# Patient Record
Sex: FEMALE | Race: WHITE | NOT HISPANIC OR LATINO | Employment: FULL TIME | URBAN - METROPOLITAN AREA
[De-identification: names, ages, dates, MRNs, and addresses within clinical notes are randomized per-mention and may not be internally consistent; named-entity substitution may affect disease eponyms.]

---

## 2023-08-03 ENCOUNTER — OFFICE VISIT (OUTPATIENT)
Dept: PRIMARY CARE | Facility: CLINIC | Age: 38
End: 2023-08-03
Payer: COMMERCIAL

## 2023-08-03 VITALS
BODY MASS INDEX: 23.68 KG/M2 | OXYGEN SATURATION: 97 % | HEART RATE: 117 BPM | DIASTOLIC BLOOD PRESSURE: 100 MMHG | TEMPERATURE: 97.6 F | SYSTOLIC BLOOD PRESSURE: 140 MMHG | WEIGHT: 132.6 LBS

## 2023-08-03 DIAGNOSIS — I10 PRIMARY HYPERTENSION: Primary | ICD-10-CM

## 2023-08-03 DIAGNOSIS — J45.40 MODERATE PERSISTENT ASTHMA WITHOUT COMPLICATION (HHS-HCC): ICD-10-CM

## 2023-08-03 PROCEDURE — 3080F DIAST BP >= 90 MM HG: CPT | Performed by: INTERNAL MEDICINE

## 2023-08-03 PROCEDURE — 1036F TOBACCO NON-USER: CPT | Performed by: INTERNAL MEDICINE

## 2023-08-03 PROCEDURE — 99213 OFFICE O/P EST LOW 20 MIN: CPT | Performed by: INTERNAL MEDICINE

## 2023-08-03 PROCEDURE — 3077F SYST BP >= 140 MM HG: CPT | Performed by: INTERNAL MEDICINE

## 2023-08-03 RX ORDER — AMLODIPINE BESYLATE 5 MG/1
5 TABLET ORAL DAILY
Qty: 30 TABLET | Refills: 5 | Status: SHIPPED | OUTPATIENT
Start: 2023-08-03 | End: 2024-06-07 | Stop reason: SDUPTHER

## 2023-08-03 RX ORDER — IBUPROFEN 200 MG
200 TABLET ORAL EVERY 6 HOURS
COMMUNITY
End: 2024-03-29 | Stop reason: ENTERED-IN-ERROR

## 2023-08-03 RX ORDER — GABAPENTIN 300 MG/1
300 CAPSULE ORAL 2 TIMES DAILY
COMMUNITY
End: 2023-10-19

## 2023-08-03 RX ORDER — UPADACITINIB 15 MG/1
TABLET, EXTENDED RELEASE ORAL
COMMUNITY
Start: 2022-08-10 | End: 2024-01-28

## 2023-08-03 RX ORDER — TACROLIMUS 1 MG/G
OINTMENT TOPICAL
COMMUNITY
Start: 2023-07-27 | End: 2024-03-29 | Stop reason: ENTERED-IN-ERROR

## 2023-08-03 RX ORDER — FLUTICASONE PROPIONATE AND SALMETEROL 100; 50 UG/1; UG/1
1 POWDER RESPIRATORY (INHALATION)
Qty: 60 EACH | Refills: 1 | Status: SHIPPED | OUTPATIENT
Start: 2023-08-03 | End: 2024-01-29 | Stop reason: SDUPTHER

## 2023-08-03 NOTE — PATIENT INSTRUCTIONS
Take BP medication regularly  Low salt diet, low fat diet  Check BP regularly and keep record of it.

## 2023-08-03 NOTE — PROGRESS NOTES
Subjective   Patient ID: [id number]   [facility]    Carie Mckinnon is a 37 y.o. female who presents for ER Follow-up.She went for Asthma and her BP was elevated. She use albuterol as needed for shortness of breath, she does not use advair for a long time.         Objective   Visit Vitals  BP (!) 140/100 (BP Location: Left arm, Patient Position: Sitting, BP Cuff Size: Adult)   Pulse (!) 117   Temp 36.4 °C (97.6 °F) (Skin)      Review of Systems  All 12 systems reviewed, no other abnormality except that mentioned in HPI.    Physical Exam  Constitutional- no abnormality  ENT- no abnormality  Neck- no swelling  CVS- normal S1 and S2, no murmur.  Pulmonary- clear to auscultation,no rhonchi, no wheezes.  Abdomen- normal- liver and spleen, soft, no distension, bowel sound present.  Neurological- all cranial nerves intact, speech and gait normal, no sensory or motor deficiency.  Musculoskeletal- all pulses are normal, normal movement, no joint swelling.  Skin- no rash, dry.  psychiatry- no suicidal ideation.  Lymph node- no lyphadenopathy      Assessment/Plan   Problem List Items Addressed This Visit       Primary hypertension - Primary    Relevant Medications    amLODIPine (Norvasc) 5 mg tablet    Moderate persistent asthma without complication    Relevant Medications    fluticasone propion-salmeteroL (Advair Diskus) 100-50 mcg/dose diskus inhaler   HTN- amlodipine 5 mg ordered, monitor BP and low salt diet.  Asthma- advair ordered as patient requested.    Adan aGrcia MD

## 2023-09-29 LAB
CHLAMYDIA TRACH., AMPLIFIED: NEGATIVE
N. GONORRHEA, AMPLIFIED: NEGATIVE
TRICHOMONAS VAGINALIS: NEGATIVE

## 2023-10-10 ENCOUNTER — TELEPHONE (OUTPATIENT)
Dept: OBSTETRICS AND GYNECOLOGY | Facility: CLINIC | Age: 38
End: 2023-10-10
Payer: COMMERCIAL

## 2023-10-10 NOTE — TELEPHONE ENCOUNTER
Patient was calling you back for her test results. States that she works 7-5 and is not able to answer the phone. She would like a call back tomorrow when you are back in the office and asks that you leave a detailed message on her voicemail with her consent as she will be at work.

## 2023-10-11 PROBLEM — L23.2 ALLERGIC CONTACT DERMATITIS DUE TO COSMETICS: Status: ACTIVE | Noted: 2023-02-01

## 2023-10-11 PROBLEM — N91.2 AMENORRHEA: Status: ACTIVE | Noted: 2019-11-26

## 2023-10-11 PROBLEM — R68.89 GENERAL SYMPTOM: Status: ACTIVE | Noted: 2023-10-11

## 2023-10-11 PROBLEM — L30.9 DERMATITIS, UNSPECIFIED: Status: ACTIVE | Noted: 2023-02-01

## 2023-10-11 PROBLEM — J30.9 ALLERGIC RHINITIS: Status: ACTIVE | Noted: 2023-10-11

## 2023-10-11 PROBLEM — H04.129 DRY EYE SYNDROME: Status: ACTIVE | Noted: 2023-10-11

## 2023-10-11 PROBLEM — H02.20C: Status: RESOLVED | Noted: 2023-10-11 | Resolved: 2023-10-11

## 2023-10-11 PROBLEM — R93.1 ABNORMAL ECHOCARDIOGRAM: Status: ACTIVE | Noted: 2023-10-11

## 2023-10-11 PROBLEM — F43.23 ADJUSTMENT DISORDER WITH MIXED ANXIETY AND DEPRESSED MOOD: Status: ACTIVE | Noted: 2023-10-11

## 2023-10-11 PROBLEM — H69.90 EUSTACHIAN TUBE DYSFUNCTION: Status: ACTIVE | Noted: 2023-10-11

## 2023-10-11 PROBLEM — E86.0 DEHYDRATION: Status: ACTIVE | Noted: 2018-06-21

## 2023-10-11 PROBLEM — T78.2XXA ANAPHYLAXIS: Status: ACTIVE | Noted: 2023-10-11

## 2023-10-11 PROBLEM — L20.84 INTRINSIC ALLERGIC ECZEMA: Status: ACTIVE | Noted: 2023-02-01

## 2023-10-11 PROBLEM — H02.115 CICATRICIAL ECTROPION OF LEFT LOWER EYELID: Status: ACTIVE | Noted: 2023-10-11

## 2023-10-11 PROBLEM — E87.20 LACTIC ACIDOSIS: Status: ACTIVE | Noted: 2018-06-21

## 2023-10-11 PROBLEM — F41.9 ANXIETY: Status: ACTIVE | Noted: 2023-10-11

## 2023-10-11 PROBLEM — H02.112 CICATRICIAL ECTROPION OF RIGHT LOWER EYELID: Status: ACTIVE | Noted: 2023-10-11

## 2023-10-11 PROBLEM — F10.920 ALCOHOLIC INTOXICATION WITHOUT COMPLICATION (CMS-HCC): Status: ACTIVE | Noted: 2023-01-27

## 2023-10-11 PROBLEM — G40.909 SEIZURE DISORDER (MULTI): Status: ACTIVE | Noted: 2023-10-11

## 2023-10-11 PROBLEM — H60.90 OTITIS EXTERNA: Status: ACTIVE | Noted: 2023-10-11

## 2023-10-11 PROBLEM — L23.9 ALLERGIC CONTACT DERMATITIS, UNSPECIFIED CAUSE: Status: ACTIVE | Noted: 2023-02-01

## 2023-10-11 PROBLEM — G47.00 INSOMNIA: Status: ACTIVE | Noted: 2018-02-21

## 2023-10-11 PROBLEM — R60.9 EDEMA: Status: ACTIVE | Noted: 2023-10-11

## 2023-10-11 PROBLEM — L20.9 ATOPIC DERMATITIS, UNSPECIFIED: Status: ACTIVE | Noted: 2023-02-01

## 2023-10-11 PROBLEM — R56.9 SEIZURE-LIKE ACTIVITY (MULTI): Status: ACTIVE | Noted: 2020-08-17

## 2023-10-11 PROBLEM — D75.89 MACROCYTOSIS: Status: ACTIVE | Noted: 2023-10-11

## 2023-10-11 PROBLEM — J45.909 ASTHMA (HHS-HCC): Status: ACTIVE | Noted: 2023-10-11

## 2023-10-11 RX ORDER — MELOXICAM 15 MG/1
15 TABLET ORAL DAILY
COMMUNITY
Start: 2023-03-06 | End: 2024-03-29 | Stop reason: ENTERED-IN-ERROR

## 2023-10-11 RX ORDER — ERGOCALCIFEROL 1.25 MG/1
50000 CAPSULE ORAL 3 TIMES WEEKLY
COMMUNITY
Start: 2019-12-01 | End: 2024-03-29 | Stop reason: ENTERED-IN-ERROR

## 2023-10-11 RX ORDER — CHOLECALCIFEROL (VITAMIN D3) 25 MCG
1000 TABLET ORAL
COMMUNITY
End: 2024-03-29 | Stop reason: ENTERED-IN-ERROR

## 2023-10-11 RX ORDER — ALPRAZOLAM 1 MG/1
1 TABLET ORAL NIGHTLY PRN
COMMUNITY
Start: 2020-04-15 | End: 2024-03-29 | Stop reason: ENTERED-IN-ERROR

## 2023-10-11 RX ORDER — LACOSAMIDE 100 MG/1
100 TABLET ORAL 2 TIMES DAILY
COMMUNITY
Start: 2018-01-23 | End: 2024-03-29 | Stop reason: ENTERED-IN-ERROR

## 2023-10-11 RX ORDER — ALPRAZOLAM 0.5 MG/1
1-2 TABLET ORAL AS NEEDED
COMMUNITY
Start: 2017-12-08 | End: 2024-03-29 | Stop reason: ENTERED-IN-ERROR

## 2023-10-11 RX ORDER — AZELASTINE 1 MG/ML
2 SPRAY, METERED NASAL 2 TIMES DAILY PRN
COMMUNITY
Start: 2022-05-18 | End: 2024-03-29 | Stop reason: ENTERED-IN-ERROR

## 2023-10-11 RX ORDER — MOLNUPIRAVIR 200 MG/1
CAPSULE ORAL
COMMUNITY
Start: 2022-11-29 | End: 2024-03-29 | Stop reason: ENTERED-IN-ERROR

## 2023-10-11 RX ORDER — ONDANSETRON 4 MG/1
4 TABLET, ORALLY DISINTEGRATING ORAL EVERY 4 HOURS PRN
COMMUNITY
Start: 2023-08-01 | End: 2024-03-29 | Stop reason: ENTERED-IN-ERROR

## 2023-10-11 RX ORDER — ACETAMINOPHEN AND CODEINE PHOSPHATE 300; 30 MG/1; MG/1
1 TABLET ORAL EVERY 6 HOURS PRN
COMMUNITY
Start: 2023-09-18 | End: 2024-03-29 | Stop reason: ENTERED-IN-ERROR

## 2023-10-11 RX ORDER — FLUTICASONE PROPIONATE 50 MCG
1 SPRAY, SUSPENSION (ML) NASAL
COMMUNITY
Start: 2023-02-18 | End: 2024-02-12 | Stop reason: SDUPTHER

## 2023-10-11 RX ORDER — FLUCONAZOLE 100 MG/1
100 TABLET ORAL
COMMUNITY
Start: 2022-02-25 | End: 2024-03-29 | Stop reason: ENTERED-IN-ERROR

## 2023-10-11 RX ORDER — ACETAMINOPHEN 500 MG
1000 TABLET ORAL EVERY 6 HOURS PRN
COMMUNITY
End: 2024-03-29 | Stop reason: ENTERED-IN-ERROR

## 2023-10-11 RX ORDER — HYDROXYZINE HYDROCHLORIDE 25 MG/1
25 TABLET, FILM COATED ORAL 3 TIMES DAILY PRN
COMMUNITY
Start: 2015-07-27 | End: 2024-03-29 | Stop reason: ENTERED-IN-ERROR

## 2023-10-11 RX ORDER — EPINEPHRINE 0.22MG
100 AEROSOL WITH ADAPTER (ML) INHALATION
COMMUNITY
End: 2024-03-29 | Stop reason: ENTERED-IN-ERROR

## 2023-10-11 RX ORDER — DIPHENHYDRAMINE HCL 25 MG
25 CAPSULE ORAL EVERY 6 HOURS PRN
COMMUNITY
End: 2024-03-29 | Stop reason: ENTERED-IN-ERROR

## 2023-10-11 RX ORDER — MONTELUKAST SODIUM 10 MG/1
10 TABLET ORAL DAILY
COMMUNITY
End: 2024-03-29 | Stop reason: ENTERED-IN-ERROR

## 2023-10-11 RX ORDER — ALBUTEROL SULFATE 90 UG/1
1 AEROSOL, METERED RESPIRATORY (INHALATION) EVERY 6 HOURS PRN
COMMUNITY
Start: 2020-03-27

## 2023-10-11 RX ORDER — OLOPATADINE HYDROCHLORIDE 2 MG/ML
1 SOLUTION/ DROPS OPHTHALMIC DAILY
COMMUNITY
Start: 2022-05-18 | End: 2024-03-29 | Stop reason: ENTERED-IN-ERROR

## 2023-10-11 RX ORDER — LEVONORGESTREL 52 MG/1
INTRAUTERINE DEVICE INTRAUTERINE
COMMUNITY
Start: 2023-09-28 | End: 2024-03-29 | Stop reason: ENTERED-IN-ERROR

## 2023-10-11 RX ORDER — ALBUTEROL SULFATE 0.83 MG/ML
SOLUTION RESPIRATORY (INHALATION)
COMMUNITY
Start: 2022-05-05 | End: 2024-03-29 | Stop reason: ENTERED-IN-ERROR

## 2023-10-11 RX ORDER — IRON POLYSACCHARIDE COMPLEX 150 MG
150 CAPSULE ORAL
COMMUNITY
Start: 2019-12-01 | End: 2024-03-29 | Stop reason: ENTERED-IN-ERROR

## 2023-10-13 ENCOUNTER — APPOINTMENT (OUTPATIENT)
Dept: DERMATOLOGY | Facility: CLINIC | Age: 38
End: 2023-10-13
Payer: COMMERCIAL

## 2023-10-16 DIAGNOSIS — M21.172 VARUS DEFORMITY, NOT ELSEWHERE CLASSIFIED, LEFT ANKLE: ICD-10-CM

## 2023-10-19 RX ORDER — GABAPENTIN 300 MG/1
300 CAPSULE ORAL 2 TIMES DAILY
Qty: 60 CAPSULE | Refills: 6 | Status: SHIPPED | OUTPATIENT
Start: 2023-10-19 | End: 2024-06-07 | Stop reason: SDUPTHER

## 2023-11-03 PROBLEM — Z30.431 CONTRACEPTIVE, SURVEILLANCE, INTRAUTERINE DEVICE: Status: ACTIVE | Noted: 2023-11-03

## 2023-11-03 NOTE — PROGRESS NOTES
SUBJECTIVE  Carie Mckinnon is a 37 y.o. female here for 4 week IUD string check    No obstetric history on file.    Gyn:   Menstrual Pattern:   STIs: denies  Sexual Activity: men, monogamous, no complaints  Contraception: IUD    [unfilled]  Past Medical History:   Diagnosis Date    Personal history of other diseases of the circulatory system     History of SBE (subacute bacterial endocarditis)    Unspecified cataract     Cataracts, both eyes      Past Surgical History:   Procedure Laterality Date    OTHER SURGICAL HISTORY  07/21/2022    No history of surgery        OBJECTIVE  There were no vitals taken for this visit.     General:   Alert and oriented, in no acute distress   Neck: Supple. No visible thyromegaly.    Abdomen: Soft, non-tender, without masses or organomegaly   Vulva: Normal architecture without erythema, masses, or lesions.    Vagina: Normal mucosa without lesions, masses, or atrophy. No abnormal vaginal discharge.    Cervix: Normal without masses, lesions, or signs of cervicitis. IUD string visible   Pelvic Floor No POP noted. No high tone pelvic floor    Psych Normal affect. Normal mood.      Problem List Items Addressed This Visit       Contraceptive, surveillance, intrauterine device - Primary      IMPRESSIONS:  IUD in place , string visible        Erinn Vargas MD.TBGYN

## 2023-11-06 ENCOUNTER — OFFICE VISIT (OUTPATIENT)
Dept: OBSTETRICS AND GYNECOLOGY | Facility: CLINIC | Age: 38
End: 2023-11-06
Payer: COMMERCIAL

## 2023-11-06 VITALS — DIASTOLIC BLOOD PRESSURE: 112 MMHG | BODY MASS INDEX: 25.53 KG/M2 | SYSTOLIC BLOOD PRESSURE: 160 MMHG | WEIGHT: 143 LBS

## 2023-11-06 DIAGNOSIS — Z30.431 CONTRACEPTIVE, SURVEILLANCE, INTRAUTERINE DEVICE: Primary | ICD-10-CM

## 2023-11-06 PROCEDURE — 3077F SYST BP >= 140 MM HG: CPT | Performed by: OBSTETRICS & GYNECOLOGY

## 2023-11-06 PROCEDURE — 99213 OFFICE O/P EST LOW 20 MIN: CPT | Performed by: OBSTETRICS & GYNECOLOGY

## 2023-11-06 PROCEDURE — 3080F DIAST BP >= 90 MM HG: CPT | Performed by: OBSTETRICS & GYNECOLOGY

## 2023-11-06 PROCEDURE — 1036F TOBACCO NON-USER: CPT | Performed by: OBSTETRICS & GYNECOLOGY

## 2024-01-25 DIAGNOSIS — L20.89 OTHER ATOPIC DERMATITIS: Primary | ICD-10-CM

## 2024-01-28 RX ORDER — UPADACITINIB 15 MG/1
15 TABLET, EXTENDED RELEASE ORAL DAILY
Qty: 30 TABLET | Refills: 11 | Status: SHIPPED | OUTPATIENT
Start: 2024-01-28

## 2024-01-29 ENCOUNTER — TELEPHONE (OUTPATIENT)
Dept: PRIMARY CARE | Facility: CLINIC | Age: 39
End: 2024-01-29
Payer: COMMERCIAL

## 2024-01-29 DIAGNOSIS — J45.40 MODERATE PERSISTENT ASTHMA WITHOUT COMPLICATION (HHS-HCC): ICD-10-CM

## 2024-01-29 DIAGNOSIS — J30.89 ALLERGIC RHINITIS DUE TO OTHER ALLERGIC TRIGGER, UNSPECIFIED SEASONALITY: ICD-10-CM

## 2024-01-29 NOTE — TELEPHONE ENCOUNTER
Patient called, LVM, requesting refill of Advair 100/40 be sent to [facility] in [place].  Said that this was originally filled by Dr. Garcia, who she saw in Aug due to no availability to see you.      She recently had foot surgery, is hard to get in and out of the house and would be difficult to come in for appt, but if that is what's needed, she will make it happen.     LOV 8/3/23

## 2024-02-12 ENCOUNTER — TELEPHONE (OUTPATIENT)
Dept: PRIMARY CARE | Facility: CLINIC | Age: 39
End: 2024-02-12
Payer: COMMERCIAL

## 2024-02-13 NOTE — TELEPHONE ENCOUNTER
Patient called, left msg, regarding request for refill of Advair that she called about on 1/29/24.  She said she recently had foot surgery and would be difficult to come in for appt right now.      Please approve refill.

## 2024-02-14 RX ORDER — FLUTICASONE PROPIONATE AND SALMETEROL 100; 50 UG/1; UG/1
1 POWDER RESPIRATORY (INHALATION)
Qty: 60 EACH | Refills: 1 | Status: SHIPPED | OUTPATIENT
Start: 2024-02-14 | End: 2025-02-13

## 2024-02-14 RX ORDER — FLUTICASONE PROPIONATE 50 MCG
1 SPRAY, SUSPENSION (ML) NASAL
Qty: 16 G | Refills: 0 | Status: SHIPPED | OUTPATIENT
Start: 2024-02-14

## 2024-03-26 ENCOUNTER — TELEMEDICINE (OUTPATIENT)
Dept: DERMATOLOGY | Facility: CLINIC | Age: 39
End: 2024-03-26
Payer: COMMERCIAL

## 2024-03-26 DIAGNOSIS — L20.9 ATOPIC DERMATITIS, UNSPECIFIED TYPE: Primary | ICD-10-CM

## 2024-03-26 PROCEDURE — 99214 OFFICE O/P EST MOD 30 MIN: CPT | Performed by: DERMATOLOGY

## 2024-03-26 PROCEDURE — 1036F TOBACCO NON-USER: CPT | Performed by: DERMATOLOGY

## 2024-03-26 ASSESSMENT — DERMATOLOGY QUALITY OF LIFE (QOL) ASSESSMENT
RATE HOW EMOTIONALLY BOTHERED YOU ARE BY YOUR SKIN PROBLEM (FOR EXAMPLE, WORRY, EMBARRASSMENT, FRUSTRATION): 4
RATE HOW BOTHERED YOU ARE BY EFFECTS OF YOUR SKIN PROBLEMS ON YOUR ACTIVITIES (EG, GOING OUT, ACCOMPLISHING WHAT YOU WANT, WORK ACTIVITIES OR YOUR RELATIONSHIPS WITH OTHERS): 0 - NEVER BOTHERED
RATE HOW EMOTIONALLY BOTHERED YOU ARE BY YOUR SKIN PROBLEM (FOR EXAMPLE, WORRY, EMBARRASSMENT, FRUSTRATION): 4
RATE HOW BOTHERED YOU ARE BY SYMPTOMS OF YOUR SKIN PROBLEM (EG, ITCHING, STINGING BURNING, HURTING OR SKIN IRRITATION): 2
WHAT SINGLE SKIN CONDITION LISTED BELOW IS THE PATIENT ANSWERING THE QUALITY-OF-LIFE ASSESSMENT QUESTIONS ABOUT: DERMATITIS
RATE HOW BOTHERED YOU ARE BY EFFECTS OF YOUR SKIN PROBLEMS ON YOUR ACTIVITIES (EG, GOING OUT, ACCOMPLISHING WHAT YOU WANT, WORK ACTIVITIES OR YOUR RELATIONSHIPS WITH OTHERS): 0 - NEVER BOTHERED
WHAT SINGLE SKIN CONDITION LISTED BELOW IS THE PATIENT ANSWERING THE QUALITY-OF-LIFE ASSESSMENT QUESTIONS ABOUT: DERMATITIS
RATE HOW BOTHERED YOU ARE BY SYMPTOMS OF YOUR SKIN PROBLEM (EG, ITCHING, STINGING BURNING, HURTING OR SKIN IRRITATION): 2

## 2024-03-26 ASSESSMENT — PATIENT GLOBAL ASSESSMENT (PGA): WHAT IS THE PGA: PATIENT GLOBAL ASSESSMENT:  2 - MILD

## 2024-03-26 NOTE — PROGRESS NOTES
Subjective     Sherin Mckinnon is a 38 y.o. female who presents for the follow-up for atopic dermatitis.     Patient is being seen for atopic dermatitis and previously was seen by Dr. Kat. Patient had trialed Dupixent but did not have adequate control and was transitioned to Rinvoq in [id number]. She had a great response and continues to use tacrolimus 0.1% ointment as needed. Due to changes in insurance, she was not able to continue treatment with Rinvoq due to cost as of January 2024.    Patient notes her eczematous plaques on primarily her forehead. Bilateral antecubital fossa and neck but it is mild. She notes she continues to use triamcinolone ointment as needed but has had very good success on Rinvoq when she was taking it.     Denies any other issues.     Review of Systems:  No other skin or systemic complaints other than what is documented elsewhere in the note.    The following portions of the chart were reviewed this encounter and updated as appropriate:         Skin Cancer History  No skin cancer on file.      Specialty Problems          Dermatology Problems    Erythroderma     Last Assessment & Plan: Formatting of this note might be different from the original. atarax         Eczema     Formatting of this note might be different from the original. was diagnosed with eczema at the age of 4 and was treated with steroids since then. Recent hx goes back to January 2015 when pt got admitted to [facility] for worsening generalized eczema along with skin sloughing and concern for SJS. Diagnosis was worsening eczema and pt was treated with pulse steroids and dsicharged on topical steroids. She then saw Rheumatology at an OSH who started her back on po steroids. Discussion was held on to whether pt needs to be started on immunosuppressants but was never done give increased diastolic pressure?? Of note, pt also tried phototherapy without improvement. She has been off steroids since August  Plan: - Consult Dermatology - Pt refusing topical steroids - Resume home hydroxizine and gabapentin for pain/itching         Allergic contact dermatitis, unspecified cause    Atopic dermatitis, unspecified    Dermatitis, unspecified    Intrinsic allergic eczema        Objective   Well appearing patient in no apparent distress; mood and affect are within normal limits.    A full examination was performed including scalp, head, eyes, ears, nose, lips, neck, chest, axillae, abdomen, back, buttocks, bilateral upper extremities, bilateral lower extremities, hands, feet, fingers, toes, fingernails, and toenails. All findings within normal limits unless otherwise noted below.    Assessment/Plan   1. Atopic dermatitis, unspecified type  Face, antecubital fossa, neck  Very mild erythematous scaly papules and plaques with overyling excoriation located symmetrically     Atopic dermatitis  - Discussed disease etiology with regards to skin barrier breakdown  - Previously on topical steroids and Dupixent with sub optimal control  - Had excellent response to Rinvoq when started in [id number]. Due to cost/insurance, has not been able to obtain Rinvoq since January 2024  - Continues to use tacrolimus 0.1% ointment as needed. Adamant she does not wish to use any topical steroids.   - CONTINUE with Rinvoq 15 mg ER tablet once daily. Will work to get approval through insurance. Discussed alternatives to Rinvoq such as topical DAO inhibitors (abrocitinib, ruxolitinib)  - CONTINUE with tacrolimus 0.1% ointment as needed        Buddy Egan MD  PGY3 Dermatology

## 2024-03-29 ENCOUNTER — APPOINTMENT (OUTPATIENT)
Dept: RADIOLOGY | Facility: HOSPITAL | Age: 39
End: 2024-03-29
Payer: COMMERCIAL

## 2024-03-29 ENCOUNTER — HOSPITAL ENCOUNTER (OUTPATIENT)
Facility: HOSPITAL | Age: 39
Setting detail: OBSERVATION
Discharge: ANOTHER HOSPITAL | End: 2024-03-30
Attending: STUDENT IN AN ORGANIZED HEALTH CARE EDUCATION/TRAINING PROGRAM | Admitting: INTERNAL MEDICINE
Payer: COMMERCIAL

## 2024-03-29 ENCOUNTER — APPOINTMENT (OUTPATIENT)
Dept: CARDIOLOGY | Facility: HOSPITAL | Age: 39
End: 2024-03-29
Payer: COMMERCIAL

## 2024-03-29 ENCOUNTER — APPOINTMENT (OUTPATIENT)
Dept: DERMATOLOGY | Facility: CLINIC | Age: 39
End: 2024-03-29
Payer: COMMERCIAL

## 2024-03-29 DIAGNOSIS — R41.82 ALTERED MENTAL STATUS, UNSPECIFIED ALTERED MENTAL STATUS TYPE: Primary | ICD-10-CM

## 2024-03-29 DIAGNOSIS — G40.909 SEIZURE DISORDER (MULTI): ICD-10-CM

## 2024-03-29 PROBLEM — G45.9 TIA (TRANSIENT ISCHEMIC ATTACK): Status: ACTIVE | Noted: 2024-03-29

## 2024-03-29 LAB
ALBUMIN SERPL BCP-MCNC: 4.7 G/DL (ref 3.4–5)
ALP SERPL-CCNC: 70 U/L (ref 33–110)
ALT SERPL W P-5'-P-CCNC: 17 U/L (ref 7–45)
AMPHETAMINES UR QL SCN: ABNORMAL
ANION GAP BLDV CALCULATED.4IONS-SCNC: 16 MMOL/L (ref 10–25)
ANION GAP SERPL CALC-SCNC: 19 MMOL/L (ref 10–20)
AST SERPL W P-5'-P-CCNC: 76 U/L (ref 9–39)
B-HCG SERPL-ACNC: <2 MIU/ML
BARBITURATES UR QL SCN: ABNORMAL
BASE EXCESS BLDV CALC-SCNC: 1.4 MMOL/L (ref -2–3)
BASOPHILS # BLD AUTO: 0.01 X10*3/UL (ref 0–0.1)
BASOPHILS NFR BLD AUTO: 0.1 %
BENZODIAZ UR QL SCN: ABNORMAL
BILIRUB DIRECT SERPL-MCNC: 0 MG/DL (ref 0–0.3)
BILIRUB SERPL-MCNC: 0.3 MG/DL (ref 0–1.2)
BNP SERPL-MCNC: 168 PG/ML (ref 0–99)
BODY TEMPERATURE: 37 DEGREES CELSIUS
BUN SERPL-MCNC: 27 MG/DL (ref 6–23)
BZE UR QL SCN: ABNORMAL
CA-I BLDV-SCNC: 1.12 MMOL/L (ref 1.1–1.33)
CALCIUM SERPL-MCNC: 10 MG/DL (ref 8.6–10.3)
CANNABINOIDS UR QL SCN: ABNORMAL
CARDIAC TROPONIN I PNL SERPL HS: 9 NG/L (ref 0–13)
CHLORIDE BLDV-SCNC: 98 MMOL/L (ref 98–107)
CHLORIDE SERPL-SCNC: 95 MMOL/L (ref 98–107)
CO2 SERPL-SCNC: 24 MMOL/L (ref 21–32)
CREAT SERPL-MCNC: 0.62 MG/DL (ref 0.5–1.05)
EGFRCR SERPLBLD CKD-EPI 2021: >90 ML/MIN/1.73M*2
EOSINOPHIL # BLD AUTO: 0 X10*3/UL (ref 0–0.7)
EOSINOPHIL NFR BLD AUTO: 0 %
ERYTHROCYTE [DISTWIDTH] IN BLOOD BY AUTOMATED COUNT: 13.1 % (ref 11.5–14.5)
FENTANYL+NORFENTANYL UR QL SCN: ABNORMAL
FLUAV RNA RESP QL NAA+PROBE: NOT DETECTED
FLUBV RNA RESP QL NAA+PROBE: NOT DETECTED
GLUCOSE BLDV-MCNC: 108 MG/DL (ref 74–99)
GLUCOSE SERPL-MCNC: 111 MG/DL (ref 74–99)
HCO3 BLDV-SCNC: 24.7 MMOL/L (ref 22–26)
HCT VFR BLD AUTO: 39.4 % (ref 36–46)
HCT VFR BLD EST: 39 % (ref 36–46)
HGB BLD-MCNC: 13.1 G/DL (ref 12–16)
HGB BLDV-MCNC: 13 G/DL (ref 12–16)
IMM GRANULOCYTES # BLD AUTO: 0.05 X10*3/UL (ref 0–0.7)
IMM GRANULOCYTES NFR BLD AUTO: 0.4 % (ref 0–0.9)
INHALED O2 CONCENTRATION: 21 %
LACTATE BLDV-SCNC: 1.3 MMOL/L (ref 0.4–2)
LACTATE SERPL-SCNC: 0.7 MMOL/L (ref 0.4–2)
LIPASE SERPL-CCNC: 11 U/L (ref 9–82)
LYMPHOCYTES # BLD AUTO: 0.72 X10*3/UL (ref 1.2–4.8)
LYMPHOCYTES NFR BLD AUTO: 6.5 %
MAGNESIUM SERPL-MCNC: 2.1 MG/DL (ref 1.6–2.4)
MCH RBC QN AUTO: 32.3 PG (ref 26–34)
MCHC RBC AUTO-ENTMCNC: 33.2 G/DL (ref 32–36)
MCV RBC AUTO: 97 FL (ref 80–100)
METHADONE UR QL SCN: ABNORMAL
MONOCYTES # BLD AUTO: 0.98 X10*3/UL (ref 0.1–1)
MONOCYTES NFR BLD AUTO: 8.8 %
NEUTROPHILS # BLD AUTO: 9.37 X10*3/UL (ref 1.2–7.7)
NEUTROPHILS NFR BLD AUTO: 84.2 %
NRBC BLD-RTO: 0 /100 WBCS (ref 0–0)
OPIATES UR QL SCN: ABNORMAL
OXYCODONE+OXYMORPHONE UR QL SCN: ABNORMAL
OXYHGB MFR BLDV: 90.9 % (ref 45–75)
PCO2 BLDV: 34 MM HG (ref 41–51)
PCP UR QL SCN: ABNORMAL
PH BLDV: 7.47 PH (ref 7.33–7.43)
PHOSPHATE SERPL-MCNC: 2.2 MG/DL (ref 2.5–4.9)
PLATELET # BLD AUTO: 513 X10*3/UL (ref 150–450)
PO2 BLDV: 65 MM HG (ref 35–45)
POTASSIUM BLDV-SCNC: 3.2 MMOL/L (ref 3.5–5.3)
POTASSIUM SERPL-SCNC: 3.1 MMOL/L (ref 3.5–5.3)
PROT SERPL-MCNC: 8.4 G/DL (ref 6.4–8.2)
RBC # BLD AUTO: 4.05 X10*6/UL (ref 4–5.2)
RSV RNA RESP QL NAA+PROBE: NOT DETECTED
SAO2 % BLDV: 93 % (ref 45–75)
SARS-COV-2 RNA RESP QL NAA+PROBE: NOT DETECTED
SODIUM BLDV-SCNC: 135 MMOL/L (ref 136–145)
SODIUM SERPL-SCNC: 135 MMOL/L (ref 136–145)
WBC # BLD AUTO: 11.1 X10*3/UL (ref 4.4–11.3)

## 2024-03-29 PROCEDURE — 74177 CT ABD & PELVIS W/CONTRAST: CPT

## 2024-03-29 PROCEDURE — 71045 X-RAY EXAM CHEST 1 VIEW: CPT | Performed by: RADIOLOGY

## 2024-03-29 PROCEDURE — 83605 ASSAY OF LACTIC ACID: CPT | Performed by: STUDENT IN AN ORGANIZED HEALTH CARE EDUCATION/TRAINING PROGRAM

## 2024-03-29 PROCEDURE — 84132 ASSAY OF SERUM POTASSIUM: CPT | Performed by: STUDENT IN AN ORGANIZED HEALTH CARE EDUCATION/TRAINING PROGRAM

## 2024-03-29 PROCEDURE — 74177 CT ABD & PELVIS W/CONTRAST: CPT | Performed by: RADIOLOGY

## 2024-03-29 PROCEDURE — 2550000001 HC RX 255 CONTRASTS: Performed by: STUDENT IN AN ORGANIZED HEALTH CARE EDUCATION/TRAINING PROGRAM

## 2024-03-29 PROCEDURE — 84484 ASSAY OF TROPONIN QUANT: CPT | Performed by: STUDENT IN AN ORGANIZED HEALTH CARE EDUCATION/TRAINING PROGRAM

## 2024-03-29 PROCEDURE — 84075 ASSAY ALKALINE PHOSPHATASE: CPT | Performed by: STUDENT IN AN ORGANIZED HEALTH CARE EDUCATION/TRAINING PROGRAM

## 2024-03-29 PROCEDURE — 70450 CT HEAD/BRAIN W/O DYE: CPT

## 2024-03-29 PROCEDURE — 93005 ELECTROCARDIOGRAM TRACING: CPT

## 2024-03-29 PROCEDURE — G0378 HOSPITAL OBSERVATION PER HR: HCPCS

## 2024-03-29 PROCEDURE — 71045 X-RAY EXAM CHEST 1 VIEW: CPT

## 2024-03-29 PROCEDURE — 84702 CHORIONIC GONADOTROPIN TEST: CPT | Performed by: STUDENT IN AN ORGANIZED HEALTH CARE EDUCATION/TRAINING PROGRAM

## 2024-03-29 PROCEDURE — 99285 EMERGENCY DEPT VISIT HI MDM: CPT | Mod: 25

## 2024-03-29 PROCEDURE — 85025 COMPLETE CBC W/AUTO DIFF WBC: CPT | Performed by: STUDENT IN AN ORGANIZED HEALTH CARE EDUCATION/TRAINING PROGRAM

## 2024-03-29 PROCEDURE — 96361 HYDRATE IV INFUSION ADD-ON: CPT

## 2024-03-29 PROCEDURE — 80307 DRUG TEST PRSMV CHEM ANLYZR: CPT | Performed by: STUDENT IN AN ORGANIZED HEALTH CARE EDUCATION/TRAINING PROGRAM

## 2024-03-29 PROCEDURE — 87637 SARSCOV2&INF A&B&RSV AMP PRB: CPT | Performed by: STUDENT IN AN ORGANIZED HEALTH CARE EDUCATION/TRAINING PROGRAM

## 2024-03-29 PROCEDURE — 83735 ASSAY OF MAGNESIUM: CPT | Performed by: STUDENT IN AN ORGANIZED HEALTH CARE EDUCATION/TRAINING PROGRAM

## 2024-03-29 PROCEDURE — 2500000001 HC RX 250 WO HCPCS SELF ADMINISTERED DRUGS (ALT 637 FOR MEDICARE OP): Performed by: PHYSICIAN ASSISTANT

## 2024-03-29 PROCEDURE — 81001 URINALYSIS AUTO W/SCOPE: CPT | Performed by: STUDENT IN AN ORGANIZED HEALTH CARE EDUCATION/TRAINING PROGRAM

## 2024-03-29 PROCEDURE — 83880 ASSAY OF NATRIURETIC PEPTIDE: CPT | Performed by: STUDENT IN AN ORGANIZED HEALTH CARE EDUCATION/TRAINING PROGRAM

## 2024-03-29 PROCEDURE — 2500000004 HC RX 250 GENERAL PHARMACY W/ HCPCS (ALT 636 FOR OP/ED): Performed by: STUDENT IN AN ORGANIZED HEALTH CARE EDUCATION/TRAINING PROGRAM

## 2024-03-29 PROCEDURE — 84100 ASSAY OF PHOSPHORUS: CPT | Performed by: STUDENT IN AN ORGANIZED HEALTH CARE EDUCATION/TRAINING PROGRAM

## 2024-03-29 PROCEDURE — 36415 COLL VENOUS BLD VENIPUNCTURE: CPT | Performed by: STUDENT IN AN ORGANIZED HEALTH CARE EDUCATION/TRAINING PROGRAM

## 2024-03-29 PROCEDURE — 99222 1ST HOSP IP/OBS MODERATE 55: CPT | Performed by: PHYSICIAN ASSISTANT

## 2024-03-29 PROCEDURE — 70450 CT HEAD/BRAIN W/O DYE: CPT | Performed by: RADIOLOGY

## 2024-03-29 PROCEDURE — 83690 ASSAY OF LIPASE: CPT | Performed by: STUDENT IN AN ORGANIZED HEALTH CARE EDUCATION/TRAINING PROGRAM

## 2024-03-29 RX ORDER — INSULIN LISPRO 100 [IU]/ML
0-5 INJECTION, SOLUTION INTRAVENOUS; SUBCUTANEOUS
Status: DISCONTINUED | OUTPATIENT
Start: 2024-03-30 | End: 2024-03-30 | Stop reason: HOSPADM

## 2024-03-29 RX ORDER — DEXTROSE 50 % IN WATER (D50W) INTRAVENOUS SYRINGE
12.5
Status: DISCONTINUED | OUTPATIENT
Start: 2024-03-29 | End: 2024-03-30 | Stop reason: HOSPADM

## 2024-03-29 RX ORDER — ALBUTEROL SULFATE 0.83 MG/ML
2.5 SOLUTION RESPIRATORY (INHALATION) EVERY 6 HOURS PRN
Status: DISCONTINUED | OUTPATIENT
Start: 2024-03-29 | End: 2024-03-29

## 2024-03-29 RX ORDER — FLUTICASONE PROPIONATE 50 MCG
1 SPRAY, SUSPENSION (ML) NASAL DAILY PRN
Status: DISCONTINUED | OUTPATIENT
Start: 2024-03-29 | End: 2024-03-30 | Stop reason: HOSPADM

## 2024-03-29 RX ORDER — BUDESONIDE 0.5 MG/2ML
0.5 INHALANT ORAL
Status: DISCONTINUED | OUTPATIENT
Start: 2024-03-29 | End: 2024-03-30 | Stop reason: HOSPADM

## 2024-03-29 RX ORDER — GABAPENTIN 300 MG/1
300 CAPSULE ORAL 2 TIMES DAILY
Status: DISCONTINUED | OUTPATIENT
Start: 2024-03-29 | End: 2024-03-30 | Stop reason: HOSPADM

## 2024-03-29 RX ORDER — FORMOTEROL FUMARATE DIHYDRATE 20 UG/2ML
20 SOLUTION RESPIRATORY (INHALATION)
Status: DISCONTINUED | OUTPATIENT
Start: 2024-03-29 | End: 2024-03-30 | Stop reason: HOSPADM

## 2024-03-29 RX ORDER — FLUTICASONE FUROATE AND VILANTEROL 100; 25 UG/1; UG/1
1 POWDER RESPIRATORY (INHALATION)
Status: DISCONTINUED | OUTPATIENT
Start: 2024-03-30 | End: 2024-03-29

## 2024-03-29 RX ORDER — ALBUTEROL SULFATE 0.83 MG/ML
2.5 SOLUTION RESPIRATORY (INHALATION) EVERY 2 HOUR PRN
Status: DISCONTINUED | OUTPATIENT
Start: 2024-03-29 | End: 2024-03-30 | Stop reason: HOSPADM

## 2024-03-29 RX ORDER — ASPIRIN 81 MG/1
81 TABLET ORAL DAILY
Status: DISCONTINUED | OUTPATIENT
Start: 2024-03-29 | End: 2024-03-30 | Stop reason: HOSPADM

## 2024-03-29 RX ORDER — PROCHLORPERAZINE EDISYLATE 5 MG/ML
5 INJECTION INTRAMUSCULAR; INTRAVENOUS EVERY 6 HOURS PRN
Status: DISCONTINUED | OUTPATIENT
Start: 2024-03-29 | End: 2024-03-30 | Stop reason: HOSPADM

## 2024-03-29 RX ORDER — ATORVASTATIN CALCIUM 40 MG/1
40 TABLET, FILM COATED ORAL NIGHTLY
Status: DISCONTINUED | OUTPATIENT
Start: 2024-03-29 | End: 2024-03-30 | Stop reason: HOSPADM

## 2024-03-29 RX ORDER — DEXTROSE 50 % IN WATER (D50W) INTRAVENOUS SYRINGE
25
Status: DISCONTINUED | OUTPATIENT
Start: 2024-03-29 | End: 2024-03-30 | Stop reason: HOSPADM

## 2024-03-29 RX ADMIN — SODIUM CHLORIDE, SODIUM LACTATE, POTASSIUM CHLORIDE, AND CALCIUM CHLORIDE 1000 ML: 600; 310; 30; 20 INJECTION, SOLUTION INTRAVENOUS at 15:15

## 2024-03-29 RX ADMIN — IOHEXOL 75 ML: 350 INJECTION, SOLUTION INTRAVENOUS at 18:47

## 2024-03-29 RX ADMIN — GABAPENTIN 300 MG: 300 CAPSULE ORAL at 21:54

## 2024-03-29 SDOH — SOCIAL STABILITY: SOCIAL INSECURITY: ARE YOU OR HAVE YOU BEEN THREATENED OR ABUSED PHYSICALLY, EMOTIONALLY, OR SEXUALLY BY ANYONE?: NO

## 2024-03-29 SDOH — SOCIAL STABILITY: SOCIAL INSECURITY: HAS ANYONE EVER THREATENED TO HURT YOUR FAMILY OR YOUR PETS?: NO

## 2024-03-29 SDOH — SOCIAL STABILITY: SOCIAL INSECURITY: HAVE YOU HAD THOUGHTS OF HARMING ANYONE ELSE?: NO

## 2024-03-29 SDOH — SOCIAL STABILITY: SOCIAL INSECURITY: ABUSE: ADULT

## 2024-03-29 SDOH — SOCIAL STABILITY: SOCIAL INSECURITY: DOES ANYONE TRY TO KEEP YOU FROM HAVING/CONTACTING OTHER FRIENDS OR DOING THINGS OUTSIDE YOUR HOME?: NO

## 2024-03-29 SDOH — SOCIAL STABILITY: SOCIAL INSECURITY: ARE THERE ANY APPARENT SIGNS OF INJURIES/BEHAVIORS THAT COULD BE RELATED TO ABUSE/NEGLECT?: NO

## 2024-03-29 SDOH — SOCIAL STABILITY: SOCIAL INSECURITY: DO YOU FEEL ANYONE HAS EXPLOITED OR TAKEN ADVANTAGE OF YOU FINANCIALLY OR OF YOUR PERSONAL PROPERTY?: NO

## 2024-03-29 SDOH — SOCIAL STABILITY: SOCIAL INSECURITY: DO YOU FEEL UNSAFE GOING BACK TO THE PLACE WHERE YOU ARE LIVING?: NO

## 2024-03-29 ASSESSMENT — COGNITIVE AND FUNCTIONAL STATUS - GENERAL
DAILY ACTIVITIY SCORE: 24
PATIENT BASELINE BEDBOUND: NO
MOBILITY SCORE: 24

## 2024-03-29 ASSESSMENT — COLUMBIA-SUICIDE SEVERITY RATING SCALE - C-SSRS
1. IN THE PAST MONTH, HAVE YOU WISHED YOU WERE DEAD OR WISHED YOU COULD GO TO SLEEP AND NOT WAKE UP?: NO
6. HAVE YOU EVER DONE ANYTHING, STARTED TO DO ANYTHING, OR PREPARED TO DO ANYTHING TO END YOUR LIFE?: NO
2. HAVE YOU ACTUALLY HAD ANY THOUGHTS OF KILLING YOURSELF?: NO

## 2024-03-29 ASSESSMENT — PATIENT HEALTH QUESTIONNAIRE - PHQ9
SUM OF ALL RESPONSES TO PHQ9 QUESTIONS 1 & 2: 0
2. FEELING DOWN, DEPRESSED OR HOPELESS: NOT AT ALL
1. LITTLE INTEREST OR PLEASURE IN DOING THINGS: NOT AT ALL

## 2024-03-29 ASSESSMENT — LIFESTYLE VARIABLES
SKIP TO QUESTIONS 9-10: 1
AUDIT-C TOTAL SCORE: 1
HOW OFTEN DO YOU HAVE A DRINK CONTAINING ALCOHOL: MONTHLY OR LESS
AUDIT-C TOTAL SCORE: 1
HOW MANY STANDARD DRINKS CONTAINING ALCOHOL DO YOU HAVE ON A TYPICAL DAY: PATIENT DOES NOT DRINK
HOW OFTEN DO YOU HAVE 6 OR MORE DRINKS ON ONE OCCASION: NEVER

## 2024-03-29 ASSESSMENT — ACTIVITIES OF DAILY LIVING (ADL)
GROOMING: INDEPENDENT
PATIENT'S MEMORY ADEQUATE TO SAFELY COMPLETE DAILY ACTIVITIES?: YES
JUDGMENT_ADEQUATE_SAFELY_COMPLETE_DAILY_ACTIVITIES: YES
HEARING - RIGHT EAR: FUNCTIONAL
DRESSING YOURSELF: INDEPENDENT
FEEDING YOURSELF: INDEPENDENT
TOILETING: INDEPENDENT
WALKS IN HOME: INDEPENDENT
BATHING: INDEPENDENT
ADEQUATE_TO_COMPLETE_ADL: YES
HEARING - LEFT EAR: FUNCTIONAL

## 2024-03-29 NOTE — ED PROVIDER NOTES
HPI:    History provided by: Patient and family     38-year-old female past medical history of celiac disease, seizure-like activity, PTSD, anxiety, asthma, history of bacterial endocarditis presented to the emergency department for altered mental status.  Patient states that she has been feeling unwell the last few days.  She did not go to work yesterday because she did not feel well.  Today, she had 5 episodes of nonbloody, nonbilious emesis along with discomfort in her abdomen.  She had a questionable seizure-like activity at home today noticed by family and was confused afterwards, so was brought into the emergency department by EMS.  Patient denies any pain anywhere at this time, just taking that she feels dizzy.  No headache, no weakness or numbness in her arms or legs.  No coughing, fevers, chills.  Denies any diarrhea, burning with urination, or blood in her urine.Family states that something like this has happened in the past where she had difficulty saying words or remembering things after a seizure in [place], but this resolved before she went to the hospital.      ROS: All pertinent positives and negatives reviewed in HPI    PMHx: Reviewed  PSHx: Reviewed  Social: no Etoh, no tobacco, no social drugs  Social Determinants of Health impacting care: NA  Allergies: Reviewed in EMR  FMHx: Noncontributory to patient's chief complaint  Medications: Reviewed        Vital signs and triage note reviewed per nursing documentation    Physical Exam:     GEN: Sitting in no acute distress. Well-appearing, appears stated age  HEAD: Atraumatic, normocephalic  EYES: EOMI. Pupils equal and reactive.   HEENT: MMM. No oropharyngeal erythema, exudates, or uvular deviation.   Neck: Supple, full ROM  CVS: Regular rate and rhythm, no murmurs, gallops, or rubs  PULM: Clear to auscultation bilaterally. No wheezes, rales, rhonchi.   ABD: Soft, nontender to palpation. No rigidity, guarding, or tympany  EXT: +2 radial and DP  pulses bilaterally. No pitting edema noted. No varicose veins  NEURO: Awake with eyes open, following providers and interactive and having conversations.  No drift in upper or lower extremities.  Does take a second to answer questions.  Answers questions appropriately for me.  Knows the year, her name, the month.    Emergency Department Course    Medications Ordered: IV LR    EKG: Regular ventricular rate, regular rhythm, normal axis. MA, QRS, and QTC intervals within normal limits. No acute ST segment changes or T wave inversions.     Impression: Normal sinus rhythm with no acute ischemia or arrhythmia, no previous EKG seen for comparison.       MDM    38-year-old female history of celiac disease, seizure disorder, PTSD, anxiety, eczema who presents to the emergency department for altered mental status from home.  My assessment reveals a hemodynamically stable, fairly well-appearing female in no acute distress.  She is able to have a linear conversation with me, and has no focal neurologic deficits at this time.  As such, not concerned for cerebrovascular accident in this patient with stable neurologic examination and only mild confusion.  In discussion with patient's family, she has had similar episodes occur in the past few years ago with no clear etiology behind this.  Given patient's constellation of symptomatology with recent illness, we will obtain laboratory testing to evaluate for electrolyte derangement, infection, CT head, CT abdomen pelvis.  Patient is mildly hypokalemic with no other severe electrolyte derangements.  BNP mildly elevated, but troponin level negative.  White blood cell count normal, viral swabs negative.  Lactate unremarkable so I am not concerned for true seizure at this time.  Talk screen is negative CT head and CT abdomen diabetes well.  Patient is not quite at her baseline with unclear etiology behind her symptomatology currently.  Therefore, she will be admitted for further  neurologic possible TIA workup.  She had an NIH of 0 on arrival with no focal deficits so therefore I did not activate a stroke alert and she is not a TNK candidate.      Consultations:    NA    Medications Prescribed:    NA    Disposition:    Admitted         Jorge Doty MD  03/29/24 1955

## 2024-03-30 ENCOUNTER — APPOINTMENT (OUTPATIENT)
Dept: NEUROLOGY | Facility: HOSPITAL | Age: 39
End: 2024-03-30
Payer: COMMERCIAL

## 2024-03-30 ENCOUNTER — HOSPITAL ENCOUNTER (OUTPATIENT)
Facility: HOSPITAL | Age: 39
Setting detail: OBSERVATION
LOS: 3 days | Discharge: HOME | End: 2024-04-02
Attending: PSYCHIATRY & NEUROLOGY | Admitting: PSYCHIATRY & NEUROLOGY
Payer: COMMERCIAL

## 2024-03-30 ENCOUNTER — APPOINTMENT (OUTPATIENT)
Dept: RADIOLOGY | Facility: HOSPITAL | Age: 39
End: 2024-03-30
Payer: COMMERCIAL

## 2024-03-30 ENCOUNTER — APPOINTMENT (OUTPATIENT)
Dept: CARDIOLOGY | Facility: HOSPITAL | Age: 39
End: 2024-03-30
Payer: COMMERCIAL

## 2024-03-30 VITALS
HEART RATE: 80 BPM | DIASTOLIC BLOOD PRESSURE: 100 MMHG | RESPIRATION RATE: 18 BRPM | SYSTOLIC BLOOD PRESSURE: 145 MMHG | BODY MASS INDEX: 23.05 KG/M2 | OXYGEN SATURATION: 98 % | WEIGHT: 135 LBS | HEIGHT: 64 IN | TEMPERATURE: 97.7 F

## 2024-03-30 DIAGNOSIS — L30.9 DERMATITIS, UNSPECIFIED: ICD-10-CM

## 2024-03-30 DIAGNOSIS — R41.0 CONFUSION: Primary | ICD-10-CM

## 2024-03-30 DIAGNOSIS — E88.09 HYPOALBUMINEMIA DUE TO PROTEIN-CALORIE MALNUTRITION (MULTI): ICD-10-CM

## 2024-03-30 DIAGNOSIS — E46 HYPOALBUMINEMIA DUE TO PROTEIN-CALORIE MALNUTRITION (MULTI): ICD-10-CM

## 2024-03-30 DIAGNOSIS — R56.9 SEIZURE-LIKE ACTIVITY (MULTI): ICD-10-CM

## 2024-03-30 DIAGNOSIS — R41.82 ALTERED MENTAL STATUS, UNSPECIFIED ALTERED MENTAL STATUS TYPE: ICD-10-CM

## 2024-03-30 DIAGNOSIS — R41.3 MEMORY LOSS: ICD-10-CM

## 2024-03-30 DIAGNOSIS — G40.909 SEIZURE DISORDER (MULTI): ICD-10-CM

## 2024-03-30 LAB
ALBUMIN SERPL BCP-MCNC: 4.3 G/DL (ref 3.4–5)
ALP SERPL-CCNC: 64 U/L (ref 33–110)
ALT SERPL W P-5'-P-CCNC: 19 U/L (ref 7–45)
AMMONIA PLAS-SCNC: 25 UMOL/L (ref 16–53)
ANION GAP SERPL CALC-SCNC: 17 MMOL/L (ref 10–20)
APPEARANCE UR: CLEAR
AST SERPL W P-5'-P-CCNC: 89 U/L (ref 9–39)
BACTERIA #/AREA URNS AUTO: ABNORMAL /HPF
BASOPHILS # BLD AUTO: 0.02 X10*3/UL (ref 0–0.1)
BASOPHILS NFR BLD AUTO: 0.2 %
BILIRUB SERPL-MCNC: 0.4 MG/DL (ref 0–1.2)
BILIRUB UR STRIP.AUTO-MCNC: NEGATIVE MG/DL
BUN SERPL-MCNC: 20 MG/DL (ref 6–23)
CALCIUM SERPL-MCNC: 9.6 MG/DL (ref 8.6–10.3)
CHLORIDE SERPL-SCNC: 96 MMOL/L (ref 98–107)
CHOLEST SERPL-MCNC: 198 MG/DL (ref 0–199)
CHOLESTEROL/HDL RATIO: 3
CO2 SERPL-SCNC: 25 MMOL/L (ref 21–32)
COLOR UR: ABNORMAL
CREAT SERPL-MCNC: 0.54 MG/DL (ref 0.5–1.05)
EGFRCR SERPLBLD CKD-EPI 2021: >90 ML/MIN/1.73M*2
EOSINOPHIL # BLD AUTO: 0 X10*3/UL (ref 0–0.7)
EOSINOPHIL NFR BLD AUTO: 0 %
ERYTHROCYTE [DISTWIDTH] IN BLOOD BY AUTOMATED COUNT: 13.2 % (ref 11.5–14.5)
EST. AVERAGE GLUCOSE BLD GHB EST-MCNC: 100 MG/DL
GLUCOSE SERPL-MCNC: 98 MG/DL (ref 74–99)
GLUCOSE UR STRIP.AUTO-MCNC: NORMAL MG/DL
HBA1C MFR BLD: 5.1 %
HCT VFR BLD AUTO: 38.2 % (ref 36–46)
HDLC SERPL-MCNC: 66.4 MG/DL
HGB BLD-MCNC: 12.6 G/DL (ref 12–16)
HOLD SPECIMEN: NORMAL
HOLD SPECIMEN: NORMAL
IMM GRANULOCYTES # BLD AUTO: 0.03 X10*3/UL (ref 0–0.7)
IMM GRANULOCYTES NFR BLD AUTO: 0.4 % (ref 0–0.9)
KETONES UR STRIP.AUTO-MCNC: ABNORMAL MG/DL
LDLC SERPL CALC-MCNC: 116 MG/DL
LEUKOCYTE ESTERASE UR QL STRIP.AUTO: NEGATIVE
LYMPHOCYTES # BLD AUTO: 1.06 X10*3/UL (ref 1.2–4.8)
LYMPHOCYTES NFR BLD AUTO: 12.6 %
MAGNESIUM SERPL-MCNC: 2 MG/DL (ref 1.6–2.4)
MCH RBC QN AUTO: 32.6 PG (ref 26–34)
MCHC RBC AUTO-ENTMCNC: 33 G/DL (ref 32–36)
MCV RBC AUTO: 99 FL (ref 80–100)
MONOCYTES # BLD AUTO: 0.94 X10*3/UL (ref 0.1–1)
MONOCYTES NFR BLD AUTO: 11.1 %
MUCOUS THREADS #/AREA URNS AUTO: ABNORMAL /LPF
NEUTROPHILS # BLD AUTO: 6.39 X10*3/UL (ref 1.2–7.7)
NEUTROPHILS NFR BLD AUTO: 75.7 %
NITRITE UR QL STRIP.AUTO: NEGATIVE
NON HDL CHOLESTEROL: 132 MG/DL (ref 0–149)
NRBC BLD-RTO: 0 /100 WBCS (ref 0–0)
PH UR STRIP.AUTO: 7 [PH]
PLATELET # BLD AUTO: 488 X10*3/UL (ref 150–450)
POTASSIUM SERPL-SCNC: 2.8 MMOL/L (ref 3.5–5.3)
POTASSIUM SERPL-SCNC: 3.3 MMOL/L (ref 3.5–5.3)
PROT SERPL-MCNC: 7.5 G/DL (ref 6.4–8.2)
PROT UR STRIP.AUTO-MCNC: ABNORMAL MG/DL
RBC # BLD AUTO: 3.86 X10*6/UL (ref 4–5.2)
RBC # UR STRIP.AUTO: ABNORMAL /UL
RBC #/AREA URNS AUTO: ABNORMAL /HPF
SODIUM SERPL-SCNC: 135 MMOL/L (ref 136–145)
SP GR UR STRIP.AUTO: >1.05
SQUAMOUS #/AREA URNS AUTO: ABNORMAL /HPF
TRIGL SERPL-MCNC: 78 MG/DL (ref 0–149)
UROBILINOGEN UR STRIP.AUTO-MCNC: NORMAL MG/DL
VLDL: 16 MG/DL (ref 0–40)
WBC # BLD AUTO: 8.4 X10*3/UL (ref 4.4–11.3)
WBC #/AREA URNS AUTO: ABNORMAL /HPF

## 2024-03-30 PROCEDURE — 94640 AIRWAY INHALATION TREATMENT: CPT

## 2024-03-30 PROCEDURE — 80053 COMPREHEN METABOLIC PANEL: CPT | Performed by: PHYSICIAN ASSISTANT

## 2024-03-30 PROCEDURE — 96365 THER/PROPH/DIAG IV INF INIT: CPT

## 2024-03-30 PROCEDURE — 85025 COMPLETE CBC W/AUTO DIFF WBC: CPT

## 2024-03-30 PROCEDURE — 2500000002 HC RX 250 W HCPCS SELF ADMINISTERED DRUGS (ALT 637 FOR MEDICARE OP, ALT 636 FOR OP/ED): Performed by: NURSE PRACTITIONER

## 2024-03-30 PROCEDURE — 2500000002 HC RX 250 W HCPCS SELF ADMINISTERED DRUGS (ALT 637 FOR MEDICARE OP, ALT 636 FOR OP/ED)

## 2024-03-30 PROCEDURE — 36415 COLL VENOUS BLD VENIPUNCTURE: CPT | Performed by: NURSE PRACTITIONER

## 2024-03-30 PROCEDURE — 2500000004 HC RX 250 GENERAL PHARMACY W/ HCPCS (ALT 636 FOR OP/ED): Performed by: NURSE PRACTITIONER

## 2024-03-30 PROCEDURE — 2500000001 HC RX 250 WO HCPCS SELF ADMINISTERED DRUGS (ALT 637 FOR MEDICARE OP): Performed by: NURSE PRACTITIONER

## 2024-03-30 PROCEDURE — 70544 MR ANGIOGRAPHY HEAD W/O DYE: CPT | Performed by: RADIOLOGY

## 2024-03-30 PROCEDURE — 80061 LIPID PANEL: CPT | Performed by: PHYSICIAN ASSISTANT

## 2024-03-30 PROCEDURE — 99232 SBSQ HOSP IP/OBS MODERATE 35: CPT | Performed by: NURSE PRACTITIONER

## 2024-03-30 PROCEDURE — 36415 COLL VENOUS BLD VENIPUNCTURE: CPT | Performed by: PHYSICIAN ASSISTANT

## 2024-03-30 PROCEDURE — 82140 ASSAY OF AMMONIA: CPT | Performed by: NURSE PRACTITIONER

## 2024-03-30 PROCEDURE — 99222 1ST HOSP IP/OBS MODERATE 55: CPT | Performed by: STUDENT IN AN ORGANIZED HEALTH CARE EDUCATION/TRAINING PROGRAM

## 2024-03-30 PROCEDURE — 93005 ELECTROCARDIOGRAM TRACING: CPT

## 2024-03-30 PROCEDURE — 93010 ELECTROCARDIOGRAM REPORT: CPT | Performed by: INTERNAL MEDICINE

## 2024-03-30 PROCEDURE — 2500000004 HC RX 250 GENERAL PHARMACY W/ HCPCS (ALT 636 FOR OP/ED)

## 2024-03-30 PROCEDURE — 83735 ASSAY OF MAGNESIUM: CPT | Performed by: PHYSICIAN ASSISTANT

## 2024-03-30 PROCEDURE — 70551 MRI BRAIN STEM W/O DYE: CPT | Performed by: RADIOLOGY

## 2024-03-30 PROCEDURE — 84132 ASSAY OF SERUM POTASSIUM: CPT

## 2024-03-30 PROCEDURE — 83735 ASSAY OF MAGNESIUM: CPT

## 2024-03-30 PROCEDURE — 70551 MRI BRAIN STEM W/O DYE: CPT

## 2024-03-30 PROCEDURE — 2500000001 HC RX 250 WO HCPCS SELF ADMINISTERED DRUGS (ALT 637 FOR MEDICARE OP)

## 2024-03-30 PROCEDURE — 84132 ASSAY OF SERUM POTASSIUM: CPT | Performed by: NURSE PRACTITIONER

## 2024-03-30 PROCEDURE — 1200000002 HC GENERAL ROOM WITH TELEMETRY DAILY

## 2024-03-30 PROCEDURE — G0378 HOSPITAL OBSERVATION PER HR: HCPCS

## 2024-03-30 PROCEDURE — 70547 MR ANGIOGRAPHY NECK W/O DYE: CPT | Performed by: RADIOLOGY

## 2024-03-30 PROCEDURE — 70547 MR ANGIOGRAPHY NECK W/O DYE: CPT

## 2024-03-30 PROCEDURE — 96366 THER/PROPH/DIAG IV INF ADDON: CPT

## 2024-03-30 PROCEDURE — 83036 HEMOGLOBIN GLYCOSYLATED A1C: CPT | Mod: AHULAB | Performed by: PHYSICIAN ASSISTANT

## 2024-03-30 PROCEDURE — 70544 MR ANGIOGRAPHY HEAD W/O DYE: CPT

## 2024-03-30 PROCEDURE — 85025 COMPLETE CBC W/AUTO DIFF WBC: CPT | Performed by: PHYSICIAN ASSISTANT

## 2024-03-30 RX ORDER — FLUTICASONE PROPIONATE 50 MCG
1 SPRAY, SUSPENSION (ML) NASAL
Status: DISCONTINUED | OUTPATIENT
Start: 2024-03-31 | End: 2024-04-02 | Stop reason: HOSPADM

## 2024-03-30 RX ORDER — ACETAMINOPHEN 500 MG
5 TABLET ORAL NIGHTLY
Status: DISCONTINUED | OUTPATIENT
Start: 2024-03-30 | End: 2024-04-02 | Stop reason: HOSPADM

## 2024-03-30 RX ORDER — FORMOTEROL FUMARATE DIHYDRATE 20 UG/2ML
20 SOLUTION RESPIRATORY (INHALATION)
Status: CANCELLED | OUTPATIENT
Start: 2024-03-30

## 2024-03-30 RX ORDER — POLYETHYLENE GLYCOL 3350 17 G/17G
17 POWDER, FOR SOLUTION ORAL DAILY
Status: DISCONTINUED | OUTPATIENT
Start: 2024-03-31 | End: 2024-04-02 | Stop reason: HOSPADM

## 2024-03-30 RX ORDER — INSULIN LISPRO 100 [IU]/ML
0-5 INJECTION, SOLUTION INTRAVENOUS; SUBCUTANEOUS
Status: DISCONTINUED | OUTPATIENT
Start: 2024-03-31 | End: 2024-03-30

## 2024-03-30 RX ORDER — FLUTICASONE PROPIONATE 50 MCG
1 SPRAY, SUSPENSION (ML) NASAL DAILY PRN
Status: DISCONTINUED | OUTPATIENT
Start: 2024-03-30 | End: 2024-03-30

## 2024-03-30 RX ORDER — ASPIRIN 81 MG/1
81 TABLET ORAL DAILY
Status: DISCONTINUED | OUTPATIENT
Start: 2024-03-31 | End: 2024-04-02 | Stop reason: HOSPADM

## 2024-03-30 RX ORDER — ALBUTEROL SULFATE 0.83 MG/ML
2.5 SOLUTION RESPIRATORY (INHALATION) EVERY 2 HOUR PRN
Status: DISCONTINUED | OUTPATIENT
Start: 2024-03-30 | End: 2024-04-02 | Stop reason: HOSPADM

## 2024-03-30 RX ORDER — ALBUTEROL SULFATE 0.83 MG/ML
2.5 SOLUTION RESPIRATORY (INHALATION) EVERY 2 HOUR PRN
Status: CANCELLED | OUTPATIENT
Start: 2024-03-30

## 2024-03-30 RX ORDER — PROCHLORPERAZINE EDISYLATE 5 MG/ML
5 INJECTION INTRAMUSCULAR; INTRAVENOUS EVERY 6 HOURS PRN
Status: CANCELLED | OUTPATIENT
Start: 2024-03-30

## 2024-03-30 RX ORDER — INSULIN LISPRO 100 [IU]/ML
0-5 INJECTION, SOLUTION INTRAVENOUS; SUBCUTANEOUS
Status: CANCELLED | OUTPATIENT
Start: 2024-03-31

## 2024-03-30 RX ORDER — DEXTROSE 50 % IN WATER (D50W) INTRAVENOUS SYRINGE
12.5
Status: CANCELLED | OUTPATIENT
Start: 2024-03-30

## 2024-03-30 RX ORDER — GABAPENTIN 300 MG/1
300 CAPSULE ORAL 2 TIMES DAILY
Status: DISCONTINUED | OUTPATIENT
Start: 2024-03-31 | End: 2024-03-30

## 2024-03-30 RX ORDER — ACETAMINOPHEN 650 MG/1
650 SUPPOSITORY RECTAL EVERY 4 HOURS PRN
Status: DISCONTINUED | OUTPATIENT
Start: 2024-03-30 | End: 2024-04-02 | Stop reason: HOSPADM

## 2024-03-30 RX ORDER — ATORVASTATIN CALCIUM 40 MG/1
40 TABLET, FILM COATED ORAL NIGHTLY
Status: CANCELLED | OUTPATIENT
Start: 2024-03-30

## 2024-03-30 RX ORDER — ACETAMINOPHEN 650 MG/1
650 SUPPOSITORY RECTAL EVERY 4 HOURS PRN
Status: CANCELLED | OUTPATIENT
Start: 2024-03-30

## 2024-03-30 RX ORDER — BUDESONIDE 0.5 MG/2ML
0.5 INHALANT ORAL
Status: CANCELLED | OUTPATIENT
Start: 2024-03-30

## 2024-03-30 RX ORDER — POTASSIUM CHLORIDE 14.9 MG/ML
20 INJECTION INTRAVENOUS
Status: COMPLETED | OUTPATIENT
Start: 2024-03-30 | End: 2024-03-30

## 2024-03-30 RX ORDER — ACETAMINOPHEN 160 MG/5ML
650 SOLUTION ORAL EVERY 4 HOURS PRN
Status: DISCONTINUED | OUTPATIENT
Start: 2024-03-30 | End: 2024-04-02 | Stop reason: HOSPADM

## 2024-03-30 RX ORDER — PROCHLORPERAZINE EDISYLATE 5 MG/ML
5 INJECTION INTRAMUSCULAR; INTRAVENOUS EVERY 6 HOURS PRN
Status: DISCONTINUED | OUTPATIENT
Start: 2024-03-30 | End: 2024-04-02 | Stop reason: HOSPADM

## 2024-03-30 RX ORDER — ACETAMINOPHEN 650 MG/1
650 SUPPOSITORY RECTAL EVERY 4 HOURS PRN
Status: DISCONTINUED | OUTPATIENT
Start: 2024-03-30 | End: 2024-03-30 | Stop reason: HOSPADM

## 2024-03-30 RX ORDER — ACETAMINOPHEN 160 MG/5ML
650 SOLUTION ORAL EVERY 4 HOURS PRN
Status: CANCELLED | OUTPATIENT
Start: 2024-03-30

## 2024-03-30 RX ORDER — ACETAMINOPHEN 160 MG/5ML
650 SOLUTION ORAL EVERY 4 HOURS PRN
Status: DISCONTINUED | OUTPATIENT
Start: 2024-03-30 | End: 2024-03-30 | Stop reason: HOSPADM

## 2024-03-30 RX ORDER — DEXTROSE 50 % IN WATER (D50W) INTRAVENOUS SYRINGE
12.5
Status: DISCONTINUED | OUTPATIENT
Start: 2024-03-30 | End: 2024-04-02 | Stop reason: HOSPADM

## 2024-03-30 RX ORDER — POTASSIUM CHLORIDE 20 MEQ/1
40 TABLET, EXTENDED RELEASE ORAL ONCE
Status: COMPLETED | OUTPATIENT
Start: 2024-03-30 | End: 2024-03-30

## 2024-03-30 RX ORDER — ASPIRIN 81 MG/1
81 TABLET ORAL DAILY
Status: CANCELLED | OUTPATIENT
Start: 2024-03-31

## 2024-03-30 RX ORDER — ATORVASTATIN CALCIUM 40 MG/1
40 TABLET, FILM COATED ORAL NIGHTLY
Status: DISCONTINUED | OUTPATIENT
Start: 2024-03-31 | End: 2024-04-02 | Stop reason: HOSPADM

## 2024-03-30 RX ORDER — GABAPENTIN 300 MG/1
300 CAPSULE ORAL 2 TIMES DAILY
Status: CANCELLED | OUTPATIENT
Start: 2024-03-30

## 2024-03-30 RX ORDER — ACETAMINOPHEN 325 MG/1
650 TABLET ORAL EVERY 4 HOURS PRN
Status: DISCONTINUED | OUTPATIENT
Start: 2024-03-30 | End: 2024-03-30 | Stop reason: HOSPADM

## 2024-03-30 RX ORDER — ENOXAPARIN SODIUM 100 MG/ML
40 INJECTION SUBCUTANEOUS EVERY 24 HOURS
Status: DISCONTINUED | OUTPATIENT
Start: 2024-03-30 | End: 2024-04-02 | Stop reason: HOSPADM

## 2024-03-30 RX ORDER — DEXTROSE 50 % IN WATER (D50W) INTRAVENOUS SYRINGE
25
Status: CANCELLED | OUTPATIENT
Start: 2024-03-30

## 2024-03-30 RX ORDER — ACETAMINOPHEN 160 MG/5ML
650 SOLUTION ORAL EVERY 4 HOURS PRN
Status: DISCONTINUED | OUTPATIENT
Start: 2024-03-30 | End: 2024-03-30

## 2024-03-30 RX ORDER — ACETAMINOPHEN 325 MG/1
650 TABLET ORAL EVERY 4 HOURS PRN
Status: CANCELLED | OUTPATIENT
Start: 2024-03-30

## 2024-03-30 RX ORDER — ALBUTEROL SULFATE 90 UG/1
1 AEROSOL, METERED RESPIRATORY (INHALATION) EVERY 6 HOURS PRN
Status: DISCONTINUED | OUTPATIENT
Start: 2024-03-30 | End: 2024-04-02 | Stop reason: HOSPADM

## 2024-03-30 RX ORDER — FORMOTEROL FUMARATE DIHYDRATE 20 UG/2ML
20 SOLUTION RESPIRATORY (INHALATION)
Status: DISCONTINUED | OUTPATIENT
Start: 2024-03-31 | End: 2024-04-02 | Stop reason: HOSPADM

## 2024-03-30 RX ORDER — FLUTICASONE PROPIONATE 50 MCG
1 SPRAY, SUSPENSION (ML) NASAL DAILY PRN
Status: CANCELLED | OUTPATIENT
Start: 2024-03-30

## 2024-03-30 RX ORDER — GABAPENTIN 300 MG/1
300 CAPSULE ORAL 2 TIMES DAILY
Status: DISCONTINUED | OUTPATIENT
Start: 2024-03-30 | End: 2024-04-02 | Stop reason: HOSPADM

## 2024-03-30 RX ORDER — ACETAMINOPHEN 325 MG/1
650 TABLET ORAL EVERY 4 HOURS PRN
Status: DISCONTINUED | OUTPATIENT
Start: 2024-03-30 | End: 2024-04-02 | Stop reason: HOSPADM

## 2024-03-30 RX ORDER — INSULIN LISPRO 100 [IU]/ML
0-5 INJECTION, SOLUTION INTRAVENOUS; SUBCUTANEOUS
Status: DISCONTINUED | OUTPATIENT
Start: 2024-03-31 | End: 2024-04-02 | Stop reason: HOSPADM

## 2024-03-30 RX ORDER — BUDESONIDE 0.5 MG/2ML
0.5 INHALANT ORAL
Status: DISCONTINUED | OUTPATIENT
Start: 2024-03-31 | End: 2024-04-02 | Stop reason: HOSPADM

## 2024-03-30 RX ORDER — ACETAMINOPHEN 325 MG/1
650 TABLET ORAL EVERY 4 HOURS PRN
Status: DISCONTINUED | OUTPATIENT
Start: 2024-03-30 | End: 2024-03-30

## 2024-03-30 RX ORDER — FLUTICASONE FUROATE AND VILANTEROL 100; 25 UG/1; UG/1
1 POWDER RESPIRATORY (INHALATION)
Status: DISCONTINUED | OUTPATIENT
Start: 2024-03-31 | End: 2024-03-30

## 2024-03-30 RX ORDER — DEXTROSE 50 % IN WATER (D50W) INTRAVENOUS SYRINGE
25
Status: DISCONTINUED | OUTPATIENT
Start: 2024-03-30 | End: 2024-04-02 | Stop reason: HOSPADM

## 2024-03-30 RX ADMIN — ASPIRIN 81 MG: 81 TABLET, COATED ORAL at 08:38

## 2024-03-30 RX ADMIN — FORMOTEROL FUMARATE DIHYDRATE 20 MCG: 20 SOLUTION RESPIRATORY (INHALATION) at 07:19

## 2024-03-30 RX ADMIN — ENOXAPARIN SODIUM 40 MG: 100 INJECTION SUBCUTANEOUS at 23:55

## 2024-03-30 RX ADMIN — GABAPENTIN 300 MG: 300 CAPSULE ORAL at 20:10

## 2024-03-30 RX ADMIN — BUDESONIDE INHALATION 0.5 MG: 0.5 SUSPENSION RESPIRATORY (INHALATION) at 20:22

## 2024-03-30 RX ADMIN — FORMOTEROL FUMARATE DIHYDRATE 20 MCG: 20 SOLUTION RESPIRATORY (INHALATION) at 20:22

## 2024-03-30 RX ADMIN — POTASSIUM CHLORIDE 20 MEQ: 14.9 INJECTION, SOLUTION INTRAVENOUS at 13:24

## 2024-03-30 RX ADMIN — ATORVASTATIN CALCIUM 40 MG: 40 TABLET, FILM COATED ORAL at 20:11

## 2024-03-30 RX ADMIN — Medication 5 MG: at 23:56

## 2024-03-30 RX ADMIN — POTASSIUM CHLORIDE 40 MEQ: 1500 TABLET, EXTENDED RELEASE ORAL at 09:04

## 2024-03-30 RX ADMIN — POTASSIUM CHLORIDE 20 MEQ: 14.9 INJECTION, SOLUTION INTRAVENOUS at 11:13

## 2024-03-30 RX ADMIN — GABAPENTIN 300 MG: 300 CAPSULE ORAL at 23:55

## 2024-03-30 RX ADMIN — GABAPENTIN 300 MG: 300 CAPSULE ORAL at 08:38

## 2024-03-30 RX ADMIN — BUDESONIDE INHALATION 0.5 MG: 0.5 SUSPENSION RESPIRATORY (INHALATION) at 07:19

## 2024-03-30 RX ADMIN — ACETAMINOPHEN 650 MG: 325 TABLET ORAL at 08:38

## 2024-03-30 ASSESSMENT — COGNITIVE AND FUNCTIONAL STATUS - GENERAL
EATING MEALS: A LITTLE
MOBILITY SCORE: 24
DAILY ACTIVITIY SCORE: 23
EATING MEALS: A LITTLE
MOBILITY SCORE: 24
DAILY ACTIVITIY SCORE: 23

## 2024-03-30 ASSESSMENT — ACTIVITIES OF DAILY LIVING (ADL): LACK_OF_TRANSPORTATION: NO

## 2024-03-30 ASSESSMENT — PAIN SCALES - GENERAL
PAINLEVEL_OUTOF10: 4
PAINLEVEL_OUTOF10: 0 - NO PAIN
PAINLEVEL_OUTOF10: 0 - NO PAIN

## 2024-03-30 ASSESSMENT — PAIN - FUNCTIONAL ASSESSMENT: PAIN_FUNCTIONAL_ASSESSMENT: 0-10

## 2024-03-30 ASSESSMENT — PAIN DESCRIPTION - DESCRIPTORS: DESCRIPTORS: ACHING

## 2024-03-30 NOTE — DISCHARGE SUMMARY
TRANSFER TO [facility] FOR CONTINUOUS EEG     Discharge Diagnosis  Altered mental status    Issues Requiring Follow-Up  TRANSFER TO [facility] FOR CONTINUOUS EEG     Discharge Meds     Your medication list        ASK your doctor about these medications        Instructions Last Dose Given Next Dose Due   albuterol 90 mcg/actuation inhaler  Ask about: Which instructions should I use?           amLODIPine 5 mg tablet  Commonly known as: Norvasc      Take 1 tablet (5 mg) by mouth once daily.       fluticasone 50 mcg/actuation nasal spray  Commonly known as: Flonase      Administer 1 spray into each nostril once daily.       fluticasone propion-salmeteroL 100-50 mcg/dose diskus inhaler  Commonly known as: Advair Diskus      Inhale 1 puff 2 times a day. Rinse mouth with water after use to reduce aftertaste and incidence of candidiasis. Do not swallow.       gabapentin 300 mg capsule  Commonly known as: Neurontin      take 1 capsule by mouth twice a day       Rinvoq 15 mg tablet extended release 24 hr  Generic drug: upadacitinib ER      TAKE 1 TABLET BY MOUTH 1 TIME A DAY.                Test Results Pending At Discharge  Pending Labs       No current pending labs.            Hospital Course  TRANSFER TO [facility] FOR CONTINUOUS EEG   Sherin Mckinnon is a 38 y.o. female presenting with past medical history of celiac disease, seizure-like activity, PTSD, anxiety, asthma, history of bacterial endocarditis presented to the emergency department for altered mental status.      The patient's parents are at bedside.  They did help facilitate a lot of the information since the patient's short-term memory is not the best.     The patient states that she has been dealing with confusion for the last 2-3 weeks.  It started when she called her boyfriend and thought that they both worked at the same place but over the last couple days it started to get worse.  She currently is unaware of where she works she is unaware of how long she has worked there and  she is unaware of medications that she is taking.  Earlier this morning she started to have episodes of emesis, thankfully nonbloody nonbilious with associated abdominal pain.  There was some questionable seizure-like activity at home and the family was concerned so they brought her to the emergency room.     Currently she states that she feels dizzy.  She has not had much of an appetite today and has not ate or drink anything.  No associated chest pain, shortness of breath, vision changes or headache.  No edema.  She has full range of motion of all her extremities.  No diarrhea no hematuria or burning with urination.     As stated previously, she has had episodes like this back in 2020 and it was all worked up virtually through the [facility].  She has never had a full workup for her seizure-like activities in the last 4 years.  She does not follow on a regular basis with a neurologist.       Pertinent Physical Exam At Time of Discharge  Physical Exam  Constitutional:       General: She is not in acute distress.     Appearance: Normal appearance.   HENT:      Head: Normocephalic and atraumatic.      Mouth/Throat:      Mouth: Mucous membranes are moist.      Pharynx: Oropharynx is clear. No oropharyngeal exudate or posterior oropharyngeal erythema.      Comments: Bit lip. Scratches to nose  Eyes:      Extraocular Movements: Extraocular movements intact.      Conjunctiva/sclera: Conjunctivae normal.      Pupils: Pupils are equal, round, and reactive to light.   Cardiovascular:      Rate and Rhythm: Normal rate and regular rhythm.      Pulses: Normal pulses.      Heart sounds: Normal heart sounds. No murmur heard.  Pulmonary:      Effort: Pulmonary effort is normal. No respiratory distress.      Breath sounds: Normal breath sounds. No wheezing or rhonchi.   Chest:      Chest wall: No tenderness.   Abdominal:      General: Abdomen is flat. Bowel sounds are normal. There is no distension.      Palpations: Abdomen  is soft. There is no mass.      Tenderness: There is no abdominal tenderness.   Musculoskeletal:         General: Normal range of motion.      Cervical back: Normal range of motion and neck supple.      Right lower leg: No edema.      Left lower leg: No edema.   Skin:     General: Skin is warm and dry.      Capillary Refill: Capillary refill takes less than 2 seconds.   Neurological:      General: No focal deficit present.      Mental Status: She is alert and oriented to person, place, and time.      Cranial Nerves: No cranial nerve deficit.      Motor: No weakness.   Psychiatric:         Mood and Affect: Mood normal.      Outpatient Follow-Up  Future Appointments   Date Time Provider Department Center   10/14/2024  2:00 PM Erinn Vargas MD [id number]         EVA Grey-CNP

## 2024-03-30 NOTE — H&P
History Of Present Illness  Sherin Mckinnon is a 38 y.o. female presenting with past medical history of celiac disease, seizure-like activity, PTSD, anxiety, asthma, history of bacterial endocarditis presented to the emergency department for altered mental status.     The patient's parents are at bedside.  They did help facilitate a lot of the information since the patient's short-term memory is not the best.    The patient states that she has been dealing with confusion for the last 2-3 weeks.  It started when she called her boyfriend and thought that they both worked at the same place but over the last couple days it started to get worse.  She currently is unaware of where she works she is unaware of how long she has worked there and she is unaware of medications that she is taking.  Earlier this morning she started to have episodes of emesis, thankfully nonbloody nonbilious with associated abdominal pain.  There was some questionable seizure-like activity at home and the family was concerned so they brought her to the emergency room.    Currently she states that she feels dizzy.  She has not had much of an appetite today and has not ate or drink anything.  No associated chest pain, shortness of breath, vision changes or headache.  No edema.  She has full range of motion of all her extremities.  No diarrhea no hematuria or burning with urination.    As stated previously, she has had episodes like this back in 2020 and it was all worked up virtually through the [facility].  She has never had a full workup for her seizure-like activities in the last 4 years.  She does not follow on a regular basis with a neurologist.       Past Medical History  She has a past medical history of Personal history of other diseases of the circulatory system and Unspecified cataract.    Surgical History  She has a past surgical history that includes Other surgical history (07/21/2022).     Social History  She reports that she has  never smoked. She has never been exposed to tobacco smoke. She has never used smokeless tobacco. She reports current alcohol use of about 4.0 standard drinks of alcohol per week. She reports that she does not use drugs.    Family History  Family History   Problem Relation Name Age of Onset    Breast cancer Mother      Allergies Other          Allergies  Other, Peanut, Fish containing products, Gluten protein, Penicillins, Soy, Tree nut, Corticosteroids (glucocorticoids), and Prednisone    Review of Systems  I reviewed the complete 30-point review of systems that was documented on the scanned patient intake form.  All other systems are non-contributory except as defined in history of present illness.       Physical Exam   GEN: Sitting in no acute distress. Well-appearing, appears stated age  HEAD: Atraumatic, normocephalic  EYES: EOMI. Pupils equal and reactive.   HEENT: MMM. No oropharyngeal erythema, exudates, or uvular deviation.   Neck: Supple, full ROM  CVS: Regular rate and rhythm, no murmurs, gallops, or rubs  PULM: Clear to auscultation bilaterally. No wheezes, rales, rhonchi.   ABD: Soft, nontender to palpation. No rigidity, guarding, or tympany  EXT: +2 radial and DP pulses bilaterally. No pitting edema noted. No varicose veins  NEURO: Awake with eyes open, following providers and interactive and having conversations.  No drift in upper or lower extremities.  Does take a second to answer questions.  When asked about her name it took her about a minute to answer.  She was unaware of where she was.  She did not know where she worked or what she did for work.  She was able to discuss activities that happened in the past over 6+ months ago but anything recent she is unaware of.      Last Recorded Vitals  /90   Pulse 81   Temp 36.3 °C (97.4 °F) (Temporal)   Resp 15   Wt 61.2 kg (135 lb)   SpO2 98%     Relevant Results  Scheduled medications  aspirin, 81 mg, oral, Daily  atorvastatin, 40 mg, oral,  Nightly  [START ON 3/30/2024] insulin lispro, 0-5 Units, subcutaneous, TID with meals      Continuous medications     PRN medications  PRN medications: dextrose, dextrose, glucagon, glucagon    Results for orders placed or performed during the hospital encounter of 03/29/24 (from the past 24 hour(s))   CBC and Auto Differential   Result Value Ref Range    WBC 11.1 4.4 - 11.3 x10*3/uL    nRBC 0.0 0.0 - 0.0 /100 WBCs    RBC 4.05 4.00 - 5.20 x10*6/uL    Hemoglobin 13.1 12.0 - 16.0 g/dL    Hematocrit 39.4 36.0 - 46.0 %    MCV 97 80 - 100 fL    MCH 32.3 26.0 - 34.0 pg    MCHC 33.2 32.0 - 36.0 g/dL    RDW 13.1 11.5 - 14.5 %    Platelets 513 (H) 150 - 450 x10*3/uL    Neutrophils % 84.2 40.0 - 80.0 %    Immature Granulocytes %, Automated 0.4 0.0 - 0.9 %    Lymphocytes % 6.5 13.0 - 44.0 %    Monocytes % 8.8 2.0 - 10.0 %    Eosinophils % 0.0 0.0 - 6.0 %    Basophils % 0.1 0.0 - 2.0 %    Neutrophils Absolute 9.37 (H) 1.20 - 7.70 x10*3/uL    Immature Granulocytes Absolute, Automated 0.05 0.00 - 0.70 x10*3/uL    Lymphocytes Absolute 0.72 (L) 1.20 - 4.80 x10*3/uL    Monocytes Absolute 0.98 0.10 - 1.00 x10*3/uL    Eosinophils Absolute 0.00 0.00 - 0.70 x10*3/uL    Basophils Absolute 0.01 0.00 - 0.10 x10*3/uL   Basic metabolic panel   Result Value Ref Range    Glucose 111 (H) 74 - 99 mg/dL    Sodium 135 (L) 136 - 145 mmol/L    Potassium 3.1 (L) 3.5 - 5.3 mmol/L    Chloride 95 (L) 98 - 107 mmol/L    Bicarbonate 24 21 - 32 mmol/L    Anion Gap 19 10 - 20 mmol/L    Urea Nitrogen 27 (H) 6 - 23 mg/dL    Creatinine 0.62 0.50 - 1.05 mg/dL    eGFR >90 >60 mL/min/1.73m*2    Calcium 10.0 8.6 - 10.3 mg/dL   Phosphorus   Result Value Ref Range    Phosphorus 2.2 (L) 2.5 - 4.9 mg/dL   Magnesium   Result Value Ref Range    Magnesium 2.10 1.60 - 2.40 mg/dL   Hepatic function panel   Result Value Ref Range    Albumin 4.7 3.4 - 5.0 g/dL    Bilirubin, Total 0.3 0.0 - 1.2 mg/dL    Bilirubin, Direct 0.0 0.0 - 0.3 mg/dL    Alkaline Phosphatase 70 33 - 110  U/L    ALT 17 7 - 45 U/L    AST 76 (H) 9 - 39 U/L    Total Protein 8.4 (H) 6.4 - 8.2 g/dL   Lipase   Result Value Ref Range    Lipase 11 9 - 82 U/L   Lactate   Result Value Ref Range    Lactate 0.7 0.4 - 2.0 mmol/L   Troponin I, High Sensitivity   Result Value Ref Range    Troponin I, High Sensitivity 9 0 - 13 ng/L   B-Type Natriuretic Peptide   Result Value Ref Range     (H) 0 - 99 pg/mL   Blood Gas Venous Full Panel   Result Value Ref Range    POCT pH, Venous 7.47 (H) 7.33 - 7.43 pH    POCT pCO2, Venous 34 (L) 41 - 51 mm Hg    POCT pO2, Venous 65 (H) 35 - 45 mm Hg    POCT SO2, Venous 93 (H) 45 - 75 %    POCT Oxy Hemoglobin, Venous 90.9 (H) 45.0 - 75.0 %    POCT Hematocrit Calculated, Venous 39.0 36.0 - 46.0 %    POCT Sodium, Venous 135 (L) 136 - 145 mmol/L    POCT Potassium, Venous 3.2 (L) 3.5 - 5.3 mmol/L    POCT Chloride, Venous 98 98 - 107 mmol/L    POCT Ionized Calicum, Venous 1.12 1.10 - 1.33 mmol/L    POCT Glucose, Venous 108 (H) 74 - 99 mg/dL    POCT Lactate, Venous 1.3 0.4 - 2.0 mmol/L    POCT Base Excess, Venous 1.4 -2.0 - 3.0 mmol/L    POCT HCO3 Calculated, Venous 24.7 22.0 - 26.0 mmol/L    POCT Hemoglobin, Venous 13.0 12.0 - 16.0 g/dL    POCT Anion Gap, Venous 16.0 10.0 - 25.0 mmol/L    Patient Temperature 37.0 degrees Celsius    FiO2 21 %   Human Chorionic Gonadotropin, Serum Quantitative   Result Value Ref Range    HCG, Beta-Quantitative <2 <5 mIU/mL   Sars-CoV-2 and Influenza A/B PCR   Result Value Ref Range    Flu A Result Not Detected Not Detected    Flu B Result Not Detected Not Detected    Coronavirus 2019, PCR Not Detected Not Detected   RSV PCR   Result Value Ref Range    RSV PCR Not Detected Not Detected       CT head wo IV contrast    Result Date: 3/29/2024  Interpreted By:  Zain Stahl, STUDY: CT HEAD WO IV CONTRAST;  3/29/2024 6:44 pm   INDICATION: Signs/Symptoms:ams.   COMPARISON: 11/30/2022   ACCESSION NUMBER(S): [id number]   ORDERING CLINICIAN: KORI DOMÍNGUEZ    TECHNIQUE: Axial noncontrast CT images of the head. Sagittal and coronal reformats were provided. The scan was repeated due to motion artifact.   FINDINGS: BRAIN: No acute intracranial hemorrhage. No mass effect or midline shift. Gray-white matter interfaces are preserved. VENTRICLES and EXTRA-AXIAL SPACES: Normal. EXTRACRANIAL SOFT TISSUES:  Within normal limits. PARANASAL SINUSES/MASTOIDS: The visualized paranasal sinuses and mastoid air cells are aerated. BONES AND ORBITS: No displaced skull fracture. No suspicious osseous lesion.  Orbits are within normal limits. OTHER FINDINGS: None.       1. No acute intracranial hemorrhage or mass effect.   Signed by: Zain Stahl 3/29/2024 7:18 PM Dictation workstation:   [id number]    CT abdomen pelvis w IV contrast    Result Date: 3/29/2024  Interpreted By:  Zain Stahl, STUDY: CT ABDOMEN PELVIS W IV CONTRAST;  3/29/2024 6:44 pm   INDICATION: Signs/Symptoms:abd pain n/v.   COMPARISON: None   ACCESSION NUMBER(S): [id number]   ORDERING CLINICIAN: KORI DOMÍNGUEZ   TECHNIQUE: Axial CT images of the abdomen and pelvis with coronal and sagittal reconstructed images obtained after the administration of intravenous contrast, 75 mL Omnipaque 350.   FINDINGS: LOWER CHEST: Minimal subsegmental atelectasis/scarring. LIVER: Within normal limits. BILE DUCTS: Normal caliber. GALLBLADDER: No calcified stones. No wall thickening. PANCREAS: Within normal limits. SPLEEN: Within normal limits. ADRENALS: Within normal limits. KIDNEYS, URETERS, and BLADDER: No hydronephrosis or renal calculi. Ureters are non-dilated.  Urinary bladder within normal limits. REPRODUCTIVE: Intrauterine device in place. VESSELS: The aorta and IVC appear normal. RETROPERITONEUM and LYMPH NODES: No lymphadenopathy. BOWEL: The stomach is unremarkable. Small bowel is non-dilated. Normal appendix. Mild colonic diverticulosis without evidence of diverticulitis. PERITONEUM: No ascites or free air, no  fluid collection. BODY WALL: Within normal limits. MUSCULOSKELETAL: No acute osseous abnormality or suspicious osseous lesions. Mild chronic appearing height loss/Schmorl's nodes of lower thoracic superior endplates from T7-T11.       1. No acute abdominal or pelvic process. 2. Colonic diverticulosis without evidence of diverticulitis. 3. Mild chronic appearing height loss/Schmorl's nodes of lower thoracic superior endplates from T7-T11.   Signed by: Zain Stahl 3/29/2024 7:14 PM Dictation workstation:   [id number]    XR chest 1 view    Result Date: 3/29/2024  Interpreted By:  Lesia Stanton, STUDY: XR CHEST 1 VIEW;  3/29/2024 3:27 pm   INDICATION: Signs/Symptoms:sob.   COMPARISON: None.   ACCESSION NUMBER(S): [id number]   ORDERING CLINICIAN: KORI DOMÍNGUEZ   FINDINGS: The cardiac silhouette appears prominent.   There is no consolidation or pleural fluid.   There is slight elevation of the right hemidiaphragm.   The mediastinum and bones are unremarkable.   COMPARISON OF FINDING:       No acute cardiopulmonary disease.   MACRO: none   Signed by: Lesia Stanton 3/29/2024 3:34 PM Dictation workstation:   [id number]     Assessment/Plan   Principal Problem:    Altered mental status  Active Problems:    TIA (transient ischemic attack)      We are going to admit the patient for observation.    We are going to continue her workup for potential TIA and altered mental status.  A neuro consult was placed.  We are going to continue her workup with the appropriate MRI testing of her head/brain.       Jen Holland PA-C

## 2024-03-30 NOTE — CONSULTS
"Consults    History Of Present Illness  Carie Mckinnon \"Susan" is a 38 y.o. female presenting with significant history of celiac disease, previous seizure-like activity, PTSD, anxiety, asthma she has a history of bacterial endocarditis presenting to the emergency department for altered mental status.  Patient presents that she did have diagnosis of seizure for which I was consulted at this time.  She says that in 2020 she had episodes when she would feel confused and after that she would have jerking movements of her arms and legs.  Sometimes she would have tongue biting.  This is followed by altered awareness and then she will get back to her baseline.  She sought care for that at that time was started her on levetiracetam.  Despite treatment with Keppra she did not get any better hence to start taking Keppra themselves and did not see any physician.  Now comes back for altered mental status again which happened yesterday.  In addition to that this time she did not have any limb movements but she had lip biting.  She is still altered.  There were no other seizure semiology described.  Patient's family wants to address everything possible to get to the bottom of it hence they brought her at this time.  Neurology was consulted for further care.  In the emergency room she said that she was dizzy.  She had not much of an appetite there was no associated constitutional symptoms.  Acute imaging was unremarkable in form of PET/CT..  Past Medical History  Past Medical History:   Diagnosis Date    Personal history of other diseases of the circulatory system     History of SBE (subacute bacterial endocarditis)    Unspecified cataract     Cataracts, both eyes     Surgical History  Past Surgical History:   Procedure Laterality Date    OTHER SURGICAL HISTORY  07/21/2022    No history of surgery     Social History  Social History     Tobacco Use    Smoking status: Never     Passive exposure: Never    Smokeless tobacco: Never " "  Vaping Use    Vaping Use: Never used   Substance Use Topics    Alcohol use: Yes     Alcohol/week: 4.0 standard drinks of alcohol     Types: 4 Standard drinks or equivalent per week    Drug use: Never     Allergies  Other, Peanut, Fish containing products, Gluten protein, Penicillins, Soy, Tree nut, Corticosteroids (glucocorticoids), and Prednisone  Medications Prior to Admission   Medication Sig Dispense Refill Last Dose    albuterol 90 mcg/actuation inhaler Inhale 1 puff every 6 hours if needed.   Unknown    amLODIPine (Norvasc) 5 mg tablet Take 1 tablet (5 mg) by mouth once daily. (Patient not taking: Reported on 3/29/2024) 30 tablet 5 Not Taking    fluticasone (Flonase) 50 mcg/actuation nasal spray Administer 1 spray into each nostril once daily. 16 g 0 Unknown    fluticasone propion-salmeteroL (Advair Diskus) 100-50 mcg/dose diskus inhaler Inhale 1 puff 2 times a day. Rinse mouth with water after use to reduce aftertaste and incidence of candidiasis. Do not swallow. 60 each 1 Unknown    gabapentin (Neurontin) 300 mg capsule take 1 capsule by mouth twice a day 60 capsule 6 3/28/2024    Rinvoq 15 mg tablet extended release 24 hr TAKE 1 TABLET BY MOUTH 1 TIME A DAY. 30 tablet 11 Past Week       Review of Systems as noted in HPI and above.  Altered mental status.  Neurological Exam  Somewhat encephalopathic but responds to commands.  Facial symmetry is present.  Extraocular movements full, no nystagmus noted.  No abnormal facial twitches or arm twitches.  Pupils reactive VOR intact.  Moves all 4 extremities to noxious and with some tactile stimulation no focality in motor system could be appreciated.  Sensations difficult to assess precisely.  Reflexes appear symmetric.  Gait deferred.  No abnormal involuntary limb movements noted.  Physical Exam  Last Recorded Vitals  Blood pressure (!) 137/101, pulse 74, temperature 36.7 °C (98.1 °F), temperature source Temporal, resp. rate 18, height 1.626 m (5' 4\"), weight " 61.2 kg (135 lb), SpO2 98 %, not currently breastfeeding.    Relevant Results  Scheduled medications  aspirin, 81 mg, oral, Daily  atorvastatin, 40 mg, oral, Nightly  budesonide, 0.5 mg, nebulization, BID   And  formoterol, 20 mcg, nebulization, BID  gabapentin, 300 mg, oral, BID  insulin lispro, 0-5 Units, subcutaneous, TID with meals  upadacitinib ER, 15 mg, oral, Daily      Continuous medications     PRN medications  PRN medications: acetaminophen **OR** acetaminophen **OR** acetaminophen, albuterol, dextrose, dextrose, fluticasone, glucagon, glucagon, prochlorperazine  Results for orders placed or performed during the hospital encounter of 03/29/24 (from the past 24 hour(s))   CBC and Auto Differential   Result Value Ref Range    WBC 11.1 4.4 - 11.3 x10*3/uL    nRBC 0.0 0.0 - 0.0 /100 WBCs    RBC 4.05 4.00 - 5.20 x10*6/uL    Hemoglobin 13.1 12.0 - 16.0 g/dL    Hematocrit 39.4 36.0 - 46.0 %    MCV 97 80 - 100 fL    MCH 32.3 26.0 - 34.0 pg    MCHC 33.2 32.0 - 36.0 g/dL    RDW 13.1 11.5 - 14.5 %    Platelets 513 (H) 150 - 450 x10*3/uL    Neutrophils % 84.2 40.0 - 80.0 %    Immature Granulocytes %, Automated 0.4 0.0 - 0.9 %    Lymphocytes % 6.5 13.0 - 44.0 %    Monocytes % 8.8 2.0 - 10.0 %    Eosinophils % 0.0 0.0 - 6.0 %    Basophils % 0.1 0.0 - 2.0 %    Neutrophils Absolute 9.37 (H) 1.20 - 7.70 x10*3/uL    Immature Granulocytes Absolute, Automated 0.05 0.00 - 0.70 x10*3/uL    Lymphocytes Absolute 0.72 (L) 1.20 - 4.80 x10*3/uL    Monocytes Absolute 0.98 0.10 - 1.00 x10*3/uL    Eosinophils Absolute 0.00 0.00 - 0.70 x10*3/uL    Basophils Absolute 0.01 0.00 - 0.10 x10*3/uL   Basic metabolic panel   Result Value Ref Range    Glucose 111 (H) 74 - 99 mg/dL    Sodium 135 (L) 136 - 145 mmol/L    Potassium 3.1 (L) 3.5 - 5.3 mmol/L    Chloride 95 (L) 98 - 107 mmol/L    Bicarbonate 24 21 - 32 mmol/L    Anion Gap 19 10 - 20 mmol/L    Urea Nitrogen 27 (H) 6 - 23 mg/dL    Creatinine 0.62 0.50 - 1.05 mg/dL    eGFR >90 >60  mL/min/1.73m*2    Calcium 10.0 8.6 - 10.3 mg/dL   Phosphorus   Result Value Ref Range    Phosphorus 2.2 (L) 2.5 - 4.9 mg/dL   Magnesium   Result Value Ref Range    Magnesium 2.10 1.60 - 2.40 mg/dL   Hepatic function panel   Result Value Ref Range    Albumin 4.7 3.4 - 5.0 g/dL    Bilirubin, Total 0.3 0.0 - 1.2 mg/dL    Bilirubin, Direct 0.0 0.0 - 0.3 mg/dL    Alkaline Phosphatase 70 33 - 110 U/L    ALT 17 7 - 45 U/L    AST 76 (H) 9 - 39 U/L    Total Protein 8.4 (H) 6.4 - 8.2 g/dL   Lipase   Result Value Ref Range    Lipase 11 9 - 82 U/L   Lactate   Result Value Ref Range    Lactate 0.7 0.4 - 2.0 mmol/L   Troponin I, High Sensitivity   Result Value Ref Range    Troponin I, High Sensitivity 9 0 - 13 ng/L   B-Type Natriuretic Peptide   Result Value Ref Range     (H) 0 - 99 pg/mL   Blood Gas Venous Full Panel   Result Value Ref Range    POCT pH, Venous 7.47 (H) 7.33 - 7.43 pH    POCT pCO2, Venous 34 (L) 41 - 51 mm Hg    POCT pO2, Venous 65 (H) 35 - 45 mm Hg    POCT SO2, Venous 93 (H) 45 - 75 %    POCT Oxy Hemoglobin, Venous 90.9 (H) 45.0 - 75.0 %    POCT Hematocrit Calculated, Venous 39.0 36.0 - 46.0 %    POCT Sodium, Venous 135 (L) 136 - 145 mmol/L    POCT Potassium, Venous 3.2 (L) 3.5 - 5.3 mmol/L    POCT Chloride, Venous 98 98 - 107 mmol/L    POCT Ionized Calicum, Venous 1.12 1.10 - 1.33 mmol/L    POCT Glucose, Venous 108 (H) 74 - 99 mg/dL    POCT Lactate, Venous 1.3 0.4 - 2.0 mmol/L    POCT Base Excess, Venous 1.4 -2.0 - 3.0 mmol/L    POCT HCO3 Calculated, Venous 24.7 22.0 - 26.0 mmol/L    POCT Hemoglobin, Venous 13.0 12.0 - 16.0 g/dL    POCT Anion Gap, Venous 16.0 10.0 - 25.0 mmol/L    Patient Temperature 37.0 degrees Celsius    FiO2 21 %   Human Chorionic Gonadotropin, Serum Quantitative   Result Value Ref Range    HCG, Beta-Quantitative <2 <5 mIU/mL   Sars-CoV-2 and Influenza A/B PCR   Result Value Ref Range    Flu A Result Not Detected Not Detected    Flu B Result Not Detected Not Detected    Coronavirus  2019, PCR Not Detected Not Detected   RSV PCR   Result Value Ref Range    RSV PCR Not Detected Not Detected   Drug Screen, Urine   Result Value Ref Range    Amphetamine Screen, Urine Presumptive Negative Presumptive Negative    Barbiturate Screen, Urine Presumptive Negative Presumptive Negative    Benzodiazepines Screen, Urine Presumptive Negative Presumptive Negative    Cannabinoid Screen, Urine Presumptive Positive (A) Presumptive Negative    Cocaine Metabolite Screen, Urine Presumptive Negative Presumptive Negative    Fentanyl Screen, Urine Presumptive Negative Presumptive Negative    Opiate Screen, Urine Presumptive Negative Presumptive Negative    Oxycodone Screen, Urine Presumptive Negative Presumptive Negative    PCP Screen, Urine Presumptive Negative Presumptive Negative    Methadone Screen, Urine Presumptive Negative Presumptive Negative   Urinalysis with Reflex Culture and Microscopic   Result Value Ref Range    Color, Urine Light-Yellow Light-Yellow, Yellow, Dark-Yellow    Appearance, Urine Clear Clear    Specific Gravity, Urine >1.050 (N) 1.005 - 1.035    pH, Urine 7.0 5.0, 5.5, 6.0, 6.5, 7.0, 7.5, 8.0    Protein, Urine 300 (3+) (A) NEGATIVE, 10 (TRACE), 20 (TRACE) mg/dL    Glucose, Urine Normal Normal mg/dL    Blood, Urine 0.1 (1+) (A) NEGATIVE    Ketones, Urine 100 (3+) (A) NEGATIVE mg/dL    Bilirubin, Urine NEGATIVE NEGATIVE    Urobilinogen, Urine Normal Normal mg/dL    Nitrite, Urine NEGATIVE NEGATIVE    Leukocyte Esterase, Urine NEGATIVE NEGATIVE   Extra Urine Gray Tube   Result Value Ref Range    Extra Tube Hold for add-ons.    Urinalysis Microscopic   Result Value Ref Range    WBC, Urine 1-5 1-5, NONE /HPF    RBC, Urine 3-5 NONE, 1-2, 3-5 /HPF    Squamous Epithelial Cells, Urine 1-9 (SPARSE) Reference range not established. /HPF    Bacteria, Urine 1+ (A) NONE SEEN /HPF    Mucus, Urine FEW Reference range not established. /LPF   Hemoglobin A1C   Result Value Ref Range    Hemoglobin A1C 5.1 see  below %    Estimated Average Glucose 100 Not Established mg/dL   Comprehensive metabolic panel   Result Value Ref Range    Glucose 98 74 - 99 mg/dL    Sodium 135 (L) 136 - 145 mmol/L    Potassium 2.8 (LL) 3.5 - 5.3 mmol/L    Chloride 96 (L) 98 - 107 mmol/L    Bicarbonate 25 21 - 32 mmol/L    Anion Gap 17 10 - 20 mmol/L    Urea Nitrogen 20 6 - 23 mg/dL    Creatinine 0.54 0.50 - 1.05 mg/dL    eGFR >90 >60 mL/min/1.73m*2    Calcium 9.6 8.6 - 10.3 mg/dL    Albumin 4.3 3.4 - 5.0 g/dL    Alkaline Phosphatase 64 33 - 110 U/L    Total Protein 7.5 6.4 - 8.2 g/dL    AST 89 (H) 9 - 39 U/L    Bilirubin, Total 0.4 0.0 - 1.2 mg/dL    ALT 19 7 - 45 U/L   Magnesium   Result Value Ref Range    Magnesium 2.00 1.60 - 2.40 mg/dL   Lipid Panel   Result Value Ref Range    Cholesterol 198 0 - 199 mg/dL    HDL-Cholesterol 66.4 mg/dL    Cholesterol/HDL Ratio 3.0     LDL Calculated 116 (H) <=99 mg/dL    VLDL 16 0 - 40 mg/dL    Triglycerides 78 0 - 149 mg/dL    Non HDL Cholesterol 132 0 - 149 mg/dL   CBC and Auto Differential   Result Value Ref Range    WBC 8.4 4.4 - 11.3 x10*3/uL    nRBC 0.0 0.0 - 0.0 /100 WBCs    RBC 3.86 (L) 4.00 - 5.20 x10*6/uL    Hemoglobin 12.6 12.0 - 16.0 g/dL    Hematocrit 38.2 36.0 - 46.0 %    MCV 99 80 - 100 fL    MCH 32.6 26.0 - 34.0 pg    MCHC 33.0 32.0 - 36.0 g/dL    RDW 13.2 11.5 - 14.5 %    Platelets 488 (H) 150 - 450 x10*3/uL    Neutrophils % 75.7 40.0 - 80.0 %    Immature Granulocytes %, Automated 0.4 0.0 - 0.9 %    Lymphocytes % 12.6 13.0 - 44.0 %    Monocytes % 11.1 2.0 - 10.0 %    Eosinophils % 0.0 0.0 - 6.0 %    Basophils % 0.2 0.0 - 2.0 %    Neutrophils Absolute 6.39 1.20 - 7.70 x10*3/uL    Immature Granulocytes Absolute, Automated 0.03 0.00 - 0.70 x10*3/uL    Lymphocytes Absolute 1.06 (L) 1.20 - 4.80 x10*3/uL    Monocytes Absolute 0.94 0.10 - 1.00 x10*3/uL    Eosinophils Absolute 0.00 0.00 - 0.70 x10*3/uL    Basophils Absolute 0.02 0.00 - 0.10 x10*3/uL   Potassium   Result Value Ref Range    Potassium  3.3 (L) 3.5 - 5.3 mmol/L   Ammonia   Result Value Ref Range    Ammonia 25 16 - 53 umol/L   Lavender Top   Result Value Ref Range    Extra Tube Hold for add-ons.        NIH Stroke Scale  1A. Level of Consciousness: Alert, Keenly Responsive  1B. Ask Month and Age: Both Questions Right  1C. Blink Eyes & Squeeze Hands: Performs Both Tasks  5A. Motor - Left Arm: No Drift  5B. Motor - Right Arm: No Drift  6A. Motor - Left Leg: No Drift  6B. Motor - Right Leg: No Drift  9. Best Language: No Aphasia  10. Dysarthria: Normal           Monet Coma Scale  Best Eye Response: Spontaneous  Best Verbal Response: Confused  Best Motor Response: Follows commands  Monet Coma Scale Score: 14                 I have personally reviewed the following imaging results CT head wo IV contrast    Result Date: 3/29/2024  Interpreted By:  Zain Stahl, STUDY: CT HEAD WO IV CONTRAST;  3/29/2024 6:44 pm   INDICATION: Signs/Symptoms:ams.   COMPARISON: 11/30/2022   ACCESSION NUMBER(S): [id number]   ORDERING CLINICIAN: KORI DOMÍNGUEZ   TECHNIQUE: Axial noncontrast CT images of the head. Sagittal and coronal reformats were provided. The scan was repeated due to motion artifact.   FINDINGS: BRAIN: No acute intracranial hemorrhage. No mass effect or midline shift. Gray-white matter interfaces are preserved. VENTRICLES and EXTRA-AXIAL SPACES: Normal. EXTRACRANIAL SOFT TISSUES:  Within normal limits. PARANASAL SINUSES/MASTOIDS: The visualized paranasal sinuses and mastoid air cells are aerated. BONES AND ORBITS: No displaced skull fracture. No suspicious osseous lesion.  Orbits are within normal limits. OTHER FINDINGS: None.       1. No acute intracranial hemorrhage or mass effect.   Signed by: Zain Stahl 3/29/2024 7:18 PM Dictation workstation:   [id number]    CT abdomen pelvis w IV contrast    Result Date: 3/29/2024  Interpreted By:  Zain Stahl, STUDY: CT ABDOMEN PELVIS W IV CONTRAST;  3/29/2024 6:44 pm   INDICATION:  Signs/Symptoms:abd pain n/v.   COMPARISON: None   ACCESSION NUMBER(S): [id number]   ORDERING CLINICIAN: KORI DOMÍNGUEZ   TECHNIQUE: Axial CT images of the abdomen and pelvis with coronal and sagittal reconstructed images obtained after the administration of intravenous contrast, 75 mL Omnipaque 350.   FINDINGS: LOWER CHEST: Minimal subsegmental atelectasis/scarring. LIVER: Within normal limits. BILE DUCTS: Normal caliber. GALLBLADDER: No calcified stones. No wall thickening. PANCREAS: Within normal limits. SPLEEN: Within normal limits. ADRENALS: Within normal limits. KIDNEYS, URETERS, and BLADDER: No hydronephrosis or renal calculi. Ureters are non-dilated.  Urinary bladder within normal limits. REPRODUCTIVE: Intrauterine device in place. VESSELS: The aorta and IVC appear normal. RETROPERITONEUM and LYMPH NODES: No lymphadenopathy. BOWEL: The stomach is unremarkable. Small bowel is non-dilated. Normal appendix. Mild colonic diverticulosis without evidence of diverticulitis. PERITONEUM: No ascites or free air, no fluid collection. BODY WALL: Within normal limits. MUSCULOSKELETAL: No acute osseous abnormality or suspicious osseous lesions. Mild chronic appearing height loss/Schmorl's nodes of lower thoracic superior endplates from T7-T11.       1. No acute abdominal or pelvic process. 2. Colonic diverticulosis without evidence of diverticulitis. 3. Mild chronic appearing height loss/Schmorl's nodes of lower thoracic superior endplates from T7-T11.   Signed by: Zain Stahl 3/29/2024 7:14 PM Dictation workstation:   [id number]    XR chest 1 view    Result Date: 3/29/2024  Interpreted By:  Lesia Stanton, STUDY: XR CHEST 1 VIEW;  3/29/2024 3:27 pm   INDICATION: Signs/Symptoms:sob.   COMPARISON: None.   ACCESSION NUMBER(S): [id number]   ORDERING CLINICIAN: KORI DOMÍNGUEZ   FINDINGS: The cardiac silhouette appears prominent.   There is no consolidation or pleural fluid.   There is slight elevation of the  right hemidiaphragm.   The mediastinum and bones are unremarkable.   COMPARISON OF FINDING:       No acute cardiopulmonary disease.   MACRO: none   Signed by: Lesia Maximino 3/29/2024 3:34 PM Dictation workstation:   [id number]    XR ankle left 3+ views    Result Date: 3/14/2024  * * *Final Report* * * DATE OF EXAM: Mar 14 2024  3:22PM   CCX   [id number]  -  XR ANKLE 3V AP/LAT/OBL LT  / ACCESSION #  [id number] PROCEDURE REASON: Avascular necrosis of left talus (HCC)      * * * * Physician Interpretation * * * *  EXAMINATION:  XR ANKLE 3V AP/LAT/OBL LT HISTORY:   post op f/u  Avascular necrosis of left talus (HCC)   . TECHNIQUE:  XR ANKLE 3V AP/LAT/OBL LT    Laterality:  LEFT    Number of different views (projections): 3    M:  XB_1 COMPARISON:  Left ankle radiograph 12/01/2024 RESULT: Postoperative changes of talus replacement.  Hardware appears to be intact and in good alignment.  Ankle mortise is congruent.  No acute fracture or dislocation.  Joint spaces are maintained. 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    IMPRESSION: Left ankle talar replacement, unchanged. : PSCB   Transcribe Date/Time: Mar 14 2024  3:45P Dictated by : SERGIO LEOS MD This examination was interpreted and the report reviewed and electronically signed by: XIN GUERIN MD on Mar 14 2024  3:54PM  EST  .      Assessment/Plan   Principal Problem:    Altered mental status  Active Problems:    TIA (transient ischemic attack)    The patient is a 38-year-old woman with a history of seizures presenting for altered mental status.  There was some lip biting as well.  There is some history of seizure in the past for which Keppra was not responding.  Now presenting again.  MRI pending EEG pending.  The concern here is a epilepsy versus nonepileptic spells.  Suggesting a transfer to epilepsy monitoring unit for further workup.  The family wants to get to the bottom of it and get to the  diagnosis.  This would be an appropriate next step.  The primary team is going to discuss with [facility] [place] the same.  If she cannot go to [facility] [place] at this time then we will get EEG and MRI with and without contrast here.  Subsequently we will d start her on Vimpat 100 mg twice a day.  This is given the fact that she had such seizures and did not respond to Keppra.  Will follow through with the primary team.     I spent 60 minutes in the professional and overall care of this patient.      Aasef G Shaikh, MD PhD

## 2024-03-30 NOTE — PROGRESS NOTES
"Pharmacy Medication History Review    Carie Mckinnon \"Susan" is a 38 y.o. female admitted for Altered mental status. Pharmacy reviewed the patient's [id number] medications and allergies for accuracy.    The list below reflectives the updated PTA list. Please review each medication in order reconciliation for additional clarification and justification.  Prior to Admission Medications   Prescriptions Last Dose Informant   Rinvoq 15 mg tablet extended release 24 hr Unknown    Sig: TAKE 1 TABLET BY MOUTH 1 TIME A DAY.   albuterol 90 mcg/actuation inhaler Unknown    Sig: Inhale 1 puff every 6 hours if needed.   amLODIPine (Norvasc) 5 mg tablet Not Taking    Sig: Take 1 tablet (5 mg) by mouth once daily.   Patient not taking: Reported on 3/29/2024   fluticasone (Flonase) 50 mcg/actuation nasal spray Unknown    Sig: Administer 1 spray into each nostril once daily.   fluticasone propion-salmeteroL (Advair Diskus) 100-50 mcg/dose diskus inhaler Unknown    Sig: Inhale 1 puff 2 times a day. Rinse mouth with water after use to reduce aftertaste and incidence of candidiasis. Do not swallow.   gabapentin (Neurontin) 300 mg capsule Unknown    Sig: take 1 capsule by mouth twice a day      Facility-Administered Medications: None         The list below reflectives the updated allergy list. Please review each documented allergy for additional clarification and justification.  Allergies  Reviewed by Jorge Doty MD on 3/29/2024        Severity Reactions Comments    Other High Anaphylaxis Any type of steroids, pt will break out in generalized rash    Peanut High Unknown, Anaphylaxis Pt reports anaphylaxis reaction, swelling.    Fish Containing Products Medium Swelling, Unknown     Gluten Protein Not Specified Other, Diarrhea     Penicillins Not Specified Other fever    Soy Not Specified Swelling, Unknown     Tree Nut Not Specified Unknown Pt reports anaphylaxis reaction, swelling.    Corticosteroids (glucocorticoids) Low " Rash Severe dermatitis from steroid withdrawal Patient reports flare of her atopic dermatitis after cessation of steroids, but has never had a reaction to prednisone    Prednisone Low Rash Patient reports flare of her atopic dermatitis after cessation of steroids, but has never had a reaction to prednisone            Below are additional concerns with the patient's PTA list.  Spoke with patient with Family at bedside, they were not too sure if the patient was taking Vimpat . I have no fill history.    Mandie Alcantar

## 2024-03-30 NOTE — SIGNIFICANT EVENT
Discussed with neurologist.  Recommend transfer downtown for continuous EEG.  Order placed to transfer center.  Spoke with transfer center and neurologist, Dr Laura, who is willing to accept patient.  [facility]

## 2024-03-30 NOTE — PROGRESS NOTES
"Carie Mckinnon \"Susan" is a 38 y.o. female who presented to ed on 3/29/2024 presenting with Altered mental status.    Subjective    Patient resting comfortably in bed.  No apparent distress.  Denies any headache at this time but reports feeling like the room is spinning.  Parents at bedside to help provide history.  Parents report that patient was not acting right Thursday night so they let her go to bed and went to check on her Friday.  Friday morning she was staring at them not speaking not interacting properly.  They noted she bit her tongue.  They did not notice any full body jerking.  Dad also notes she has been having some trouble over the past month or 2 feeling loopy and having some memory issues.  She was on the phone with her boyfriend and was talking him about working at the same job though they do not work at the same job.  She also has been forgetting to pay her bills during the last months.  She has a history of being on gabapentin.    Dad notes she had 5 seizures in 1 day with full body shaking back in 2020.  They did not take her to the doctor at this time.  She did have virtual visits with [facility] at this time.  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  They tried her on Keppra and she had too much nausea or nausea vomiting and lost so much weight that they had to stop the medication.  No other medication was prescribed.  She did have neuropathy and was put on gabapentin and kept on gabapentin as it also is an anticonvulsant.  Dad is concerned she is not safe at home    Review of systems   Negative except as noted above        Objective   Physical Exam   Physical Exam  Constitutional:       General: She is not in acute distress.     Appearance: Normal appearance.   HENT:      Head: Normocephalic and atraumatic.      Mouth/Throat:      Mouth: Mucous membranes are moist.      Pharynx: Oropharynx is clear. No " oropharyngeal exudate or posterior oropharyngeal erythema.      Comments: Bit lip. Scratches to nose  Eyes:      Extraocular Movements: Extraocular movements intact.      Conjunctiva/sclera: Conjunctivae normal.      Pupils: Pupils are equal, round, and reactive to light.   Cardiovascular:      Rate and Rhythm: Normal rate and regular rhythm.      Pulses: Normal pulses.      Heart sounds: Normal heart sounds. No murmur heard.  Pulmonary:      Effort: Pulmonary effort is normal. No respiratory distress.      Breath sounds: Normal breath sounds. No wheezing or rhonchi.   Chest:      Chest wall: No tenderness.   Abdominal:      General: Abdomen is flat. Bowel sounds are normal. There is no distension.      Palpations: Abdomen is soft. There is no mass.      Tenderness: There is no abdominal tenderness.   Musculoskeletal:         General: Normal range of motion.      Cervical back: Normal range of motion and neck supple.      Right lower leg: No edema.      Left lower leg: No edema.   Skin:     General: Skin is warm and dry.      Capillary Refill: Capillary refill takes less than 2 seconds.   Neurological:      General: No focal deficit present.      Mental Status: She is alert and oriented to person, place, and time.      Cranial Nerves: No cranial nerve deficit.      Motor: No weakness.   Psychiatric:         Mood and Affect: Mood normal.         Last Recorded Vitals  BP (!) 137/101 Comment: nurse notified  Pulse 74   Temp 36.7 °C (98.1 °F) (Temporal)   Resp 18   Wt 61.2 kg (135 lb)   SpO2 98%   Intake/Output last 3 Shifts:    Intake/Output Summary (Last 24 hours) at 3/30/2024 1637  Last data filed at 3/30/2024 1524  Gross per 24 hour   Intake 200 ml   Output --   Net 200 ml     Admission Weight  Weight: 61.2 kg (135 lb) (03/29/24 1447)  Daily Weight  03/29/24 : 61.2 kg (135 lb)      Medications   Scheduled medications  aspirin, 81 mg, oral, Daily  atorvastatin, 40 mg, oral, Nightly  budesonide, 0.5 mg,  nebulization, BID   And  formoterol, 20 mcg, nebulization, BID  gabapentin, 300 mg, oral, BID  insulin lispro, 0-5 Units, subcutaneous, TID with meals  upadacitinib ER, 15 mg, oral, Daily      Continuous medications     PRN medications  PRN medications: acetaminophen **OR** acetaminophen **OR** acetaminophen, albuterol, dextrose, dextrose, fluticasone, glucagon, glucagon, prochlorperazine     Relevant Results  Image Results  CT head wo IV contrast  Narrative: Interpreted By:  Zain Stahl,   STUDY:  CT HEAD WO IV CONTRAST;  3/29/2024 6:44 pm      INDICATION:  Signs/Symptoms:ams.      COMPARISON:  11/30/2022      ACCESSION NUMBER(S):  [id number]      ORDERING CLINICIAN:  KORI DOMÍNGUEZ      TECHNIQUE:  Axial noncontrast CT images of the head. Sagittal and coronal  reformats were provided. The scan was repeated due to motion artifact.      FINDINGS:  BRAIN: No acute intracranial hemorrhage. No mass effect or midline  shift. Gray-white matter interfaces are preserved. VENTRICLES and  EXTRA-AXIAL SPACES: Normal. EXTRACRANIAL SOFT TISSUES:  Within normal  limits. PARANASAL SINUSES/MASTOIDS: The visualized paranasal sinuses  and mastoid air cells are aerated. BONES AND ORBITS: No displaced  skull fracture. No suspicious osseous lesion.  Orbits are within  normal limits. OTHER FINDINGS: None.      Impression: 1. No acute intracranial hemorrhage or mass effect.      Signed by: Zain Stahl 3/29/2024 7:18 PM  Dictation workstation:   [id number]  CT abdomen pelvis w IV contrast  Narrative: Interpreted By:  Zain Stahl,   STUDY:  CT ABDOMEN PELVIS W IV CONTRAST;  3/29/2024 6:44 pm      INDICATION:  Signs/Symptoms:abd pain n/v.      COMPARISON:  None      ACCESSION NUMBER(S):  [id number]      ORDERING CLINICIAN:  KORI DOMÍNGUEZ      TECHNIQUE:  Axial CT images of the abdomen and pelvis with coronal and sagittal  reconstructed images obtained after the administration of intravenous  contrast, 75  mL Omnipaque 350.      FINDINGS:  LOWER CHEST: Minimal subsegmental atelectasis/scarring.  LIVER: Within normal limits.  BILE DUCTS: Normal caliber.  GALLBLADDER: No calcified stones. No wall thickening.  PANCREAS: Within normal limits.  SPLEEN: Within normal limits.  ADRENALS: Within normal limits.  KIDNEYS, URETERS, and BLADDER: No hydronephrosis or renal calculi.  Ureters are non-dilated.  Urinary bladder within normal limits.  REPRODUCTIVE: Intrauterine device in place. VESSELS: The aorta and  IVC appear normal. RETROPERITONEUM and LYMPH NODES: No  lymphadenopathy. BOWEL: The stomach is unremarkable. Small bowel is  non-dilated. Normal appendix. Mild colonic diverticulosis without  evidence of diverticulitis. PERITONEUM: No ascites or free air, no  fluid collection. BODY WALL: Within normal limits.  MUSCULOSKELETAL: No acute osseous abnormality or suspicious osseous  lesions. Mild chronic appearing height loss/Schmorl's nodes of lower  thoracic superior endplates from T7-T11.      Impression: 1. No acute abdominal or pelvic process.  2. Colonic diverticulosis without evidence of diverticulitis.  3. Mild chronic appearing height loss/Schmorl's nodes of lower  thoracic superior endplates from T7-T11.      Signed by: Zain Stahl 3/29/2024 7:14 PM  Dictation workstation:   [id number]  XR chest 1 view  Narrative: Interpreted By:  Lesia Stanton,   STUDY:  XR CHEST 1 VIEW;  3/29/2024 3:27 pm      INDICATION:  Signs/Symptoms:sob.      COMPARISON:  None.      ACCESSION NUMBER(S):  [id number]      ORDERING CLINICIAN:  KORI DOMÍNGUEZ      FINDINGS:  The cardiac silhouette appears prominent.      There is no consolidation or pleural fluid.      There is slight elevation of the right hemidiaphragm.      The mediastinum and bones are unremarkable.      COMPARISON OF FINDING:      Impression: No acute cardiopulmonary disease.      MACRO:  none      Signed by: Lesia Stanton 3/29/2024 3:34 PM  Dictation workstation:    [id number]        Assessment/Plan   Sherin Mckinnon is a 38 y.o. female presenting with past medical history of celiac disease, seizure-like activity, PTSD, anxiety, asthma, history of bacterial endocarditis presented to the emergency department for altered mental status.      The patient's parents are at bedside.  They did help facilitate a lot of the information since the patient's short-term memory is not the best.     The patient states that she has been dealing with confusion for the last 2-3 weeks.  It started when she called her boyfriend and thought that they both worked at the same place but over the last couple days it started to get worse.  She currently is unaware of where she works she is unaware of how long she has worked there and she is unaware of medications that she is taking.  Earlier this morning she started to have episodes of emesis, thankfully nonbloody nonbilious with associated abdominal pain.  There was some questionable seizure-like activity at home and the family was concerned so they brought her to the emergency room.     Currently she states that she feels dizzy.  She has not had much of an appetite today and has not ate or drink anything.  No associated chest pain, shortness of breath, vision changes or headache.  No edema.  She has full range of motion of all her extremities.  No diarrhea no hematuria or burning with urination.     As stated previously, she has had episodes like this back in 2020 and it was all worked up virtually through the [facility].  She has never had a full workup for her seizure-like activities in the last 4 years.  She does not follow on a regular basis with a neurologist.       Principal Problem:    Altered mental status  Active Problems:    TIA (transient ischemic attack)    Hypokalemia     Ams  - k 2.8 -> 3.3 -- 80 replaced   - ast 89   - ammonia 25   - plt 488  - flu/covid/rsv - neg   - A1c 5.1   - uds - + thc   - ct a/p w contrast - neg for  anything acute.  - hcg - neg  - ct head neg   - mr brain pending   - neuro consult pending     Dvt prophylaxis   - lovenox not indicated, low risk  - scd/ambulate     Gi prophylaxis   - pantopraxole   - miralax     DC plan  - DC home when medically stable  - social work consult     Labs/Testing reviewed:   Interdisciplinary team rounding completed with hospitalist, nurse, TCC  NP discussed plan & lab/testing results with Dr. weber  --- pending neuro consult, mri   45 min spent on professional & overall care of this patient    Jen Alva, EVA-CNP

## 2024-03-31 ENCOUNTER — APPOINTMENT (OUTPATIENT)
Dept: RADIOLOGY | Facility: HOSPITAL | Age: 39
End: 2024-03-31
Payer: COMMERCIAL

## 2024-03-31 LAB
ALBUMIN SERPL BCP-MCNC: 4.6 G/DL (ref 3.4–5)
ANION GAP SERPL CALC-SCNC: 16 MMOL/L (ref 10–20)
BASOPHILS # BLD AUTO: 0.03 X10*3/UL (ref 0–0.1)
BASOPHILS NFR BLD AUTO: 0.4 %
BUN SERPL-MCNC: 14 MG/DL (ref 6–23)
CALCIUM SERPL-MCNC: 9.9 MG/DL (ref 8.6–10.6)
CARDIAC TROPONIN I PNL SERPL HS: 7 NG/L (ref 0–34)
CHLORIDE SERPL-SCNC: 101 MMOL/L (ref 98–107)
CO2 SERPL-SCNC: 20 MMOL/L (ref 21–32)
CREAT SERPL-MCNC: 0.52 MG/DL (ref 0.5–1.05)
EGFRCR SERPLBLD CKD-EPI 2021: >90 ML/MIN/1.73M*2
EOSINOPHIL # BLD AUTO: 0.15 X10*3/UL (ref 0–0.7)
EOSINOPHIL NFR BLD AUTO: 2.1 %
ERYTHROCYTE [DISTWIDTH] IN BLOOD BY AUTOMATED COUNT: 13.1 % (ref 11.5–14.5)
GLUCOSE BLD MANUAL STRIP-MCNC: 118 MG/DL (ref 74–99)
GLUCOSE BLD MANUAL STRIP-MCNC: 125 MG/DL (ref 74–99)
GLUCOSE BLD MANUAL STRIP-MCNC: 88 MG/DL (ref 74–99)
GLUCOSE SERPL-MCNC: 107 MG/DL (ref 74–99)
HCT VFR BLD AUTO: 44.6 % (ref 36–46)
HGB BLD-MCNC: 13.7 G/DL (ref 12–16)
IMM GRANULOCYTES # BLD AUTO: 0.04 X10*3/UL (ref 0–0.7)
IMM GRANULOCYTES NFR BLD AUTO: 0.6 % (ref 0–0.9)
LYMPHOCYTES # BLD AUTO: 0.84 X10*3/UL (ref 1.2–4.8)
LYMPHOCYTES NFR BLD AUTO: 12 %
MAGNESIUM SERPL-MCNC: 2.28 MG/DL (ref 1.6–2.4)
MCH RBC QN AUTO: 32.5 PG (ref 26–34)
MCHC RBC AUTO-ENTMCNC: 30.7 G/DL (ref 32–36)
MCV RBC AUTO: 106 FL (ref 80–100)
MONOCYTES # BLD AUTO: 0.75 X10*3/UL (ref 0.1–1)
MONOCYTES NFR BLD AUTO: 10.7 %
NEUTROPHILS # BLD AUTO: 5.21 X10*3/UL (ref 1.2–7.7)
NEUTROPHILS NFR BLD AUTO: 74.2 %
NRBC BLD-RTO: 0 /100 WBCS (ref 0–0)
PHOSPHATE SERPL-MCNC: 2.3 MG/DL (ref 2.5–4.9)
PLATELET # BLD AUTO: 410 X10*3/UL (ref 150–450)
POTASSIUM SERPL-SCNC: 3.8 MMOL/L (ref 3.5–5.3)
RBC # BLD AUTO: 4.22 X10*6/UL (ref 4–5.2)
SODIUM SERPL-SCNC: 133 MMOL/L (ref 136–145)
WBC # BLD AUTO: 7 X10*3/UL (ref 4.4–11.3)

## 2024-03-31 PROCEDURE — 95715 VEEG EA 12-26HR INTMT MNTR: CPT

## 2024-03-31 PROCEDURE — 82947 ASSAY GLUCOSE BLOOD QUANT: CPT

## 2024-03-31 PROCEDURE — 2500000001 HC RX 250 WO HCPCS SELF ADMINISTERED DRUGS (ALT 637 FOR MEDICARE OP): Performed by: NURSE PRACTITIONER

## 2024-03-31 PROCEDURE — 95700 EEG CONT REC W/VID EEG TECH: CPT

## 2024-03-31 PROCEDURE — 84484 ASSAY OF TROPONIN QUANT: CPT | Performed by: PSYCHIATRY & NEUROLOGY

## 2024-03-31 PROCEDURE — 2500000001 HC RX 250 WO HCPCS SELF ADMINISTERED DRUGS (ALT 637 FOR MEDICARE OP)

## 2024-03-31 PROCEDURE — 99232 SBSQ HOSP IP/OBS MODERATE 35: CPT | Performed by: DERMATOLOGY

## 2024-03-31 PROCEDURE — 70553 MRI BRAIN STEM W/O & W/DYE: CPT

## 2024-03-31 PROCEDURE — 99222 1ST HOSP IP/OBS MODERATE 55: CPT | Performed by: DERMATOLOGY

## 2024-03-31 PROCEDURE — 36415 COLL VENOUS BLD VENIPUNCTURE: CPT | Performed by: PSYCHIATRY & NEUROLOGY

## 2024-03-31 PROCEDURE — 99223 1ST HOSP IP/OBS HIGH 75: CPT | Performed by: STUDENT IN AN ORGANIZED HEALTH CARE EDUCATION/TRAINING PROGRAM

## 2024-03-31 PROCEDURE — 84443 ASSAY THYROID STIM HORMONE: CPT

## 2024-03-31 PROCEDURE — 94640 AIRWAY INHALATION TREATMENT: CPT

## 2024-03-31 PROCEDURE — 84439 ASSAY OF FREE THYROXINE: CPT

## 2024-03-31 PROCEDURE — 95720 EEG PHY/QHP EA INCR W/VEEG: CPT | Performed by: PSYCHIATRY & NEUROLOGY

## 2024-03-31 PROCEDURE — 2500000004 HC RX 250 GENERAL PHARMACY W/ HCPCS (ALT 636 FOR OP/ED)

## 2024-03-31 PROCEDURE — 1200000002 HC GENERAL ROOM WITH TELEMETRY DAILY

## 2024-03-31 PROCEDURE — 2500000002 HC RX 250 W HCPCS SELF ADMINISTERED DRUGS (ALT 637 FOR MEDICARE OP, ALT 636 FOR OP/ED): Performed by: NURSE PRACTITIONER

## 2024-03-31 RX ORDER — LORAZEPAM 2 MG/ML
0.5 INJECTION INTRAMUSCULAR ONCE AS NEEDED
Status: COMPLETED | OUTPATIENT
Start: 2024-03-31 | End: 2024-04-01

## 2024-03-31 RX ORDER — TACROLIMUS 1 MG/G
OINTMENT TOPICAL DAILY PRN
Status: DISCONTINUED | OUTPATIENT
Start: 2024-03-31 | End: 2024-04-02 | Stop reason: HOSPADM

## 2024-03-31 RX ADMIN — ACETAMINOPHEN 650 MG: 325 TABLET ORAL at 21:33

## 2024-03-31 RX ADMIN — ACETAMINOPHEN 650 MG: 325 TABLET ORAL at 01:21

## 2024-03-31 RX ADMIN — Medication: at 21:31

## 2024-03-31 RX ADMIN — FORMOTEROL FUMARATE DIHYDRATE 20 MCG: 20 SOLUTION RESPIRATORY (INHALATION) at 20:33

## 2024-03-31 RX ADMIN — BUDESONIDE 0.5 MG: 0.5 INHALANT RESPIRATORY (INHALATION) at 11:13

## 2024-03-31 RX ADMIN — Medication 5 MG: at 20:32

## 2024-03-31 RX ADMIN — GABAPENTIN 300 MG: 300 CAPSULE ORAL at 09:50

## 2024-03-31 RX ADMIN — GABAPENTIN 300 MG: 300 CAPSULE ORAL at 20:32

## 2024-03-31 RX ADMIN — ATORVASTATIN CALCIUM 40 MG: 40 TABLET, FILM COATED ORAL at 20:32

## 2024-03-31 RX ADMIN — FORMOTEROL FUMARATE DIHYDRATE 20 MCG: 20 SOLUTION RESPIRATORY (INHALATION) at 07:00

## 2024-03-31 RX ADMIN — ENOXAPARIN SODIUM 40 MG: 100 INJECTION SUBCUTANEOUS at 21:32

## 2024-03-31 ASSESSMENT — COGNITIVE AND FUNCTIONAL STATUS - GENERAL
DRESSING REGULAR LOWER BODY CLOTHING: A LITTLE
MOBILITY SCORE: 22
HELP NEEDED FOR BATHING: A LITTLE
DAILY ACTIVITIY SCORE: 21
WALKING IN HOSPITAL ROOM: A LITTLE
DRESSING REGULAR UPPER BODY CLOTHING: A LITTLE
HELP NEEDED FOR BATHING: A LITTLE
DAILY ACTIVITIY SCORE: 21
DRESSING REGULAR UPPER BODY CLOTHING: A LITTLE
MOBILITY SCORE: 22
DRESSING REGULAR LOWER BODY CLOTHING: A LITTLE
CLIMB 3 TO 5 STEPS WITH RAILING: A LITTLE
CLIMB 3 TO 5 STEPS WITH RAILING: A LITTLE
WALKING IN HOSPITAL ROOM: A LITTLE

## 2024-03-31 ASSESSMENT — ENCOUNTER SYMPTOMS
CHILLS: 0
CONFUSION: 1
FEVER: 0
SHORTNESS OF BREATH: 0
WOUND: 0

## 2024-03-31 ASSESSMENT — PAIN - FUNCTIONAL ASSESSMENT: PAIN_FUNCTIONAL_ASSESSMENT: 0-10

## 2024-03-31 ASSESSMENT — PAIN SCALES - GENERAL
PAINLEVEL_OUTOF10: 0 - NO PAIN
PAINLEVEL_OUTOF10: 0 - NO PAIN
PAINLEVEL_OUTOF10: 7
PAINLEVEL_OUTOF10: 6

## 2024-03-31 ASSESSMENT — PAIN DESCRIPTION - LOCATION: LOCATION: HEAD

## 2024-03-31 ASSESSMENT — ACTIVITIES OF DAILY LIVING (ADL): LACK_OF_TRANSPORTATION: NO

## 2024-03-31 NOTE — CARE PLAN
Problem: Fall/Injury  Goal: Not fall by end of shift  3/31/2024 0409 by Amanda Edward RN  Outcome: Progressing  3/31/2024 0405 by Amanda Edward RN  Outcome: Progressing  Goal: Be free from injury by end of the shift  3/31/2024 0409 by Amanda Edward RN  Outcome: Progressing  3/31/2024 0405 by Amanda Edward RN  Outcome: Progressing  Goal: Verbalize understanding of personal risk factors for fall in the hospital  3/31/2024 0409 by Amanda Edward RN  Outcome: Progressing  3/31/2024 0405 by Amanda Edward RN  Outcome: Progressing  Goal: Verbalize understanding of risk factor reduction measures to prevent injury from fall in the home  3/31/2024 0409 by Amanda Edward RN  Outcome: Progressing  3/31/2024 0405 by Amanda Edward RN  Outcome: Progressing  Goal: Use assistive devices by end of the shift  3/31/2024 0409 by Amanda Edward RN  Outcome: Progressing  3/31/2024 0405 by Amanda Edward RN  Outcome: Progressing  Goal: Pace activities to prevent fatigue by end of the shift  3/31/2024 0409 by Amanda Edward RN  Outcome: Progressing  3/31/2024 0405 by Amanda Edward RN  Outcome: Progressing     Problem: Pain - Adult  Goal: Verbalizes/displays adequate comfort level or baseline comfort level  3/31/2024 0409 by Amanda Edward RN  Outcome: Progressing  3/31/2024 0405 by Amanda Edward RN  Outcome: Progressing     Problem: Safety - Adult  Goal: Free from fall injury  3/31/2024 0409 by Amanda Edward RN  Outcome: Progressing  3/31/2024 0405 by Amanda Edward RN  Outcome: Progressing     Problem: Discharge Planning  Goal: Discharge to home or other facility with appropriate resources  3/31/2024 0409 by Amanda Edward RN  Outcome: Progressing  3/31/2024 0405 by Amanda Edward RN  Outcome: Progressing     Problem: Chronic Conditions and Co-morbidities  Goal: Patient's chronic conditions and co-morbidity symptoms are monitored and maintained or improved  3/31/2024 0409 by Amanda Edward  RN  Outcome: Progressing  3/31/2024 0405 by Amanda Edward RN  Outcome: Progressing   The patient's goals for the shift include      The clinical goals for the shift include Patient will remain safe and free from injury throughout shift

## 2024-03-31 NOTE — PROGRESS NOTES
"Overnight Events: No acute events overnight. vEEG preliminary report was normal. No clinical events were captured. No other acute events overnight.    Subjective:  In to see patient this morning. Says she is unaware of if any further events occurred. Again corroborates similar history as noted in H&P. However, this morning she says that she experiences \"grand mal\" seizures. She is unable to specify how often and says she has never been brought in to the emergency room for this. Described as losing consciousness and \"shaking all over.\" She says she occasionally will wake up with tongue biting. Unable to specify the frequency of her staring spells. Says she is under significant stress and having issues with insomnia every night. She additionally says that she has occasional feelings as if \"being in a dream,\" which, on further questioning is described as seeing things as if watching a movie. This lasts about 30 seconds. Says she has actually experienced this since childhood, somewhat reduced in frequency in adulthood. She says there is no correlation between this sensation and her staring or \"grand mal\" spells. No other new concerns this morning.    Her MRI from 3/30 without contrast shows questionable L temporal FLAIR hyperintensity, although study was motion degraded.    Active Medications:  @Scheduled medications  aspirin, 81 mg, oral, Daily  atorvastatin, 40 mg, oral, Nightly  budesonide, 0.5 mg, nebulization, BID   And  formoterol, 20 mcg, nebulization, BID  enoxaparin, 40 mg, subcutaneous, q24h  fluticasone, 1 spray, Each Nostril, Daily  gabapentin, 300 mg, oral, BID  insulin lispro, 0-5 Units, subcutaneous, TID with meals  melatonin, 5 mg, oral, Nightly  polyethylene glycol, 17 g, oral, Daily  upadacitinib ER, 15 mg, oral, Daily  upadacitinib ER, 15 mg, oral, Daily      Continuous medications     PRN medications  PRN medications: acetaminophen **OR** [DISCONTINUED] acetaminophen, acetaminophen **OR** " [DISCONTINUED] acetaminophen **OR** acetaminophen, albuterol, albuterol, dextrose, dextrose, glucagon, glucagon, prochlorperazine     Last Recorded Vitals  Blood pressure (!) 149/96, pulse 72, temperature 36.8 °C (98.2 °F), temperature source Temporal, resp. rate 16, weight 67.1 kg (147 lb 14.9 oz), SpO2 100 %, not currently breastfeeding.    NEUROLOGIC EXAM:    MENTAL STATUS:  - Alert and oriented to person, place, and time  - Recall of recent events intact.   - Speech is hesistant, but fluent. Follows complex commands.   - Thought process is tangential. Thought content is normal.     CRANIAL NERVES:  - CN I: Not Assessed  - CN II: Pupils constrict to light bilaterally 3->2 mm, visual fields intact bilaterally to threat  - CN III, IV, VI: Extraocular movements intact without nystagmus  - CN V: Facial sensation intact to light touch  - CN VII: No facial droop, closes eyes against resistance  - CN VIII: Hearing intact to voice  - CN IX, X: Palate elevates symmetrically  - CN XII: Tongue midline without atrophy or fasciculations    MOTOR:  - No tremors or abnormal movements  - Normal bulk and tone throughout  - Strength: LUE 4/5 strength with give-way weakness, otherwise 5/5 throughout    REFLEXES:   L                R  Biceps            +2               +2  Brachioradialis+2               +2  Patellar             0                  0    COORDINATION: Finger to nose intact bilaterally  SENSATION: Intact and symmetric to light touch in all extremities  GAIT: Not assessed    Relevant Results  Results for orders placed or performed during the hospital encounter of 03/30/24 (from the past 24 hour(s))   CBC and Auto Differential   Result Value Ref Range    WBC 7.0 4.4 - 11.3 x10*3/uL    nRBC 0.0 0.0 - 0.0 /100 WBCs    RBC 4.22 4.00 - 5.20 x10*6/uL    Hemoglobin 13.7 12.0 - 16.0 g/dL    Hematocrit 44.6 36.0 - 46.0 %     (H) 80 - 100 fL    MCH 32.5 26.0 - 34.0 pg    MCHC 30.7 (L) 32.0 - 36.0 g/dL    RDW 13.1 11.5 -  "14.5 %    Platelets 410 150 - 450 x10*3/uL    Neutrophils % 74.2 40.0 - 80.0 %    Immature Granulocytes %, Automated 0.6 0.0 - 0.9 %    Lymphocytes % 12.0 13.0 - 44.0 %    Monocytes % 10.7 2.0 - 10.0 %    Eosinophils % 2.1 0.0 - 6.0 %    Basophils % 0.4 0.0 - 2.0 %    Neutrophils Absolute 5.21 1.20 - 7.70 x10*3/uL    Immature Granulocytes Absolute, Automated 0.04 0.00 - 0.70 x10*3/uL    Lymphocytes Absolute 0.84 (L) 1.20 - 4.80 x10*3/uL    Monocytes Absolute 0.75 0.10 - 1.00 x10*3/uL    Eosinophils Absolute 0.15 0.00 - 0.70 x10*3/uL    Basophils Absolute 0.03 0.00 - 0.10 x10*3/uL   Magnesium   Result Value Ref Range    Magnesium 2.28 1.60 - 2.40 mg/dL   Renal Function Panel   Result Value Ref Range    Glucose 107 (H) 74 - 99 mg/dL    Sodium 133 (L) 136 - 145 mmol/L    Potassium 3.8 3.5 - 5.3 mmol/L    Chloride 101 98 - 107 mmol/L    Bicarbonate 20 (L) 21 - 32 mmol/L    Anion Gap 16 10 - 20 mmol/L    Urea Nitrogen 14 6 - 23 mg/dL    Creatinine 0.52 0.50 - 1.05 mg/dL    eGFR >90 >60 mL/min/1.73m*2    Calcium 9.9 8.6 - 10.6 mg/dL    Phosphorus 2.3 (L) 2.5 - 4.9 mg/dL    Albumin 4.6 3.4 - 5.0 g/dL   POCT GLUCOSE   Result Value Ref Range    POCT Glucose 88 74 - 99 mg/dL         ASSESSMENT  Carie Mckinnon \"Sherin\" is a 38 y.o. female with PMH of celiac disease, atopic dermatitis c/b steroid withdrawal on Dupixant, PTSD, anxiety, and asthma p/w 2-3 week history of confusion. She has previously been seen at [facility] for seizures described as dissociative episodes lasting 30-60 seconds occurring in clusters. EEG's from 2018 have been normal. On exam she is confused appearing, is unable to provide history but is AAO4, and has no deficits, although her lower extremities are hyporeflexic. Her dream-like spells could be consistent with ayazis vu, although these are reportedly lifelong and without ever any correlation to her staring spells, making these less likely to be epileptic. It is unclear if there was ever any witnessed " GTC-like episodes based on self reported grand mal events only. Staring episodes could be consistent with dialepsis. Her EEGs previously and thus far this admission have been normal, although does not appear any events have been captured. Events could be non-epileptic psychogenic events, although some features could be consistent with temporal lobe epilepsy.     4-D Classification of Paroxysmal Event  Semiology 1: Psychic Aura Event -> Dialepsis Event -> Generalized Motor Event  Onset:  (while taking cyclosporine for a rash)  Frequency: 2x per month  Last seizure: 3/30  Etiology: Unknown  Comorbidities: PTSD, Anxiety    Prior AEDs: OXC (Adverse effects, not specified), LEV (nausea, vomiting, weight loss), LCM (dizziness)  Current AEDs: Gabapentin, 300 mg BID    PLAN  #AMS  ::MRI Brain (3/30): no acute hemorrhage or mass seen, possible hyperintensity in medial L temporal lobe     Plan:  Admit to Floor  Q4 hour neuro checks  Pain: Acetaminophen PRN  PT/OT  MRI brain w/ and w/o contrast + possibly HARNESS protocol  cvEEG  Outpatient epilepsy follow-up and will need EMU admission  Patient requires driving restrictions  Pending above work-up, may consider LP for autoimmune/paraneoplastic work-up     #Thrombocytosis  Baseline Hgb 13, Plt 200-300 (nl last values 2022)        Results from last 7 days   Lab Units 24  0338 24  1650   WBC AUTO x10*3/uL 8.4 11.1   HEMOGLOBIN g/dL 12.6 13.1   HEMATOCRIT % 38.2 39.4   PLATELETS AUTO x10*3/uL 488* 513*      Temp (24hrs), Av.5 °C (97.7 °F), Min:36.5 °C (97.7 °F), Max:36.5 °C (97.7 °F)  Plan:  Continue to monitor with CBC     #Chronic Conditions  - Atopic dermatitis: home rinvoq  - Asthma: Home fluticasone, pulmicort, perforomist  - Pain: Home gabapentin, 300 mg BID    F: PRN  E: PRN  N: Gluten free     Bowel: Miralax  DVT PPX: Lovenox  GI PPX: NA  Acess: PIVs     Code status: Full Code   NOK: LILY WISE (Mother) [phone]    General Neurology:  [id number]  Juno Siddiqui MD, Neurology Resident

## 2024-03-31 NOTE — CONSULTS
Inpatient consult to Dermatology  Consult performed by: Leigh Ann Aranda MD  Consult ordered by: Jaden Barrios MD  Reason for consult: uncontrolled atopic dermatitis  Assessment/Recommendations:   - START liberal use of Eucerin cream twice daily; this formulation has been verified with the patient's safe list according to her allergens  - START tacrolimus 0.1% ointment (provide with 30g tube at bedside) as spot treatment to symptomatic areas; discussed with risk of systemic absorption of this medication if applied to large body surface area  - We will discuss patient's hospitalization with Dr. Bear and his team, and ask them to reach out to the patient to update her regarding upadacitinib and plans for follow up      DERMATOLOGY DEPARTMENT CONSULTATION NOTE  Name: Carie Mckinnon  MRN: [id number]  : 1985    Reason for consultation: uncontrolled atopic dermatitis    History of Present Illness  Carie Mckinnon is a 38 y.o. female with a past medical history of atopic dermatitis, allergic contact dermatitis (see below), celiac disease, PTSD, anxiety, asthma, and seizure-like episodes who presented on 3/30/2024 with a 2-3 week history of confusion and was admitted for altered mental status. Dermatology was consulted for uncontrolled atopic dermatitis.    Patient was last seen by Dr. Bear on 24 at where it was documented that the patient had been without her upadacitinib (Rinvoq) since 2024 when a change with insurance made the medication cost-prohibitive. Since the patient had been so well controlled on this medication in the past, the decision was made to try to work with the insurance company to get the medication approved. Of note, the patient states she has no recollection of this visit.    Today, the patient reports that she is starting to have a flare of her atopic dermatitis. She cannot recall the last time she took her upadacitinib. She has not been applying any emollient or  topical medications. Previously when she flared, she reports she was covered head-to-toe. At present, her most bothersome sites include her bilateral ventral wrists, bilateral antecubital fossae, and her bilateral distal shins.    Previous Dermatologic History  The patient first presented to  Dermatology in 2021 and followed with Dr. Kat. She came with a reported history of atopic dermatitis since childhood as well as steroid withdrawal syndrome in her late 20s as she had been utilizing multiple therapeutic modalities concurrently including topical steroids, oral prednisone, and kenalog injections, and the patient was permanently discontinued off all steroids since 2015.    Under Dr. Kat's care, the patient tried and failed dupilumab (Dupixent) and fluconazole. She was patch tested in Jan 2022 and records show reactions to paraben mix, fragrance mix II, and hydroxyperoxides of linalool with reported positivity to fragrance mix I per provider note. The patient's care was transitioned to Dr. Bear in Apr 2022, and she was subsequently started on upadacitinib. 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 Her cardiologist was consulted and echocardiogram obtained prior to her starting the JAK inhibitor. Since that time, she was maintained on this medication along with tacrolimus 0.1% ointment, and strict adherence to her CAMP list.    Of note, the patient is also status post left talectomy due to avascular necrosis of the left talus at [facility] in Dec 2023. The patient and her family report they were told this was likely secondary to her steroid use.        Review of Systems  Review of Systems   Constitutional:  Negative for chills and fever.   Respiratory:  Negative for shortness of breath.    Cardiovascular:  Negative for chest pain.   Skin:  Positive for rash. Negative for wound.    Psychiatric/Behavioral:  Positive for confusion.         Past Medical History  Past Medical History:   Diagnosis Date    Personal history of other diseases of the circulatory system     History of SBE (subacute bacterial endocarditis)    Unspecified cataract     Cataracts, both eyes       Past Surgical History   has a past surgical history that includes Other surgical history (07/21/2022).     Allergies  Allergies   Allergen Reactions    Other Anaphylaxis     Any type of steroids, pt will break out in generalized rash    Peanut Unknown and Anaphylaxis     Pt reports anaphylaxis reaction, swelling.    Fish Containing Products Swelling and Unknown    Gluten Protein Other and Diarrhea    Penicillins Other     fever    Soy Swelling and Unknown    Tree Nut Unknown     Pt reports anaphylaxis reaction, swelling.    Corticosteroids (Glucocorticoids) Rash     Severe dermatitis from steroid withdrawal    Patient reports flare of her atopic dermatitis after cessation of steroids, but has never had a reaction to prednisone    Prednisone Rash     Patient reports flare of her atopic dermatitis after cessation of steroids, but has never had a reaction to prednisone       Medications  Scheduled Meds: aspirin, 81 mg, oral, Daily  atorvastatin, 40 mg, oral, Nightly  budesonide, 0.5 mg, nebulization, BID   And  formoterol, 20 mcg, nebulization, BID  enoxaparin, 40 mg, subcutaneous, q24h  fluticasone, 1 spray, Each Nostril, Daily  gabapentin, 300 mg, oral, BID  insulin lispro, 0-5 Units, subcutaneous, TID with meals  melatonin, 5 mg, oral, Nightly  polyethylene glycol, 17 g, oral, Daily  upadacitinib ER, 15 mg, oral, Daily  upadacitinib ER, 15 mg, oral, Daily       Continuous Infusions:     PRN Meds: PRN medications: acetaminophen **OR** [DISCONTINUED] acetaminophen, acetaminophen **OR** [DISCONTINUED] acetaminophen **OR** acetaminophen, albuterol, albuterol, dextrose, dextrose, glucagon, glucagon, LORazepam, prochlorperazine      Family History  Family History   Problem Relation Name Age of Onset    Breast cancer Mother      Allergies Other         Social History   reports that she has never smoked. She has never been exposed to tobacco smoke. She has never used smokeless tobacco. She reports current alcohol use of about 4.0 standard drinks of alcohol per week. She reports that she does not use drugs.     Objective    Vitals:    03/31/24 0600 03/31/24 0806 03/31/24 1145 03/31/24 1500   BP:  (!) 149/96 (!) 157/97 (!) 160/97   BP Location:       Patient Position:  Lying Lying Lying   Pulse:  72 75 83   Resp:  16 16 16   Temp:  36.8 °C (98.2 °F) 36.8 °C (98.2 °F) 36.8 °C (98.2 °F)   TempSrc:  Temporal Temporal Temporal   SpO2:  100% 98% 97%   Weight: 67.1 kg (147 lb 14.9 oz)           Exam    GEN: no acute distress  NEURO: moving all extremities   EYES: conjunctiva and eyelids normal. No conjunctival injection or erosions appreciated  ENT:   - Lips: normal  - Teeth/gums: normal  NECK: normal and symmetric.   CV: no varicosities, warmth or tenderness of extremities.  GI: Flat abdomen.   EXTREMITIES: no distal digital clubbing, cyanosis, petechiae   SKIN: A full body skin exam including scalp, face, eyes, ears, neck, trunk, bilateral upper & lower extremities, toenails and fingernails were examined with the following findings:  - On bilateral ventral wrists, antecubital fossae, and distal shins, there are erythematous papules with evidence of excoriation, some with overlying crust  - On face and  bilateral upper and lower extremities, erythematous patches with lichenification      Laboratory and Data  Results for orders placed or performed during the hospital encounter of 03/30/24 (from the past 24 hour(s))   CBC and Auto Differential   Result Value Ref Range    WBC 7.0 4.4 - 11.3 x10*3/uL    nRBC 0.0 0.0 - 0.0 /100 WBCs    RBC 4.22 4.00 - 5.20 x10*6/uL    Hemoglobin 13.7 12.0 - 16.0 g/dL    Hematocrit 44.6 36.0 - 46.0 %     (H) 80 -  100 fL    MCH 32.5 26.0 - 34.0 pg    MCHC 30.7 (L) 32.0 - 36.0 g/dL    RDW 13.1 11.5 - 14.5 %    Platelets 410 150 - 450 x10*3/uL    Neutrophils % 74.2 40.0 - 80.0 %    Immature Granulocytes %, Automated 0.6 0.0 - 0.9 %    Lymphocytes % 12.0 13.0 - 44.0 %    Monocytes % 10.7 2.0 - 10.0 %    Eosinophils % 2.1 0.0 - 6.0 %    Basophils % 0.4 0.0 - 2.0 %    Neutrophils Absolute 5.21 1.20 - 7.70 x10*3/uL    Immature Granulocytes Absolute, Automated 0.04 0.00 - 0.70 x10*3/uL    Lymphocytes Absolute 0.84 (L) 1.20 - 4.80 x10*3/uL    Monocytes Absolute 0.75 0.10 - 1.00 x10*3/uL    Eosinophils Absolute 0.15 0.00 - 0.70 x10*3/uL    Basophils Absolute 0.03 0.00 - 0.10 x10*3/uL   Magnesium   Result Value Ref Range    Magnesium 2.28 1.60 - 2.40 mg/dL   Renal Function Panel   Result Value Ref Range    Glucose 107 (H) 74 - 99 mg/dL    Sodium 133 (L) 136 - 145 mmol/L    Potassium 3.8 3.5 - 5.3 mmol/L    Chloride 101 98 - 107 mmol/L    Bicarbonate 20 (L) 21 - 32 mmol/L    Anion Gap 16 10 - 20 mmol/L    Urea Nitrogen 14 6 - 23 mg/dL    Creatinine 0.52 0.50 - 1.05 mg/dL    eGFR >90 >60 mL/min/1.73m*2    Calcium 9.9 8.6 - 10.6 mg/dL    Phosphorus 2.3 (L) 2.5 - 4.9 mg/dL    Albumin 4.6 3.4 - 5.0 g/dL   Troponin I, High Sensitivity   Result Value Ref Range    Troponin I, High Sensitivity 7 0 - 34 ng/L   POCT GLUCOSE   Result Value Ref Range    POCT Glucose 88 74 - 99 mg/dL   POCT GLUCOSE   Result Value Ref Range    POCT Glucose 118 (H) 74 - 99 mg/dL   POCT GLUCOSE   Result Value Ref Range    POCT Glucose 125 (H) 74 - 99 mg/dL        Assessment/Plan   Carie Mckinnon is a 38 y.o. female with a past medical history of atopic dermatitis, allergic contact dermatitis, celiac disease, PTSD, anxiety, asthma, and seizure-like episodes who presented on 3/30/2024 with 2-3 week history of confusion and was admitted for altered mental status. Dermatology was consulted for management of uncontrolled atopic dermatitis.    Differential diagnoses:  atopic dermatitis (BSA currently < 1%, historically severe recalcitrant atopic dermatitis) with diffuse xerosis     Impression: Due to altered mental status, it is possible that the patient has gone days to weeks without any management of her atopic dermatitis. Given that she does not currently have access to her systemic DAO inhibitor maintenance medication and she is undergoing work up with neurology for her confusion, we would opt to treat her topically at this time. She is at risk for her atopic dermatitis flaring given her health stressors and being off of her therapy.      Recommendations:  - START liberal use of Eucerin cream twice daily head to toe as barrier cream; this formulation has been verified with the patient's safe list according to her allergens  - START tacrolimus 0.1% ointment (provide with 30g tube at bedside) as spot treatment twice daily to symptomatic areas; discussed with risk of systemic absorption of this medication if applied to large body surface area; she declines topical steroids given prior steroid withdrawal and her longterm use of potent steroids has led to avascular necrosis of her tarsal heads (see surgical notes from [facility])  - We will discuss patient's hospitalization with Dr. Bear and his team, and ask them to reach out to the patient to update her regarding upadacitinib and plans for follow up as there is currently no FUV scheduled with derm. She did not recall any details from her 3/26/24 virtual visit with Dr. Bear and Dr. Egan.     The patient was seen and discussed with attending physician Dr. Camara. The assessment and plan was communicated to the care team.    Thank you for the consultation and for the opportunity to contribute to the care of this patient.      Leigh Ann Aranda MD   PGY3, Dermatology  Epic chat (preferred)  Team pager [phone]     I saw and evaluated the patient. I personally obtained the key and critical portions of the history and physical exam or was  physically present for key and critical portions performed by the resident/fellow. I reviewed the resident/fellow's documentation and discussed the patient with the resident/fellow. I agree with the resident/fellow's medical decision making as documented in the note.    Pam Camara MD

## 2024-03-31 NOTE — SIGNIFICANT EVENT
"Preliminary EEG Report of baseline (first 25 minutes of EEG)    This vEEG is indicative of mild diffuse encephalopathy. No seizures are seen.     This EEG was read up until 2345 on 03/31/24.  This is only a preliminary read of the first 25 minutes (baseline) of the EEG.   Please see the full report in the EEG database and \"Media\" tab tomorrow.    When ready, to discontinue the vEEG, please place an order to \"discontinue continuous video EEG.\"     Megan Barrientos, DO  Epilepsy Fellow    "

## 2024-03-31 NOTE — H&P
"Subjective   Carie Mckinnon \"Susan" is a 38 y.o. female with PMH of celiac disease, atopic dermatitis c/b steroid withdrawal on Dupixant, PTSD, anxiety, and asthma presenting to [facility] with worsening confusion.     Anamnesis per patient   Per chart review: \" Currently she states that she feels dizzy.  She has not had much of an appetite today and has not ate or drink anything.  No associated chest pain, shortness of breath, vision changes or headache.  No edema.  She has full range of motion of all her extremities.  No diarrhea no hematuria or burning with urination\"     When I interviewed the patient she states she does not know why she is in the hospital and feels okay.     Anamnesis per collateral   2-3 weeks ago per she had called boyfriend and was talking like she was talking to someone at work, and not speaking to him. It was late at night during this episode. When asked about this episode she did not remember the conversation. Her parents had dinner with her 1 week ago and she was normal. Then on Thursday 3/21/23 her parents went over to her place. She had left her car outside which was unusual. She was not answering the phone. Parents found her in the bed. When asked if she was okay she said she was throwing up.  On Fri 3/22/23, parents noticed that she was looking at them funny (staring at them) and not acting like herself. She was not talking, and she had to be carried out of the bed to the ambulance.      She has had episodes like this back in 2020 and it was all worked up virtually through the [facility].  She has never had a full workup for her seizure-like activities in the last 4 years.  She does not follow on a regular basis with a neurologist. At that time shew as talking funny, acting like an animal, unable to communicate. Had to use a carrier to get her into the car. Episodes resolved independently.      No recent vaccinations.  She has had COVID 4-5 times. She recently was feeling ill but " "tested negative for COVID. Per ED labs, Covid/flu/rsv negative. Prior to arrival she was having emesis.     Neurologic History  Per chart review, pt has been seen in the past for reported seizures, initially in [place]. Started in 2016 while taking cyclosporine for a rash, continued after stopping this medication. Started on oxcarb but has side effecets, so switched to Keppra. She then had intractable vomiting and weight loss, stopped LEV and the symptoms resolved. Off ASMs until a recurrent episode in Jan 2018, started on Vimpat but made her feel \"as if she were drunk\" so stopped. Started on gabapentin for neuropathy and it reportedly helped the seizures. Reportedly can go a long time without seizures, has clusters of episodes that occur during stress or other events (ex, in 2020 had \"5 seizures in one day\" after being on Augmentin). Seen by [facility] neurology virtually 8/2020.     LOC after hitting floor after a seizure 3-4 years ago (from 2020). Needed staples.     Description of events per [facility] neurology virtual visit 8/2020:        Prior EEGs:   - aEEG 2/21/18 ([facility]): Twenty-three-hour ambulatory EEG monitoring is normal. No epileptiform abnormalities or electrographic seizures were seen. There were 12 push button events recorded, but no event diary was available at the time of EEG interpretation. No EEG change was seen in association with these events, suggesting they are nonepileptic in nature. Patient stated later that she pushed button for small twitches of extremities/myoclonic-type jerks.   - EEG 1/30/18 ([facility]): Normal  - EEG 3/2018 ([facility] [employer] in [place], [place]) reported as \"normal\"    Past Medical History  Past Medical History:   Diagnosis Date    Personal history of other diseases of the circulatory system     History of SBE (subacute bacterial endocarditis)    Unspecified cataract     Cataracts, both eyes       Surgical History  Past Surgical History:   Procedure Laterality Date    OTHER SURGICAL " HISTORY  07/21/2022    No history of surgery       Social History  Social History     Tobacco Use    Smoking status: Never     Passive exposure: Never    Smokeless tobacco: Never   Vaping Use    Vaping Use: Never used   Substance Use Topics    Alcohol use: Yes     Alcohol/week: 4.0 standard drinks of alcohol     Types: 4 Standard drinks or equivalent per week    Drug use: Never       Allergies  Other, Peanut, Fish containing products, Gluten protein, Penicillins, Soy, Tree nut, Corticosteroids (glucocorticoids), and Prednisone    Medications  Current Outpatient Medications   Medication Instructions    albuterol 90 mcg/actuation inhaler 1 puff, inhalation, Every 6 hours PRN    amLODIPine (NORVASC) 5 mg, oral, Daily    fluticasone (Flonase) 50 mcg/actuation nasal spray 1 spray, Each Nostril, Daily RT    fluticasone propion-salmeteroL (Advair Diskus) 100-50 mcg/dose diskus inhaler 1 puff, inhalation, 2 times daily RT, Rinse mouth with water after use to reduce aftertaste and incidence of candidiasis. Do not swallow.    gabapentin (NEURONTIN) 300 mg, oral, 2 times daily    Rinvoq 15 mg, oral, Daily         Objective   Vitals 24 hour ranges:  Heart Rate:  [73-80]   Temp:  [36.5 °C (97.7 °F)]   Resp:  [18]   BP: (134-157)/()   SpO2:  [94 %-98 %]      Physical Exam:  General Appearance:  No distress, alert, interactive and cooperative.     Mental State:  Confused appearing, was unable to provide history however Orientation was normal to time, place and person. Recent and remote memory impaired. Language testing was normal for comprehension, repetition, expression, and naming.    Cranial Nerves:   CN: Visual fields full to confrontation.   CN 3, 4, 6: Pupils round, 4 mm in diameter, equally reactive to light. Lids symmetric; no ptosis. EOMs normal alignment, full range with normal saccades, pursuit and convergence.   No nystagmus.   CN 5: Facial sensation intact bilaterally.   CN 7: Normal and symmetric facial  strength. Nasolabial folds symmetric.   CN 8: Hearing intact to voice  CN 9: Palate elevates symmetrically.   CN 11: Normal strength of shoulder shrug and neck turning.   CN 12: Tongue midline, with normal bulk and strength; no fasciculations.     Motor: Muscle bulk and tone were normal in both upper and lower extremities. No fasciculations, tremor or other abnormal movements were present.   Strength   R L  Deltoid  5 5  Biceps  5 5  Triceps  5 5    5 5    Hip flexion      5 5  Knee flexion       5 5  Knee extension    5 5  DorsiFlex     5      5  PlantarFlex          5       5    Reflexes: Right/ Left:  Biceps 2/2, brachioradialis 2/2, triceps 2/2, patellar absent/absent, ankle 1/1  toes downgoing to plantar stimulation. No clonus, frontal release signs or other pathologic reflexes present.     Sensory: In both upper and lower extremities, sensation was intact to light touch    Coordination: In both upper extremities, finger-nose-finger was intact without dysmetria or overshoot. In both lower extremities, heel-to-shin was intact. WILLAM were intact in both upper and lower extremities.      Gait: not assessed       Lab Results  Results from last 72 hours   Lab Units 03/30/24  0941 03/30/24  0337 03/29/24  1650   SODIUM mmol/L  --  135* 135*   POTASSIUM mmol/L 3.3* 2.8* 3.1*   BUN mg/dL  --  20 27*   CREATININE mg/dL  --  0.54 0.62   CALCIUM mg/dL  --  9.6 10.0   MAGNESIUM mg/dL  --  2.00 2.10        Results from last 72 hours   Lab Units 03/30/24  0338 03/29/24  1650   WBC AUTO x10*3/uL 8.4 11.1   HEMOGLOBIN g/dL 12.6 13.1   HEMATOCRIT % 38.2 39.4   PLATELETS AUTO x10*3/uL 488* 513*        Results from last 72 hours   Lab Units 03/30/24  0337 03/29/24  1650   AST U/L 89* 76*   ALT U/L 19 17   ALK PHOS U/L 64 70   BILIRUBIN DIRECT mg/dL  --  0.0        Results from last 72 hours   Lab Units 03/29/24  1650   LACTATE mmol/L 0.7   POCT LACTATE, VENOUS mmol/L 1.3        Imaging Results  MRI Brain:  1. The images are  "degraded by motion artifact, especially the thin section T1 and FLAIR images.  2. No acute infarct, hemorrhage or mass is noted.  3. The white matter has nonspecific FLAIR hyperintensity with questionable hyperintensity along the medial aspect of the left temporal lobe. Recommend repeat MR with contrast when the patient is better able to tolerate imaging.      MRA:  1. The cerebral MR angiogram is degraded by motion artifact with signal loss in the ophthalmic segments of the internal carotid arteries most likely related to motion. Underlying stenosis in this region cannot be excluded. The remaining intra cerebral circulation is normal.  2. No cervical carotid/vertebral stenosis is noted.       Assessment/Plan   Carie Mckinnon \"Susan" is a 38 y.o. female with PMH of celiac disease, atopic dermatitis c/b steroid withdrawal on Dupixant, PTSD, anxiety, and asthma p/w 2-3 week history of confusion. She has previously been seen at [facility] for seizures described as dissociative episodes lasting 30-60 seconds occurring in clusters. EEG's from 2018 have been normal. On exam she is confused appearing, is unable to provide history but is AAO4, and has no deficits, although her lower extremities are hyporeflexic. Suspect events are non-epileptic in nature - will obtain cvEEG for event characterization.       PLAN  #AMS  ::MRI Brain (3/30): no acute hemorrhage or mass seen, possible hyperintensity in medial L temporal lobe    Plan:  Admit to Floor  Q4 hour neuro checks  Pain: Acetaminophen PRN  PT/OT  MRI brain HARNESS protocol  cvEEG    #Thrombocytosis  Baseline Hgb 13, Plt 200-300 (nl last values 2022)  Results from last 7 days   Lab Units 24  0338 24  1650   WBC AUTO x10*3/uL 8.4 11.1   HEMOGLOBIN g/dL 12.6 13.1   HEMATOCRIT % 38.2 39.4   PLATELETS AUTO x10*3/uL 488* 513*     Temp (24hrs), Av.5 °C (97.7 °F), Min:36.5 °C (97.7 °F), Max:36.5 °C (97.7 °F)  Plan:  Continue to monitor with CBC      F: PRN  E: " PRN  N: Gluten free    Bowel: Miralax  DVT PPX: Lovenox  GI PPX: NA  Acess: PIVs    Code status: Full Code   NOK: LILY WISE (Mother) [phone]     Note primarily authored by Dr. Chava Swift, and was updated by myself.     Neva Amezquita MD  PGY-2 Neurology  General [phone]    Pt will be formally staffed with the attending physician, Dr. Barrios, in the AM.

## 2024-03-31 NOTE — PROGRESS NOTES
03/31/24 1256   Discharge Planning   Living Arrangements Alone   Support Systems Parent  (mother and father)   Assistance Needed Return to work slip.   Type of Residence Private residence   Do you have animals or pets at home? No   Who is requesting discharge planning? Patient   Home or Post Acute Services None   Patient expects to be discharged to: Home   Does the patient need discharge transport arranged? No  (parents will provide)   Financial Resource Strain   How hard is it for you to pay for the very basics like food, housing, medical care, and heating? Not hard   Housing Stability   In the last 12 months, was there a time when you were not able to pay the mortgage or rent on time? N   In the last 12 months, was there a time when you did not have a steady place to sleep or slept in a shelter (including now)? N   Transportation Needs   In the past 12 months, has lack of transportation kept you from medical appointments or from getting medications? no   In the past 12 months, has lack of transportation kept you from meetings, work, or from getting things needed for daily living? No     Assessment Note:  Met with pt and parents at the bedside,  introduced myself as care coordinator and member of the Care Transitions team for discharge planning.   Pt feels safe at home and works [employer] as a  (pt will need a return to work slip).  Pt was driving to drs appts.  Pt's address, phone number and contact information was verified.  Pt does not have any other questions/concerns at this time.     Previous Home Care: None  DME: BP cuff  Pharmacy: [facility] in [place]  Falls: Last fall was 3 months after having foot surgery.  PCP:  Evelyne Millard (last seen 1 yr ago).    Maureen Simpson MSN, RN-BC  Transitional Care Coordinator (TCC)  [phone]

## 2024-03-31 NOTE — CARE PLAN
Problem: Fall/Injury  Goal: Not fall by end of shift  Outcome: Progressing  Goal: Be free from injury by end of the shift  Outcome: Progressing  Goal: Verbalize understanding of personal risk factors for fall in the hospital  Outcome: Progressing  Goal: Verbalize understanding of risk factor reduction measures to prevent injury from fall in the home  Outcome: Progressing  Goal: Use assistive devices by end of the shift  Outcome: Progressing  Goal: Pace activities to prevent fatigue by end of the shift  Outcome: Progressing     Problem: Pain - Adult  Goal: Verbalizes/displays adequate comfort level or baseline comfort level  Outcome: Progressing     Problem: Safety - Adult  Goal: Free from fall injury  Outcome: Progressing     Problem: Discharge Planning  Goal: Discharge to home or other facility with appropriate resources  Outcome: Progressing     Problem: Chronic Conditions and Co-morbidities  Goal: Patient's chronic conditions and co-morbidity symptoms are monitored and maintained or improved  Outcome: Progressing   The patient's goals for the shift include      The clinical goals for the shift include Patient will remain safe and free from injury throughout shift

## 2024-04-01 LAB
ATRIAL RATE: 70 BPM
ATRIAL RATE: 92 BPM
GLUCOSE BLD MANUAL STRIP-MCNC: 101 MG/DL (ref 74–99)
GLUCOSE BLD MANUAL STRIP-MCNC: 109 MG/DL (ref 74–99)
GLUCOSE BLD MANUAL STRIP-MCNC: 94 MG/DL (ref 74–99)
GLUCOSE BLD MANUAL STRIP-MCNC: 94 MG/DL (ref 74–99)
P AXIS: 5 DEGREES
P AXIS: 52 DEGREES
P OFFSET: 205 MS
P OFFSET: 212 MS
P ONSET: 148 MS
P ONSET: 155 MS
PR INTERVAL: 136 MS
PR INTERVAL: 150 MS
Q ONSET: 223 MS
Q ONSET: 223 MS
QRS COUNT: 12 BEATS
QRS COUNT: 15 BEATS
QRS DURATION: 90 MS
QRS DURATION: 92 MS
QT INTERVAL: 376 MS
QT INTERVAL: 400 MS
QTC CALCULATION(BAZETT): 432 MS
QTC CALCULATION(BAZETT): 464 MS
QTC FREDERICIA: 421 MS
QTC FREDERICIA: 433 MS
R AXIS: -2 DEGREES
R AXIS: 21 DEGREES
T AXIS: 41 DEGREES
T AXIS: 69 DEGREES
T OFFSET: 411 MS
T OFFSET: 423 MS
VENTRICULAR RATE: 70 BPM
VENTRICULAR RATE: 92 BPM

## 2024-04-01 PROCEDURE — 95715 VEEG EA 12-26HR INTMT MNTR: CPT

## 2024-04-01 PROCEDURE — 95720 EEG PHY/QHP EA INCR W/VEEG: CPT | Performed by: PSYCHIATRY & NEUROLOGY

## 2024-04-01 PROCEDURE — 99233 SBSQ HOSP IP/OBS HIGH 50: CPT

## 2024-04-01 PROCEDURE — 2500000004 HC RX 250 GENERAL PHARMACY W/ HCPCS (ALT 636 FOR OP/ED)

## 2024-04-01 PROCEDURE — 2500000001 HC RX 250 WO HCPCS SELF ADMINISTERED DRUGS (ALT 637 FOR MEDICARE OP): Performed by: NURSE PRACTITIONER

## 2024-04-01 PROCEDURE — 96374 THER/PROPH/DIAG INJ IV PUSH: CPT | Mod: 59

## 2024-04-01 PROCEDURE — 2500000002 HC RX 250 W HCPCS SELF ADMINISTERED DRUGS (ALT 637 FOR MEDICARE OP, ALT 636 FOR OP/ED): Performed by: NURSE PRACTITIONER

## 2024-04-01 PROCEDURE — 2500000004 HC RX 250 GENERAL PHARMACY W/ HCPCS (ALT 636 FOR OP/ED): Performed by: STUDENT IN AN ORGANIZED HEALTH CARE EDUCATION/TRAINING PROGRAM

## 2024-04-01 PROCEDURE — 2500000001 HC RX 250 WO HCPCS SELF ADMINISTERED DRUGS (ALT 637 FOR MEDICARE OP)

## 2024-04-01 PROCEDURE — A9575 INJ GADOTERATE MEGLUMI 0.1ML: HCPCS | Performed by: PSYCHIATRY & NEUROLOGY

## 2024-04-01 PROCEDURE — 2500000004 HC RX 250 GENERAL PHARMACY W/ HCPCS (ALT 636 FOR OP/ED): Performed by: NURSE PRACTITIONER

## 2024-04-01 PROCEDURE — 94640 AIRWAY INHALATION TREATMENT: CPT

## 2024-04-01 PROCEDURE — 70553 MRI BRAIN STEM W/O & W/DYE: CPT | Performed by: RADIOLOGY

## 2024-04-01 PROCEDURE — 2500000002 HC RX 250 W HCPCS SELF ADMINISTERED DRUGS (ALT 637 FOR MEDICARE OP, ALT 636 FOR OP/ED)

## 2024-04-01 PROCEDURE — 82947 ASSAY GLUCOSE BLOOD QUANT: CPT

## 2024-04-01 PROCEDURE — 1200000002 HC GENERAL ROOM WITH TELEMETRY DAILY

## 2024-04-01 PROCEDURE — 2550000001 HC RX 255 CONTRASTS: Performed by: PSYCHIATRY & NEUROLOGY

## 2024-04-01 PROCEDURE — 96375 TX/PRO/DX INJ NEW DRUG ADDON: CPT | Mod: 59

## 2024-04-01 PROCEDURE — 99221 1ST HOSP IP/OBS SF/LOW 40: CPT | Performed by: PSYCHIATRY & NEUROLOGY

## 2024-04-01 RX ORDER — GADOTERATE MEGLUMINE 376.9 MG/ML
13 INJECTION INTRAVENOUS
Status: COMPLETED | OUTPATIENT
Start: 2024-04-01 | End: 2024-04-01

## 2024-04-01 RX ADMIN — ENOXAPARIN SODIUM 40 MG: 100 INJECTION SUBCUTANEOUS at 21:42

## 2024-04-01 RX ADMIN — Medication 5 MG: at 21:43

## 2024-04-01 RX ADMIN — LORAZEPAM 0.5 MG: 2 INJECTION INTRAMUSCULAR; INTRAVENOUS at 00:52

## 2024-04-01 RX ADMIN — ASPIRIN 81 MG: 81 TABLET, COATED ORAL at 08:33

## 2024-04-01 RX ADMIN — FLUTICASONE PROPIONATE 1 SPRAY: 50 SPRAY, METERED NASAL at 08:37

## 2024-04-01 RX ADMIN — BUDESONIDE 0.5 MG: 0.5 INHALANT RESPIRATORY (INHALATION) at 20:19

## 2024-04-01 RX ADMIN — ACETAMINOPHEN 650 MG: 325 TABLET ORAL at 21:41

## 2024-04-01 RX ADMIN — PROCHLORPERAZINE EDISYLATE 5 MG: 5 INJECTION INTRAMUSCULAR; INTRAVENOUS at 21:44

## 2024-04-01 RX ADMIN — BUDESONIDE 0.5 MG: 0.5 INHALANT RESPIRATORY (INHALATION) at 09:26

## 2024-04-01 RX ADMIN — ATORVASTATIN CALCIUM 40 MG: 40 TABLET, FILM COATED ORAL at 21:43

## 2024-04-01 RX ADMIN — GABAPENTIN 300 MG: 300 CAPSULE ORAL at 08:33

## 2024-04-01 RX ADMIN — FORMOTEROL FUMARATE DIHYDRATE 20 MCG: 20 SOLUTION RESPIRATORY (INHALATION) at 09:27

## 2024-04-01 RX ADMIN — FORMOTEROL FUMARATE DIHYDRATE 20 MCG: 20 SOLUTION RESPIRATORY (INHALATION) at 20:19

## 2024-04-01 RX ADMIN — GADOTERATE MEGLUMINE 13 ML: 376.9 INJECTION INTRAVENOUS at 01:49

## 2024-04-01 RX ADMIN — Medication: at 21:44

## 2024-04-01 ASSESSMENT — COGNITIVE AND FUNCTIONAL STATUS - GENERAL
MOBILITY SCORE: 24
DAILY ACTIVITIY SCORE: 24

## 2024-04-01 ASSESSMENT — PAIN SCALES - GENERAL
PAINLEVEL_OUTOF10: 3
PAINLEVEL_OUTOF10: 3
PAINLEVEL_OUTOF10: 0 - NO PAIN

## 2024-04-01 ASSESSMENT — PAIN - FUNCTIONAL ASSESSMENT
PAIN_FUNCTIONAL_ASSESSMENT: 0-10

## 2024-04-01 NOTE — CONSULTS
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 claim, all factors could contribute to PTSD flare-up (knowing that patient had severe atopic dermatitis that affected her life and functionality, made her loose her job and live with her parents for years. Pt also had an ICU admission that she describes as traumatic, she endorsed that she gets nightmares 1-2 week about her skin itching or about the time she was in the ICU and intubated, also has flashbacks. Rinvog was the only treatment that gave her relief and allowed her to gradually gain back her life, go back to working and moving to live by her own). So these amnestic episodes happening around the time of atopic dermatitis flare up and insurance declining her Rinvog could raise suspicion for PTSD flare-up    EKG (3/29): QTc of 464    IMPRESSION  Seizures with possible conversion  PTSD     RECOMMENDATIONS  - Will follow-up the Neurology team work-up (EEG, MRI..)  - Will hold off on any psych meds until Neuro work-up is done  - Pt would benefit from establishing outpatient Psychiatry follow-up     Safety:  - Patient does not meet criteria for inpatient psychiatric admission.   Search “ IP Capacity Evaluation under SmartText\" unless the patient has a legal guardian, in which case all decisions per the legal guardian.  - Patient does not require a 1:1 sitter from a psychiatric perspective at this " time.    Work-up:  N/A    Medications:  None--consider     Ancillary Services:  - None    - Discussed recommendations with primary team.  - Psychiatry will continue to follow    Thank you for allowing us to participate in the care of this patient. Please page [phone] with any questions or concerns.    Patient seen and staffed/discussed with Dr. Watson, who agrees with above plan.      Jewell Tristan MD  PGY-4 Neurology

## 2024-04-01 NOTE — CARE PLAN
The patient's goals for the shift include  get patient MRI fulfilled during this shift.    The clinical goals for the shift include Patient will remain safe and free from injury throughout shift.    Over the shift, the patient did not make progress toward the following goals. Barriers to progression include not fulfilling patient bp medicine. Recommendations to address these barriers include looking at patient history to see when she last took said meds, and addressing BP at protocol levels.      Problem: Chronic Conditions and Co-morbidities  Goal: Patient's chronic conditions and co-morbidity symptoms are monitored and maintained or improved  Outcome: Not Progressing

## 2024-04-01 NOTE — PROGRESS NOTES
Sherin Mckinnon is a 38 y.o. female on day 2 of admission presenting with selective forgetfulness and staring spells.    Subjective   No acute event overnight, patient feeling better, no more staring spells since the admission but she still complains of forgetfulness of some events and wants the team to speak to the parents for those forgotten events.    Updates @1400  Spoke to parents at bed side they were concerned about her memory, patient forgot that she had a job and when her parent showed her pictures about her job her memory came back. Forgot about having a ankle surgery in Dec 2023, and during one of the staring episode patient forgot her name. Parents found unpaid bills since January and they are concerned that she is under a lot of stress after started working a full time job.     Of note patient remembers all of her skin medications she provided inconsistent history about her seizure-like activity, patient reported seizure controlled after starting gabapentin for neuropathic pain    Objective   Current Vitals  BP (!) 141/99 (Patient Position: Lying)   Pulse 67   Temp 35.8 °C (96.4 °F) (Temporal)   Resp 16   Wt 70.1 kg (154 lb 8.7 oz)   SpO2 98%   BMI 26.53 kg/m²      I/O last 3 completed shifts:  In: - (0 mL/kg)   Out: 1 (0 mL/kg) [Urine:1 (0 mL/kg/hr)]  Weight: 70.1 kg     Physical Exam  Constitutional:       Appearance: Normal appearance. She is normal weight.   HENT:      Head: Normocephalic and atraumatic.      Nose: Nose normal.   Eyes:      Extraocular Movements: Extraocular movements intact.      Pupils: Pupils are equal, round, and reactive to light.   Cardiovascular:      Rate and Rhythm: Normal rate and regular rhythm.   Pulmonary:      Effort: Pulmonary effort is normal.   Abdominal:      General: Abdomen is flat.      Palpations: Abdomen is soft.   Musculoskeletal:         General: Normal range of motion.   Neurological:      General: No focal deficit present.      Mental Status: She is  alert and oriented to person, place, and time. Mental status is at baseline.      Cranial Nerves: Cranial nerves 2-12 are intact.      Sensory: Sensation is intact.      Motor: Motor function is intact.      Coordination: Coordination is intact. Coordination normal.      Gait: Gait is intact.      Deep Tendon Reflexes: Babinski sign absent on the right side. Babinski sign absent on the left side.      Reflex Scores:       Tricep reflexes are 2+ on the right side and 2+ on the left side.       Bicep reflexes are 2+ on the right side and 2+ on the left side.       Brachioradialis reflexes are 2+ on the right side and 2+ on the left side.       Patellar reflexes are 0 on the right side and 0 on the left side.       Achilles reflexes are 0 on the right side and 0 on the left side.  Psychiatric:         Mood and Affect: Mood normal.     Relevant Results    Labs:  Most recent 03/30/2024  CBC: WBC 7.0 , HGB 13.7,   BMP: , K 3.8, Cl 101, HCO3 20, BUN 14, CR 0.52, Glu 107  LFTS: AST 89 , ALT 19, ALKPHOS 64 , TBILI 0.4 , DBILI 0.0  TROP: 7  BNP: 168    UA:   Results from last 7 days   Lab Units 03/29/24  2306   COLOR U  Light-Yellow   PH U  7.0   SPEC GRAV UR  >1.050*   PROTEIN U mg/dL 300 (3+)*   BLOOD UR  0.1 (1+)*   NITRITE U  NEGATIVE   WBC UR /HPF 1-5   BACTERIA UR /HPF 1+*     ABG:    CALCIUM 9.9 MAG No results found for requested labs within last 365 days. ALB 4.6 LACTATE 0.7 PHOS No results found for requested labs within last 365 days. COVIDNo results found for requested labs within last 365 days.    Micro/culture data:  No results found for the last 90 days.    Imaging:  ECG 12 lead  Normal sinus rhythm  Normal ECG  When compared with ECG of 30-NOV-2022 21:56,  Previous ECG has undetermined rhythm, needs review    ECG 12 lead    Result Date: 4/1/2024  Normal sinus rhythm Normal ECG When compared with ECG of 30-NOV-2022 21:56, Previous ECG has undetermined rhythm, needs review    EEG    IMPRESSION   This  EEG is normal. No epileptiform discharges or lateralizing signs are seen. A full report will be scanned into the patient's chart at a later time. This report has been interpreted and electronically signed by    MR brain wo IV contrast    Result Date: 3/30/2024  FINDINGS: Brain:   CSF Spaces: The ventricles and sulci are normal, unchanged.   Parenchyma: No acute infarct is noted on the diffusion images.   The FLAIR images demonstrate focal white matter hyperintensities with patchy area of increased intensity in the periatrial region. There is questionable increased signal along the medial aspect of the left temporal lobe and insula bilaterally; this may be related to motion artifact however.   No mass or hemorrhage is noted.   Paranasal Sinuses and Mastoids: Visualized paranasal sinuses and mastoid air cells are unremarkable.   MRA of head:   The images are degraded by motion artifact.   Anterior circulation:  The ophthalmic segments of the internal carotid arteries more so on the right have signal loss most likely from artifact. No other stenoses of the anterior cerebral circulation are noted.   Posterior circulation:    Bilateral intracranial vertebral arteries, vertebrobasilar junction, basilar artery and proximal posterior cerebral arteries demonstrate expected flow signal.   MRA of neck:   The source images are mildly degraded by artifact.   Right carotid vessels:  There is expected flow signal in the visualized portion of the common carotid artery.  There is mild attenuation of flow signal at the carotid bifurcation which may be secondary to flow related artifact. The internal carotid artery in the neck demonstrates expected flow signal.  There is 0% stenosis  .   Left carotid vessels:   There is expected flow signal in the visualized portion of the common carotid artery.  There is mild attenuation of flow signal at the carotid bifurcation which may be secondary to flow related artifact. The internal carotid  artery in the neck demonstrates expected flow signal.  There is 0% stenosis  .   Vertebral vessels:   The visualized segments of the cervical vertebral arteries demonstrate expected flow signal.       MRI Brain:  1. The images are degraded by motion artifact, especially the thin section T1 and FLAIR images.   2. No acute infarct, hemorrhage or mass is noted.   3. The white matter has nonspecific FLAIR hyperintensity with questionable hyperintensity along the medial aspect of the left temporal lobe. Recommend repeat MR with contrast when the patient is better able to tolerate imaging.   MRA:   1. The cerebral MR angiogram is degraded by motion artifact with signal loss in the ophthalmic segments of the internal carotid arteries most likely related to motion. Underlying stenosis in this region cannot be excluded. The remaining intra cerebral circulation is normal.   2. No cervical carotid/vertebral stenosis is noted.   MACRO: None   Signed by: Kenyon Arciniega 3/30/2024 6:09 PM Dictation workstation:   [id number]    MR angio head wo IV contrast    Result Date: 3/30/2024  FINDINGS: Brain: CSF Spaces: The ventricles and sulci are normal, unchanged.   Parenchyma: No acute infarct is noted on the diffusion images.   The FLAIR images demonstrate focal white matter hyperintensities with patchy area of increased intensity in the periatrial region. There is questionable increased signal along the medial aspect of the left temporal lobe and insula bilaterally; this may be related to motion artifact however.   No mass or hemorrhage is noted.   Paranasal Sinuses and Mastoids: Visualized paranasal sinuses and mastoid air cells are unremarkable.   MRA of head:   The images are degraded by motion artifact.   Anterior circulation:  The ophthalmic segments of the internal carotid arteries more so on the right have signal loss most likely from artifact. No other stenoses of the anterior cerebral circulation are noted.   Posterior  circulation:    Bilateral intracranial vertebral arteries, vertebrobasilar junction, basilar artery and proximal posterior cerebral arteries demonstrate expected flow signal.   MRA of neck:   The source images are mildly degraded by artifact.   Right carotid vessels:  There is expected flow signal in the visualized portion of the common carotid artery.  There is mild attenuation of flow signal at the carotid bifurcation which may be secondary to flow related artifact. The internal carotid artery in the neck demonstrates expected flow signal.  There is 0% stenosis  .   Left carotid vessels:   There is expected flow signal in the visualized portion of the common carotid artery.  There is mild attenuation of flow signal at the carotid bifurcation which may be secondary to flow related artifact. The internal carotid artery in the neck demonstrates expected flow signal.  There is 0% stenosis  .   Vertebral vessels:   The visualized segments of the cervical vertebral arteries demonstrate expected flow signal.       MRI Brain:   1. The images are degraded by motion artifact, especially the thin section T1 and FLAIR images.   2. No acute infarct, hemorrhage or mass is noted.   3. The white matter has nonspecific FLAIR hyperintensity with questionable hyperintensity along the medial aspect of the left temporal lobe. Recommend repeat MR with contrast when the patient is better able to tolerate imaging.   MRA:   1. The cerebral MR angiogram is degraded by motion artifact with signal loss in the ophthalmic segments of the internal carotid arteries most likely related to motion. Underlying stenosis in this region cannot be excluded. The remaining intra cerebral circulation is normal.   2. No cervical carotid/vertebral stenosis is noted.   MACRO: None   Signed by: Kenyon Arciniega 3/30/2024 6:09 PM Dictation workstation:   [id number]    MR angio neck wo IV contrast    Result Date: 3/30/2024  FINDINGS: Brain:   CSF Spaces: The  ventricles and sulci are normal, unchanged.   Parenchyma: No acute infarct is noted on the diffusion images.   The FLAIR images demonstrate focal white matter hyperintensities with patchy area of increased intensity in the periatrial region. There is questionable increased signal along the medial aspect of the left temporal lobe and insula bilaterally; this may be related to motion artifact however. No mass or hemorrhage is noted.   Paranasal Sinuses and Mastoids: Visualized paranasal sinuses and mastoid air cells are unremarkable.   MRA of head:   The images are degraded by motion artifact.   Anterior circulation:  The ophthalmic segments of the internal carotid arteries more so on the right have signal loss most likely from artifact. No other stenoses of the anterior cerebral circulation are noted.   Posterior circulation:    Bilateral intracranial vertebral arteries, vertebrobasilar junction, basilar artery and proximal posterior cerebral arteries demonstrate expected flow signal.   MRA of neck:   The source images are mildly degraded by artifact. Right carotid vessels:  There is expected flow signal in the visualized portion of the common carotid artery.  There is mild attenuation of flow signal at the carotid bifurcation which may be secondary to flow related artifact. The internal carotid artery in the neck demonstrates expected flow signal.  There is 0% stenosis. Left carotid vessels:   There is expected flow signal in the visualized portion of the common carotid artery.  There is mild attenuation of flow signal at the carotid bifurcation which may be secondary to flow related artifact. The internal carotid artery in the neck demonstrates expected flow signal.  There is 0% stenosis  .   Vertebral vessels:   The visualized segments of the cervical vertebral arteries demonstrate expected flow signal.       MRI Brain:  1. The images are degraded by motion artifact, especially the thin section T1 and FLAIR  images.  2. No acute infarct, hemorrhage or mass is noted.   3. The white matter has nonspecific FLAIR hyperintensity with questionable hyperintensity along the medial aspect of the left temporal lobe. Recommend repeat MR with contrast when the patient is better able to tolerate imaging.   MRA:   1. The cerebral MR angiogram is degraded by motion artifact with signal loss in the ophthalmic segments of the internal carotid arteries most likely related to motion. Underlying stenosis in this region cannot be excluded. The remaining intra cerebral circulation is normal.   2. No cervical carotid/vertebral stenosis is noted.     CT head wo IV contrast    Result Date: 3/29/2024  FINDINGS: BRAIN: No acute intracranial hemorrhage. No mass effect or midline shift. Gray-white matter interfaces are preserved. VENTRICLES and EXTRA-AXIAL SPACES: Normal. EXTRACRANIAL SOFT TISSUES:  Within normal limits. PARANASAL SINUSES/MASTOIDS: The visualized paranasal sinuses and mastoid air cells are aerated. BONES AND ORBITS: No displaced skull fracture. No suspicious osseous lesion.  Orbits are within normal limits. OTHER FINDINGS: None.       1. No acute intracranial hemorrhage or mass effect.    CT abdomen pelvis w IV contrast    Result Date: 3/29/2024  FINDINGS: LOWER CHEST: Minimal subsegmental atelectasis/scarring. LIVER: Within normal limits. BILE DUCTS: Normal caliber. GALLBLADDER: No calcified stones. No wall thickening. PANCREAS: Within normal limits. SPLEEN: Within normal limits. ADRENALS: Within normal limits. KIDNEYS, URETERS, and BLADDER: No hydronephrosis or renal calculi. Ureters are non-dilated.  Urinary bladder within normal limits. REPRODUCTIVE: Intrauterine device in place. VESSELS: The aorta and IVC appear normal. RETROPERITONEUM and LYMPH NODES: No lymphadenopathy. BOWEL: The stomach is unremarkable. Small bowel is non-dilated. Normal appendix. Mild colonic diverticulosis without evidence of diverticulitis. PERITONEUM:  No ascites or free air, no fluid collection. BODY WALL: Within normal limits. MUSCULOSKELETAL: No acute osseous abnormality or suspicious osseous lesions. Mild chronic appearing height loss/Schmorl's nodes of lower thoracic superior endplates from T7-T11.       1. No acute abdominal or pelvic process. 2. Colonic diverticulosis without evidence of diverticulitis. 3. Mild chronic appearing height loss/Schmorl's nodes of lower thoracic superior endplates from T7-T11.     XR chest 1 view    Result Date: 3/29/2024  FINDINGS: The cardiac silhouette appears prominent.   There is no consolidation or pleural fluid.   There is slight elevation of the right hemidiaphragm.   The mediastinum and bones are unremarkable.    COMPARISON OF FINDING:     No acute cardiopulmonary disease.   XR ankle left 3+ views    Result Date: 3/14/2024  RESULT: Postoperative changes of talus replacement.  Hardware appears to be intact and in good alignment.  Ankle mortise is congruent.  No acute fracture or dislocation.  Joint spaces are maintained. 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    IMPRESSION: Left ankle talar replacement, unchanged. : WESLY       MEDS:  Scheduled medications  aspirin, 81 mg, oral, Daily  atorvastatin, 40 mg, oral, Nightly  budesonide, 0.5 mg, nebulization, BID   And  formoterol, 20 mcg, nebulization, BID  enoxaparin, 40 mg, subcutaneous, q24h  eucerin, , Topical, BID  fluticasone, 1 spray, Each Nostril, Daily  gabapentin, 300 mg, oral, BID  insulin lispro, 0-5 Units, subcutaneous, TID with meals  melatonin, 5 mg, oral, Nightly  polyethylene glycol, 17 g, oral, Daily  upadacitinib ER, 15 mg, oral, Daily  upadacitinib ER, 15 mg, oral, Daily    Continuous medications     PRN medications  PRN medications: acetaminophen **OR** [DISCONTINUED] acetaminophen, acetaminophen **OR** [DISCONTINUED] acetaminophen **OR** acetaminophen, albuterol, albuterol, dextrose,  "dextrose, glucagon, glucagon, prochlorperazine, tacrolimus     Assessment/Plan:   Carie Mckinnon \"Susan" is a 38 y.o. female with PMH of celiac disease, atopic dermatitis c/b steroid withdrawal on Dupixant, PTSD, anxiety, and asthma p/w 2-3 week history of confusion. She has previously been seen at [facility] for seizures described as dissociative episodes lasting 30-60 seconds occurring in clusters. EEG's from 2018 have been normal. Normal exam, however unable to provide consistent history but is AAO4, and has no deficits, although her lower extremities are hypo-reflexic. Her selective memory complaint could be consistent with disassociative amnesia. However given Hx of staring spells and episodes of generalized shaking seizure is still in the differential.    4-D Classification of Paroxysmal Event  Semiology 1: Psychic Aura Event -> Dialepsis Event -> Generalized Motor Event  Onset: 2016 (while taking cyclosporine for a rash)  Frequency: 2x per month  Last seizure: 3/30  Etiology: Unknown  Comorbidities: PTSD, Anxiety     Prior AEDs: OXC (Adverse effects, not specified), LEV (nausea, vomiting, weight loss), LCM (dizziness)  Current AEDs: Gabapentin, 300 mg BID    Updates 4/1:  - MRI brain normal temporal lobe, few nonspecific foci of signal abnormality located within the cerebral hemispheric white matter, likely incidental  - Connect to EEG for 24 hours  - Hold gabapentin  - Psychiatry consult appreciate recs  - Outpatient follow up with epilepsy clinic  - Outpatient follow up with psychiatry  - Possible discharge tomorrow    Pain medications: PRN Tylenol Gabapentin  Fluids: Replete PRN  Electrolytes: Keep mg >2, phos >3  and K >4  Nutrition:  Adult diet Gluten Free   Antimicrobials: None  DVT PPX: Lovenox  GI ppx: none needed  Bowel care: Miralax  Catheter: None  Lines: PIV  Oxygen: Room Air    Disposition:   Home    Code Status: Full Code (confirmed on admission)   NOK:  Primary Emergency Contact: " LILY WISE MD

## 2024-04-01 NOTE — DISCHARGE INSTRUCTIONS
"It was a pleasure caring for you! You were admitted to [facility] ([facility]) on 3/30/2024 For increase forgetfulness and seizure like episodes. During hospitalization evaluation of your brain waves were normal, MRI brain was nonspecific white matter changes which can be seen in in chronic headaches no significant abnormality noted.  Psychiatry evaluated you and recommended outpatient follow up.    For you to do:  [ ] Continue Eucerin cream twice daily head to toe as barrier cream, tacrolimus 0.1% ointment (provide with 30g tube at bedside) as spot treatment twice daily to symptomatic areas   [ ] Follow-up with epilepsy clinic. We have referred you to see Yaa Parson NP. Contact information below.  [ ] Follow-up with Psychiatry.  [ ] Follow-up with neuropsychology to evaluate you memory.  [ ] Follow-up with your dermatologist regarding your dermatitis.    Seizure Precautions:  You must not drive, use heavy machinery, climb heights, or engage in any other activity that would cause harm/death to yourself or others were you to lose awareness/consciousness and have a seizure. These restrictions should stay in place until at least 6 months of seizure freedom and clearance from physician. Alcohol and sleep deprivation may provoke seizures. It is best to avoid alcohol and to get sufficient sleep each night.     Compliance education: It is important to continue to try and achieve seizure control because of the potential for injury and illness due to seizures. In a very small minority of patients with generalized tonic clonic seizures (\"grand mal\"), breathing or heart function can stop during a seizure and result in demise (sudden unexpected death in epilepsy or SUDEP). [place] from seizures prevents this kind of outcome.     Yaa Parson/Epilepsy Clinic Scheduling if you are not contacted:  [facility] and [place]: [phone]  [facility]: " [phone]    Thank you for letting us take part in your care.  [facility] Inpatient Neurology Service

## 2024-04-02 VITALS
BODY MASS INDEX: 26.53 KG/M2 | RESPIRATION RATE: 16 BRPM | TEMPERATURE: 95.9 F | WEIGHT: 154.54 LBS | OXYGEN SATURATION: 98 % | HEART RATE: 84 BPM | DIASTOLIC BLOOD PRESSURE: 87 MMHG | SYSTOLIC BLOOD PRESSURE: 123 MMHG

## 2024-04-02 LAB
GLUCOSE BLD MANUAL STRIP-MCNC: 100 MG/DL (ref 74–99)
GLUCOSE BLD MANUAL STRIP-MCNC: 122 MG/DL (ref 74–99)
T4 FREE SERPL-MCNC: 1.39 NG/DL (ref 0.78–1.48)
TSH SERPL-ACNC: 2.09 MIU/L (ref 0.44–3.98)
VIT B12 SERPL-MCNC: 334 PG/ML (ref 211–911)

## 2024-04-02 PROCEDURE — 95720 EEG PHY/QHP EA INCR W/VEEG: CPT | Performed by: PSYCHIATRY & NEUROLOGY

## 2024-04-02 PROCEDURE — 95715 VEEG EA 12-26HR INTMT MNTR: CPT

## 2024-04-02 PROCEDURE — 82947 ASSAY GLUCOSE BLOOD QUANT: CPT

## 2024-04-02 PROCEDURE — 94640 AIRWAY INHALATION TREATMENT: CPT

## 2024-04-02 PROCEDURE — 2500000002 HC RX 250 W HCPCS SELF ADMINISTERED DRUGS (ALT 637 FOR MEDICARE OP, ALT 636 FOR OP/ED): Performed by: NURSE PRACTITIONER

## 2024-04-02 PROCEDURE — 99233 SBSQ HOSP IP/OBS HIGH 50: CPT | Performed by: PSYCHIATRY & NEUROLOGY

## 2024-04-02 PROCEDURE — 82607 VITAMIN B-12: CPT

## 2024-04-02 PROCEDURE — 36415 COLL VENOUS BLD VENIPUNCTURE: CPT

## 2024-04-02 PROCEDURE — G0378 HOSPITAL OBSERVATION PER HR: HCPCS

## 2024-04-02 PROCEDURE — 99238 HOSP IP/OBS DSCHRG MGMT 30/<: CPT

## 2024-04-02 PROCEDURE — 2500000001 HC RX 250 WO HCPCS SELF ADMINISTERED DRUGS (ALT 637 FOR MEDICARE OP): Performed by: NURSE PRACTITIONER

## 2024-04-02 RX ORDER — TACROLIMUS 1 MG/G
OINTMENT TOPICAL
Qty: 100 G | Refills: 0 | Status: SHIPPED | OUTPATIENT
Start: 2024-04-02 | End: 2024-04-19

## 2024-04-02 RX ADMIN — Medication: at 08:47

## 2024-04-02 RX ADMIN — BUDESONIDE 0.5 MG: 0.5 INHALANT RESPIRATORY (INHALATION) at 11:07

## 2024-04-02 RX ADMIN — ACETAMINOPHEN 650 MG: 325 TABLET ORAL at 06:20

## 2024-04-02 RX ADMIN — ACETAMINOPHEN 650 MG: 325 TABLET ORAL at 11:49

## 2024-04-02 RX ADMIN — ASPIRIN 81 MG: 81 TABLET, COATED ORAL at 08:46

## 2024-04-02 RX ADMIN — FLUTICASONE PROPIONATE 1 SPRAY: 50 SPRAY, METERED NASAL at 08:56

## 2024-04-02 RX ADMIN — FORMOTEROL FUMARATE DIHYDRATE 20 MCG: 20 SOLUTION RESPIRATORY (INHALATION) at 11:07

## 2024-04-02 ASSESSMENT — COGNITIVE AND FUNCTIONAL STATUS - GENERAL
MOBILITY SCORE: 24
DAILY ACTIVITIY SCORE: 24

## 2024-04-02 ASSESSMENT — PAIN SCALES - GENERAL
PAINLEVEL_OUTOF10: 7
PAINLEVEL_OUTOF10: 6
PAINLEVEL_OUTOF10: 7
PAINLEVEL_OUTOF10: 8
PAINLEVEL_OUTOF10: 8
PAINLEVEL_OUTOF10: 7

## 2024-04-02 ASSESSMENT — PAIN - FUNCTIONAL ASSESSMENT
PAIN_FUNCTIONAL_ASSESSMENT: 0-10

## 2024-04-02 ASSESSMENT — PAIN DESCRIPTION - LOCATION: LOCATION: HEAD

## 2024-04-02 NOTE — CARE PLAN
The patient's goals for the shift include Patient will report less anxiety and/or dizziness by the end of shift.    The clinical goals for the shift include Patient will remain free from injury throughout shift    Pt slept for at least 4 hours throughout shift. Continues to be pleasant. Remains on continuous EEG monitoring. It is noted that she becomes tachycardiac while ambulating and appears unaware. C/o b/l feet pain this morning. Had some difficulty ambulating to rest room. Administered tylenol and gave ice packs for comfort.     Problem: Fall/Injury  Goal: Not fall by end of shift  Outcome: Progressing  Goal: Be free from injury by end of the shift  Outcome: Progressing  Goal: Verbalize understanding of personal risk factors for fall in the hospital  Outcome: Progressing  Goal: Verbalize understanding of risk factor reduction measures to prevent injury from fall in the home  Outcome: Progressing  Goal: Use assistive devices by end of the shift  Outcome: Progressing  Goal: Pace activities to prevent fatigue by end of the shift  Outcome: Progressing     Problem: Pain - Adult  Goal: Verbalizes/displays adequate comfort level or baseline comfort level  Outcome: Progressing     Problem: Safety - Adult  Goal: Free from fall injury  Outcome: Progressing     Problem: Discharge Planning  Goal: Discharge to home or other facility with appropriate resources  Outcome: Progressing     Problem: Chronic Conditions and Co-morbidities  Goal: Patient's chronic conditions and co-morbidity symptoms are monitored and maintained or improved  Outcome: Progressing

## 2024-04-02 NOTE — CARE PLAN
Problem: Fall/Injury  Goal: Not fall by end of shift  Outcome: Progressing  Goal: Be free from injury by end of the shift  Outcome: Progressing  Goal: Verbalize understanding of personal risk factors for fall in the hospital  Outcome: Progressing  Goal: Verbalize understanding of risk factor reduction measures to prevent injury from fall in the home  Outcome: Progressing  Goal: Use assistive devices by end of the shift  Outcome: Progressing  Goal: Pace activities to prevent fatigue by end of the shift  Outcome: Progressing     Problem: Pain - Adult  Goal: Verbalizes/displays adequate comfort level or baseline comfort level  Outcome: Progressing     Problem: Safety - Adult  Goal: Free from fall injury  Outcome: Progressing     Problem: Discharge Planning  Goal: Discharge to home or other facility with appropriate resources  Outcome: Progressing     Problem: Chronic Conditions and Co-morbidities  Goal: Patient's chronic conditions and co-morbidity symptoms are monitored and maintained or improved  Outcome: Progressing   The patient's goals for the shift include Patient will report less anxiety and/or dizziness by the end of shift.    The clinical goals for the shift include Patient will remain free from injury throughout shift    Over the shift, the patient did not make progress toward the following goals. Barriers to progression include none at the moment. Recommendations to address these barriers include n/a.
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
## 2024-04-02 NOTE — PROGRESS NOTES
Care Transitions Progress Note:  Plan per medical team: workup of staring spells; confusion; Psych consult  Team Members Present: NP: MD: LINDSAY: MACARENA  Dispo: home  Statusinpatient   Payor:Autumn  Barriers:none  Adod: today

## 2024-04-02 NOTE — PROGRESS NOTES
Sherin Mckinnon is a 38 y.o. female on day 3 of admission presenting with selective forgetfulness and staring spells.    Subjective   No acute event overnight, patient feeling better, no seizure-like activity since admission.  This morning shared some concern that she might get a seizure she feels a little off no clear aura description.  Reported poor memory and concerned about early dementia.     Objective   Current Vitals  /87 (BP Location: Left arm, Patient Position: Lying)   Pulse 84   Temp 35.5 °C (95.9 °F) (Temporal)   Resp 16   Wt 70.1 kg (154 lb 8.7 oz)   SpO2 98%   BMI 26.53 kg/m²      I/O last 3 completed shifts:  In: 720 (10.3 mL/kg) [P.O.:720]  Out: - (0 mL/kg)   Weight: 70.1 kg     Physical Exam  Constitutional:       Appearance: Normal appearance. She is normal weight.   HENT:      Head: Normocephalic and atraumatic.      Nose: Nose normal.   Eyes:      Extraocular Movements: Extraocular movements intact.      Pupils: Pupils are equal, round, and reactive to light.   Cardiovascular:      Rate and Rhythm: Normal rate and regular rhythm.   Pulmonary:      Effort: Pulmonary effort is normal.   Abdominal:      General: Abdomen is flat.      Palpations: Abdomen is soft.   Musculoskeletal:         General: Normal range of motion.   Neurological:      General: No focal deficit present.      Mental Status: She is alert and oriented to person, place, and time. Mental status is at baseline.      Cranial Nerves: Cranial nerves 2-12 are intact.      Sensory: Sensation is intact.      Motor: Motor function is intact.      Coordination: Coordination is intact. Coordination normal.      Gait: Gait is intact.      Deep Tendon Reflexes: Babinski sign absent on the right side. Babinski sign absent on the left side.      Reflex Scores:       Tricep reflexes are 2+ on the right side and 2+ on the left side.       Bicep reflexes are 2+ on the right side and 2+ on the left side.       Brachioradialis reflexes are  2+ on the right side and 2+ on the left side.       Patellar reflexes are 0 on the right side and 0 on the left side.       Achilles reflexes are 0 on the right side and 0 on the left side.  Psychiatric:         Mood and Affect: Mood normal.     Relevant Results    Labs:  Most recent 03/30/2024  CBC: WBC 7.0 , HGB 13.7,   BMP: , K 3.8, Cl 101, HCO3 20, BUN 14, CR 0.52, Glu 107  LFTS: AST 89 , ALT 19, ALKPHOS 64 , TBILI 0.4 , DBILI 0.0  TROP: 7  BNP: 168    UA:   Results from last 7 days   Lab Units 03/29/24  2306   COLOR U  Light-Yellow   PH U  7.0   SPEC GRAV UR  >1.050*   PROTEIN U mg/dL 300 (3+)*   BLOOD UR  0.1 (1+)*   NITRITE U  NEGATIVE   WBC UR /HPF 1-5   BACTERIA UR /HPF 1+*       ABG:    CALCIUM 9.9 MAG No results found for requested labs within last 365 days. ALB 4.6 LACTATE 0.7 PHOS No results found for requested labs within last 365 days. COVIDNo results found for requested labs within last 365 days.    Micro/culture data:  No results found for the last 90 days.    Imaging:  EEG  IMPRESSION    This EEG is indicative of a mild diffuse encephalopathy. No epileptiform discharges or lateralizing signs are seen.    A full report will be scanned into the patient's chart at a later time.    This report has been interpreted and electronically signed by    ECG 12 lead    Result Date: 4/1/2024  Normal sinus rhythm Normal ECG When compared with ECG of 30-NOV-2022 21:56, Previous ECG has undetermined rhythm, needs review    EEG    IMPRESSION   This EEG is normal. No epileptiform discharges or lateralizing signs are seen. A full report will be scanned into the patient's chart at a later time. This report has been interpreted and electronically signed by    MR brain wo IV contrast    Result Date: 3/30/2024  FINDINGS: Brain:   CSF Spaces: The ventricles and sulci are normal, unchanged.   Parenchyma: No acute infarct is noted on the diffusion images.   The FLAIR images demonstrate focal white matter  hyperintensities with patchy area of increased intensity in the periatrial region. There is questionable increased signal along the medial aspect of the left temporal lobe and insula bilaterally; this may be related to motion artifact however.   No mass or hemorrhage is noted.   Paranasal Sinuses and Mastoids: Visualized paranasal sinuses and mastoid air cells are unremarkable.   MRA of head:   The images are degraded by motion artifact.   Anterior circulation:  The ophthalmic segments of the internal carotid arteries more so on the right have signal loss most likely from artifact. No other stenoses of the anterior cerebral circulation are noted.   Posterior circulation:    Bilateral intracranial vertebral arteries, vertebrobasilar junction, basilar artery and proximal posterior cerebral arteries demonstrate expected flow signal.   MRA of neck:   The source images are mildly degraded by artifact.   Right carotid vessels:  There is expected flow signal in the visualized portion of the common carotid artery.  There is mild attenuation of flow signal at the carotid bifurcation which may be secondary to flow related artifact. The internal carotid artery in the neck demonstrates expected flow signal.  There is 0% stenosis  .   Left carotid vessels:   There is expected flow signal in the visualized portion of the common carotid artery.  There is mild attenuation of flow signal at the carotid bifurcation which may be secondary to flow related artifact. The internal carotid artery in the neck demonstrates expected flow signal.  There is 0% stenosis  .   Vertebral vessels:   The visualized segments of the cervical vertebral arteries demonstrate expected flow signal.       MRI Brain:  1. The images are degraded by motion artifact, especially the thin section T1 and FLAIR images.   2. No acute infarct, hemorrhage or mass is noted.   3. The white matter has nonspecific FLAIR hyperintensity with questionable hyperintensity along  the medial aspect of the left temporal lobe. Recommend repeat MR with contrast when the patient is better able to tolerate imaging.   MRA:   1. The cerebral MR angiogram is degraded by motion artifact with signal loss in the ophthalmic segments of the internal carotid arteries most likely related to motion. Underlying stenosis in this region cannot be excluded. The remaining intra cerebral circulation is normal.   2. No cervical carotid/vertebral stenosis is noted.   MACRO: None   Signed by: Kenyon Arciniega 3/30/2024 6:09 PM Dictation workstation:   [id number]    MR angio head wo IV contrast    Result Date: 3/30/2024  FINDINGS: Brain: CSF Spaces: The ventricles and sulci are normal, unchanged.   Parenchyma: No acute infarct is noted on the diffusion images.   The FLAIR images demonstrate focal white matter hyperintensities with patchy area of increased intensity in the periatrial region. There is questionable increased signal along the medial aspect of the left temporal lobe and insula bilaterally; this may be related to motion artifact however.   No mass or hemorrhage is noted.   Paranasal Sinuses and Mastoids: Visualized paranasal sinuses and mastoid air cells are unremarkable.   MRA of head:   The images are degraded by motion artifact.   Anterior circulation:  The ophthalmic segments of the internal carotid arteries more so on the right have signal loss most likely from artifact. No other stenoses of the anterior cerebral circulation are noted.   Posterior circulation:    Bilateral intracranial vertebral arteries, vertebrobasilar junction, basilar artery and proximal posterior cerebral arteries demonstrate expected flow signal.   MRA of neck:   The source images are mildly degraded by artifact.   Right carotid vessels:  There is expected flow signal in the visualized portion of the common carotid artery.  There is mild attenuation of flow signal at the carotid bifurcation which may be secondary to flow related  artifact. The internal carotid artery in the neck demonstrates expected flow signal.  There is 0% stenosis  .   Left carotid vessels:   There is expected flow signal in the visualized portion of the common carotid artery.  There is mild attenuation of flow signal at the carotid bifurcation which may be secondary to flow related artifact. The internal carotid artery in the neck demonstrates expected flow signal.  There is 0% stenosis  .   Vertebral vessels:   The visualized segments of the cervical vertebral arteries demonstrate expected flow signal.       MRI Brain:   1. The images are degraded by motion artifact, especially the thin section T1 and FLAIR images.   2. No acute infarct, hemorrhage or mass is noted.   3. The white matter has nonspecific FLAIR hyperintensity with questionable hyperintensity along the medial aspect of the left temporal lobe. Recommend repeat MR with contrast when the patient is better able to tolerate imaging.   MRA:   1. The cerebral MR angiogram is degraded by motion artifact with signal loss in the ophthalmic segments of the internal carotid arteries most likely related to motion. Underlying stenosis in this region cannot be excluded. The remaining intra cerebral circulation is normal.   2. No cervical carotid/vertebral stenosis is noted.   MACRO: None   Signed by: Kenyon Arciniega 3/30/2024 6:09 PM Dictation workstation:   [id number]    MR angio neck wo IV contrast    Result Date: 3/30/2024  FINDINGS: Brain:   CSF Spaces: The ventricles and sulci are normal, unchanged.   Parenchyma: No acute infarct is noted on the diffusion images.   The FLAIR images demonstrate focal white matter hyperintensities with patchy area of increased intensity in the periatrial region. There is questionable increased signal along the medial aspect of the left temporal lobe and insula bilaterally; this may be related to motion artifact however. No mass or hemorrhage is noted.   Paranasal Sinuses and Mastoids:  Visualized paranasal sinuses and mastoid air cells are unremarkable.   MRA of head:   The images are degraded by motion artifact.   Anterior circulation:  The ophthalmic segments of the internal carotid arteries more so on the right have signal loss most likely from artifact. No other stenoses of the anterior cerebral circulation are noted.   Posterior circulation:    Bilateral intracranial vertebral arteries, vertebrobasilar junction, basilar artery and proximal posterior cerebral arteries demonstrate expected flow signal.   MRA of neck:   The source images are mildly degraded by artifact. Right carotid vessels:  There is expected flow signal in the visualized portion of the common carotid artery.  There is mild attenuation of flow signal at the carotid bifurcation which may be secondary to flow related artifact. The internal carotid artery in the neck demonstrates expected flow signal.  There is 0% stenosis. Left carotid vessels:   There is expected flow signal in the visualized portion of the common carotid artery.  There is mild attenuation of flow signal at the carotid bifurcation which may be secondary to flow related artifact. The internal carotid artery in the neck demonstrates expected flow signal.  There is 0% stenosis  .   Vertebral vessels:   The visualized segments of the cervical vertebral arteries demonstrate expected flow signal.       MRI Brain:  1. The images are degraded by motion artifact, especially the thin section T1 and FLAIR images.  2. No acute infarct, hemorrhage or mass is noted.   3. The white matter has nonspecific FLAIR hyperintensity with questionable hyperintensity along the medial aspect of the left temporal lobe. Recommend repeat MR with contrast when the patient is better able to tolerate imaging.   MRA:   1. The cerebral MR angiogram is degraded by motion artifact with signal loss in the ophthalmic segments of the internal carotid arteries most likely related to motion.  Underlying stenosis in this region cannot be excluded. The remaining intra cerebral circulation is normal.   2. No cervical carotid/vertebral stenosis is noted.     CT head wo IV contrast    Result Date: 3/29/2024  FINDINGS: BRAIN: No acute intracranial hemorrhage. No mass effect or midline shift. Gray-white matter interfaces are preserved. VENTRICLES and EXTRA-AXIAL SPACES: Normal. EXTRACRANIAL SOFT TISSUES:  Within normal limits. PARANASAL SINUSES/MASTOIDS: The visualized paranasal sinuses and mastoid air cells are aerated. BONES AND ORBITS: No displaced skull fracture. No suspicious osseous lesion.  Orbits are within normal limits. OTHER FINDINGS: None.       1. No acute intracranial hemorrhage or mass effect.    CT abdomen pelvis w IV contrast    Result Date: 3/29/2024  FINDINGS: LOWER CHEST: Minimal subsegmental atelectasis/scarring. LIVER: Within normal limits. BILE DUCTS: Normal caliber. GALLBLADDER: No calcified stones. No wall thickening. PANCREAS: Within normal limits. SPLEEN: Within normal limits. ADRENALS: Within normal limits. KIDNEYS, URETERS, and BLADDER: No hydronephrosis or renal calculi. Ureters are non-dilated.  Urinary bladder within normal limits. REPRODUCTIVE: Intrauterine device in place. VESSELS: The aorta and IVC appear normal. RETROPERITONEUM and LYMPH NODES: No lymphadenopathy. BOWEL: The stomach is unremarkable. Small bowel is non-dilated. Normal appendix. Mild colonic diverticulosis without evidence of diverticulitis. PERITONEUM: No ascites or free air, no fluid collection. BODY WALL: Within normal limits. MUSCULOSKELETAL: No acute osseous abnormality or suspicious osseous lesions. Mild chronic appearing height loss/Schmorl's nodes of lower thoracic superior endplates from T7-T11.       1. No acute abdominal or pelvic process. 2. Colonic diverticulosis without evidence of diverticulitis. 3. Mild chronic appearing height loss/Schmorl's nodes of lower thoracic superior endplates from  "T7-T11.     XR chest 1 view    Result Date: 3/29/2024  FINDINGS: The cardiac silhouette appears prominent.   There is no consolidation or pleural fluid.   There is slight elevation of the right hemidiaphragm.   The mediastinum and bones are unremarkable.    COMPARISON OF FINDING:     No acute cardiopulmonary disease.   XR ankle left 3+ views    Result Date: 3/14/2024  RESULT: Postoperative changes of talus replacement.  Hardware appears to be intact and in good alignment.  Ankle mortise is congruent.  No acute fracture or dislocation.  Joint spaces are maintained. 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    IMPRESSION: Left ankle talar replacement, unchanged. : WESLY       MEDS:  Scheduled medications  aspirin, 81 mg, oral, Daily  atorvastatin, 40 mg, oral, Nightly  budesonide, 0.5 mg, nebulization, BID   And  formoterol, 20 mcg, nebulization, BID  enoxaparin, 40 mg, subcutaneous, q24h  eucerin, , Topical, BID  fluticasone, 1 spray, Each Nostril, Daily  [Held by provider] gabapentin, 300 mg, oral, BID  insulin lispro, 0-5 Units, subcutaneous, TID with meals  melatonin, 5 mg, oral, Nightly  polyethylene glycol, 17 g, oral, Daily  upadacitinib ER, 15 mg, oral, Daily  upadacitinib ER, 15 mg, oral, Daily    Continuous medications     PRN medications  PRN medications: acetaminophen **OR** [DISCONTINUED] acetaminophen, acetaminophen **OR** [DISCONTINUED] acetaminophen **OR** acetaminophen, albuterol, albuterol, dextrose, dextrose, glucagon, glucagon, prochlorperazine, tacrolimus     Assessment/Plan:   Carie PAREDES Anthonymalathi \"Susan" is a 38 y.o. female with PMH of celiac disease, atopic dermatitis c/b steroid withdrawal on Dupixant, PTSD, anxiety, and asthma p/w 2-3 week history of confusion. She has previously been seen at [facility] for seizures described as dissociative episodes lasting 30-60 seconds occurring in clusters. EEG's from 2018 have been normal. Normal exam, " however unable to provide consistent history but is AAO4, and has no deficits, although her lower extremities are hypo-reflexic. Her selective memory complaint could be consistent with disassociative amnesia. However given Hx of staring spells and episodes of generalized shaking seizure is still in the differential.    4-D Classification of Paroxysmal Event  Semiology 1: Psychic Aura Event -> Dialepsis Event -> Generalized Motor Event  Onset: 2016 (while taking cyclosporine for a rash)  Frequency: 2x per month  Last seizure: 3/30  Etiology: Unknown  Comorbidities: PTSD, Anxiety     Prior AEDs: OXC (Adverse effects, not specified), LEV (nausea, vomiting, weight loss), LCM (dizziness)  Current AEDs: Gabapentin, 300 mg BID      Updates 4/2:  -Discharge home  - Patient continued to feel better   - Send for TSH FT4 and B12 level  - Follow-up with dermatology  - Follow-up with neuropsychology for memory evaluation.  - Outpatient follow-up with epilepsy clinic.  - Outpatient follow-up with psychiatry  - Medication reconciliation was completed patient was not taking aspirin at home.    Pain medications: PRN Tylenol Gabapentin  Fluids: Replete PRN  Electrolytes: Keep mg >2, phos >3  and K >4  Nutrition:  Adult diet Gluten Free   Antimicrobials: None  DVT PPX: Lovenox  GI ppx: none needed  Bowel care: Miralax  Catheter: None  Lines: PIV  Oxygen: Room Air    Disposition:   Home    Code Status: Full Code (confirmed on admission)   NOK:  Primary Emergency Contact: LILY WISE, Home Phone: [phone]    Radha Bassett MD

## 2024-04-02 NOTE — DISCHARGE SUMMARY
"Discharge Diagnosis  Confusion    Issues Requiring Follow-Up  -Follow-up with psychiatry.  -Follow-up with epilepsy.  -Follow-up with dermatology.  -Follow-up with neuropsychology for memory evaluation.  Test Results Pending At Discharge  Pending Labs       No current pending labs.            Hospital Course  Carie Mckinnon \"Susan" is a 38 y.o. female with PMH of celiac disease, atopic dermatitis c/b steroid withdrawal on Dupixant, PTSD, anxiety, and asthma p/w 2-3 week history of confusion. She has previously been seen at [facility] for seizures described as dissociative episodes lasting 30-60 seconds occurring in clusters. EEG's from 2018 have been normal. Normal exam A&O4 however unable to provide consistent history. Father describes previous event as seizure-like staring spell, but describes her mouth pulling alternately to either side in what sounds rather atypical for epileptic seizure. The description of the event sounds a bit more like PNES, and her parents do not think she is having epileptic seizure. Patient reported selective memory loss (such as her employment, previous surgery) and at times she forgets her name concerning for dissociative amnesia and arguing against transient global amnesia.  During inpatient continuous video EEG no epileptiform discharges, MRI brain showed nonspecific white matter lesion otherwise unremarkable. Dermatology was consulted recommended Eucerin cream and tacrolimus ointment on affected areas.  Psychiatry was consulted recommended outpatient follow-up.  Patient was discharged home with plan to follow-up with dermatology, psychiatry, and neuropsychology for memory evaluation, epilepsy.    4-D Classification of Paroxysmal Event  Semiology 1: Psychic Aura Event -> Dialepsis Event -> Generalized Motor Event  Onset: 2016 (while taking cyclosporine for a rash)  Frequency: 2x per month  Last seizure: 3/30  Etiology: Unknown  Comorbidities: PTSD, Anxiety     Prior AEDs: OXC (Adverse " effects, not specified), LEV (nausea, vomiting, weight loss), LCM (dizziness)  Current AEDs: Gabapentin, 300 mg BID      Pertinent Physical Exam At Time of Discharge  Physical Exam  Constitutional:       Appearance: Normal appearance. She is normal weight.   HENT:      Head: Normocephalic and atraumatic.      Nose: Nose normal.   Eyes:      Extraocular Movements: Extraocular movements intact.      Pupils: Pupils are equal, round, and reactive to light.   Cardiovascular:      Rate and Rhythm: Normal rate and regular rhythm.   Pulmonary:      Effort: Pulmonary effort is normal.   Abdominal:      General: Abdomen is flat.      Palpations: Abdomen is soft.   Musculoskeletal:         General: Normal range of motion.   Neurological:      General: No focal deficit present.      Mental Status: She is alert and oriented to person, place, and time. Mental status is at baseline.      Cranial Nerves: Cranial nerves 2-12 are intact.      Sensory: Sensation is intact.      Motor: Motor function is intact.      Coordination: Coordination is intact. Coordination normal.      Gait: Gait is intact.      Deep Tendon Reflexes: Babinski sign absent on the right side. Babinski sign absent on the left side.      Reflex Scores:       Tricep reflexes are 2+ on the right side and 2+ on the left side.       Bicep reflexes are 2+ on the right side and 2+ on the left side.       Brachioradialis reflexes are 2+ on the right side and 2+ on the left side.       Patellar reflexes are 0 on the right side and 0 on the left side.       Achilles reflexes are 0 on the right side and 0 on the left side.  Psychiatric:         Mood and Affect: Mood normal.     Home Medications     Medication List      START taking these medications     eucerin cream; Eucerin cream twice daily head to toe as barrier cream   for 30 days   tacrolimus 0.1 % ointment; Commonly known as: Protopic; START tacrolimus   0.1% ointment (provide with 30g tube at bedside) as spot  treatment twice   daily to symptomatic areas     CONTINUE taking these medications     albuterol 90 mcg/actuation inhaler   amLODIPine 5 mg tablet; Commonly known as: Norvasc; Take 1 tablet (5 mg)   by mouth once daily.   fluticasone 50 mcg/actuation nasal spray; Commonly known as: Flonase;   Administer 1 spray into each nostril once daily.   fluticasone propion-salmeteroL 100-50 mcg/dose diskus inhaler; Commonly   known as: Advair Diskus; Inhale 1 puff 2 times a day. Rinse mouth with   water after use to reduce aftertaste and incidence of candidiasis. Do not   swallow.   gabapentin 300 mg capsule; Commonly known as: Neurontin; take 1 capsule   by mouth twice a day   Rinvoq 15 mg tablet extended release 24 hr; Generic drug: upadacitinib   ER; TAKE 1 TABLET BY MOUTH 1 TIME A DAY.       Outpatient Follow-Up  Future Appointments   Date Time Provider Department Center   10/14/2024  2:00 PM Erinn Vargas MD [id number]       Radha Bassett MD

## 2024-04-02 NOTE — NURSING NOTE
4/2/24 1527 Nursing Discharge Note  Patient educated on discharge paperwork. Paperwork explain and all questions were answered. Patients belongings sent home with patient. IV removed per protocol. Patient discharged home with parents.     Manjula Gutierrez RN

## 2024-04-03 ENCOUNTER — DOCUMENTATION (OUTPATIENT)
Dept: PRIMARY CARE | Facility: CLINIC | Age: 39
End: 2024-04-03
Payer: COMMERCIAL

## 2024-04-03 NOTE — PROGRESS NOTES
Further investigation into patient chart shows she does not need a follow up with PCP. TCM program closed

## 2024-04-04 NOTE — HOSPITAL COURSE
"Carie Mckinnon \"Susan" is a 38 y.o. female with PMH of celiac disease, atopic dermatitis c/b steroid withdrawal on Dupixant, PTSD, anxiety, and asthma p/w 2-3 week history of confusion. She has previously been seen at [facility] for seizures described as dissociative episodes lasting 30-60 seconds occurring in clusters. EEG's from 2018 have been normal. Normal exam A&O4 however unable to provide consistent history. Father describes previous event as seizure-like staring spell, but describes her mouth pulling alternately to either side in what sounds rather atypical for epileptic seizure. The description of the event sounds a bit more like PNES, and her parents do not think she is having epileptic seizure. Patient reported selective memory loss (such as her employment, previous surgery) and at times she forgets her name concerning for dissociative amnesia and arguing against transient global amnesia.  During inpatient continuous video EEG no epileptiform discharges.  Dermatology was consulted recommended Eucerin cream and tacrolimus ointment on affected areas.  Psychiatry was consulted recommended outpatient follow-up.  Patient was discharged home with plan to follow-up with dermatology, psychiatry, and neuropsychology for memory evaluation, epilepsy.    4-D Classification of Paroxysmal Event  Semiology 1: Psychic Aura Event -> Dialepsis Event -> Generalized Motor Event  Onset: 2016 (while taking cyclosporine for a rash)  Frequency: 2x per month  Last seizure: 3/30  Etiology: Unknown  Comorbidities: PTSD, Anxiety     Prior AEDs: OXC (Adverse effects, not specified), LEV (nausea, vomiting, weight loss), LCM (dizziness)  Current AEDs: Gabapentin, 300 mg BID    "

## 2024-04-17 DIAGNOSIS — L30.9 DERMATITIS, UNSPECIFIED: ICD-10-CM

## 2024-04-18 ENCOUNTER — TELEMEDICINE (OUTPATIENT)
Dept: BEHAVIORAL HEALTH | Facility: CLINIC | Age: 39
End: 2024-04-18
Payer: COMMERCIAL

## 2024-04-18 DIAGNOSIS — R56.9 SEIZURE-LIKE ACTIVITY (MULTI): ICD-10-CM

## 2024-04-18 DIAGNOSIS — F41.1 GAD (GENERALIZED ANXIETY DISORDER): Primary | ICD-10-CM

## 2024-04-18 PROCEDURE — 1036F TOBACCO NON-USER: CPT

## 2024-04-18 PROCEDURE — 90792 PSYCH DIAG EVAL W/MED SRVCS: CPT

## 2024-04-18 RX ORDER — ESCITALOPRAM OXALATE 5 MG/1
5 TABLET ORAL DAILY
Qty: 30 TABLET | Refills: 0 | Status: SHIPPED | OUTPATIENT
Start: 2024-04-18 | End: 2024-05-07 | Stop reason: SDUPTHER

## 2024-04-18 ASSESSMENT — ANXIETY QUESTIONNAIRES
3. WORRYING TOO MUCH ABOUT DIFFERENT THINGS: NEARLY EVERY DAY
GAD7 TOTAL SCORE: 13
5. BEING SO RESTLESS THAT IT IS HARD TO SIT STILL: NEARLY EVERY DAY
4. TROUBLE RELAXING: MORE THAN HALF THE DAYS
2. NOT BEING ABLE TO STOP OR CONTROL WORRYING: NEARLY EVERY DAY
6. BECOMING EASILY ANNOYED OR IRRITABLE: NOT AT ALL
1. FEELING NERVOUS, ANXIOUS, OR ON EDGE: MORE THAN HALF THE DAYS
IF YOU CHECKED OFF ANY PROBLEMS ON THIS QUESTIONNAIRE, HOW DIFFICULT HAVE THESE PROBLEMS MADE IT FOR YOU TO DO YOUR WORK, TAKE CARE OF THINGS AT HOME, OR GET ALONG WITH OTHER PEOPLE: VERY DIFFICULT
7. FEELING AFRAID AS IF SOMETHING AWFUL MIGHT HAPPEN: NOT AT ALL

## 2024-04-18 ASSESSMENT — PATIENT HEALTH QUESTIONNAIRE - PHQ9
5. POOR APPETITE OR OVEREATING: NOT AT ALL
6. FEELING BAD ABOUT YOURSELF - OR THAT YOU ARE A FAILURE OR HAVE LET YOURSELF OR YOUR FAMILY DOWN: NEARLY EVERY DAY
1. LITTLE INTEREST OR PLEASURE IN DOING THINGS: NEARLY EVERY DAY
2. FEELING DOWN, DEPRESSED OR HOPELESS: NOT AT ALL
SUM OF ALL RESPONSES TO PHQ9 QUESTIONS 1 & 2: 3
4. FEELING TIRED OR HAVING LITTLE ENERGY: NOT AT ALL
3. TROUBLE FALLING OR STAYING ASLEEP: NEARLY EVERY DAY
SUM OF ALL RESPONSES TO PHQ QUESTIONS 1-9: 9
7. TROUBLE CONCENTRATING ON THINGS, SUCH AS READING THE NEWSPAPER OR WATCHING TELEVISION: NOT AT ALL
10. IF YOU CHECKED OFF ANY PROBLEMS, HOW DIFFICULT HAVE THESE PROBLEMS MADE IT FOR YOU TO DO YOUR WORK, TAKE CARE OF THINGS AT HOME, OR GET ALONG WITH OTHER PEOPLE: SOMEWHAT DIFFICULT
9. THOUGHTS THAT YOU WOULD BE BETTER OFF DEAD, OR OF HURTING YOURSELF: NOT AT ALL
8. MOVING OR SPEAKING SO SLOWLY THAT OTHER PEOPLE COULD HAVE NOTICED. OR THE OPPOSITE, BEING SO FIGETY OR RESTLESS THAT YOU HAVE BEEN MOVING AROUND A LOT MORE THAN USUAL: NOT AT ALL

## 2024-04-18 ASSESSMENT — ENCOUNTER SYMPTOMS
CONSTITUTIONAL NEGATIVE: 1
NEUROLOGICAL NEGATIVE: 1
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
## 2024-04-19 RX ORDER — TACROLIMUS 1 MG/G
OINTMENT TOPICAL
Qty: 30 G | Refills: 6 | Status: SHIPPED | OUTPATIENT
Start: 2024-04-19

## 2024-04-19 ASSESSMENT — ENCOUNTER SYMPTOMS: NERVOUS/ANXIOUS: 1

## 2024-05-03 ENCOUNTER — APPOINTMENT (OUTPATIENT)
Dept: BEHAVIORAL HEALTH | Facility: CLINIC | Age: 39
End: 2024-05-03
Payer: COMMERCIAL

## 2024-05-07 ENCOUNTER — TELEPHONE (OUTPATIENT)
Dept: BEHAVIORAL HEALTH | Facility: CLINIC | Age: 39
End: 2024-05-07
Payer: COMMERCIAL

## 2024-05-07 DIAGNOSIS — F41.1 GAD (GENERALIZED ANXIETY DISORDER): ICD-10-CM

## 2024-05-07 RX ORDER — ESCITALOPRAM OXALATE 5 MG/1
10 TABLET ORAL DAILY
Qty: 60 TABLET | Refills: 0 | Status: SHIPPED | OUTPATIENT
Start: 2024-05-07 | End: 2024-05-14

## 2024-05-07 NOTE — PROGRESS NOTES
Provider called patient and gave instructions  to take Lexapro 10 mg daily for current symptoms. Patient was on Lexapro 5 mg before and the dose only helped for depression. Patient reported still having anxiety and depression was managed.

## 2024-05-07 NOTE — PROGRESS NOTES
Patient contacted provider suggesting Depressive symptoms are managed on current Lexapro dose, but the anxiety is still very high. Patient was taking Lexapro 5 mg tablet daily to manage symptoms. Provider gave order to take 2 Lexapro pills a day for a total of 10 mg dose daily. Follow up is scheduled for next week.

## 2024-05-13 ENCOUNTER — TELEPHONE (OUTPATIENT)
Dept: PRIMARY CARE | Facility: CLINIC | Age: 39
End: 2024-05-13
Payer: COMMERCIAL

## 2024-05-13 NOTE — TELEPHONE ENCOUNTER
Pt left a msg stating that she scheduled an appt for 6/7, and needs a refill of her Amlodipine .  Pharm is [facility].

## 2024-05-14 ENCOUNTER — TELEMEDICINE (OUTPATIENT)
Dept: BEHAVIORAL HEALTH | Facility: CLINIC | Age: 39
End: 2024-05-14
Payer: COMMERCIAL

## 2024-05-14 DIAGNOSIS — F41.1 GAD (GENERALIZED ANXIETY DISORDER): Primary | ICD-10-CM

## 2024-05-14 PROCEDURE — 1036F TOBACCO NON-USER: CPT

## 2024-05-14 PROCEDURE — 99214 OFFICE O/P EST MOD 30 MIN: CPT

## 2024-05-14 RX ORDER — ESCITALOPRAM OXALATE 10 MG/1
10 TABLET ORAL DAILY
Qty: 90 TABLET | Refills: 0 | Status: SHIPPED | OUTPATIENT
Start: 2024-05-14 | End: 2024-08-12

## 2024-05-14 ASSESSMENT — ENCOUNTER SYMPTOMS
NERVOUS/ANXIOUS: 1
CONSTITUTIONAL NEGATIVE: 1

## 2024-05-14 ASSESSMENT — ANXIETY QUESTIONNAIRES
5. BEING SO RESTLESS THAT IT IS HARD TO SIT STILL: SEVERAL DAYS
IF YOU CHECKED OFF ANY PROBLEMS ON THIS QUESTIONNAIRE, HOW DIFFICULT HAVE THESE PROBLEMS MADE IT FOR YOU TO DO YOUR WORK, TAKE CARE OF THINGS AT HOME, OR GET ALONG WITH OTHER PEOPLE: SOMEWHAT DIFFICULT
6. BECOMING EASILY ANNOYED OR IRRITABLE: NOT AT ALL
GAD7 TOTAL SCORE: 5
7. FEELING AFRAID AS IF SOMETHING AWFUL MIGHT HAPPEN: SEVERAL DAYS
4. TROUBLE RELAXING: NOT AT ALL
2. NOT BEING ABLE TO STOP OR CONTROL WORRYING: NOT AT ALL
1. FEELING NERVOUS, ANXIOUS, OR ON EDGE: NEARLY EVERY DAY
3. WORRYING TOO MUCH ABOUT DIFFERENT THINGS: NOT AT ALL

## 2024-05-14 NOTE — PROGRESS NOTES
"  Virtual or Telephone Consent    An interactive audio and video telecommunication system which permits real time communications between the patient (at the originating site) and provider (at the distant site) was utilized to provide this telehealth service.   Verbal consent was requested and obtained from Carie Mckinnon on this date, 05/14/24 for a telehealth visit.       Assessment/Plan     Impression:  Carie Mckinnon \"Susan" is a 38 y.o. female who presents via Telehealth for a follow up visit with CC of \" [facility]   Anxiety and Med Management. Patient consents to treatment.    Problem List Items Addressed This Visit             ICD-10-CM    MANOJ (generalized anxiety disorder) - Primary F41.1    Relevant Medications    escitalopram (Lexapro) 10 mg tablet       Patient is reminded that if there is SI to call [phone] and get themselves to the closest ED for evaluation, otherwise contact me for other questions/concerns.      Carie Mckinnon \"Susan" is a 38 y.o. female patient with a chief complaint of   Chief Complaint   Patient presents with    Anxiety    Med Management     Patient presenting to outpatient treatment for a scheduled psych follow up visit.      HPI   Background:     Patient was seen a couple of weeks ago for management of anxiety symptoms. She was started on Lexapro 5 mg which was later increased to Lexapro 10 mg. However, Patient was unable to  the increased dose of Lexapro due to Pharmacy never notifying her of the new prescription.   Patient reports Lexapro has not helped with alleviating the current symptoms. Patient hopes to manage the anxiety to feel better. Reports she was able to schedule a surgery for July  which was one of the factor triggering her anxiety in the past couple of weeks.   Characteristics/Recent psychiatric symptoms (pertinent positives and negatives):    Patient reports she is still having symptoms of restlessness and occasionally irritability. Patient reports anxiety " "feels overwhelming at times. Reports sleep has improved getting 6-8 hours a night.  Patient ranks the severity of anxiety using the MANOJ 7 scale with a rating of 5 for  anxiety symptoms. Patient denies  depressive symptoms. Patient does explain that current dose of Lexapro 5 mg  has not been successful in the management of anxiety and depressive symptoms.     NOTE: Symptom scale is rated where 0 = no symptoms at all, and 10 = symptoms so severe that pt is an imminent danger to themselves or others and requires hospitalization.    Anxiety remain(s) present more days than not, which has remained unchanged over the past few weeks. Carie Mckinnon rates the severity of psych symptoms as a 7/10, noting symptom improvement with nothing and worsening of symptoms with nothing.    Aggravating and/or relieving factors/triggers :    Aggravating factors of  \" I have anything to attribute it too. \"  Relieving factors of  \"nothing\"    -SI/HI : Denies    Psychiatric Review Of Systems:  Depressive Symptoms: negative  Manic Symptoms: negative  Anxiety Symptoms: General Anxiety Disorder (MANOJ)MANOJ Behaviors: excessive anxiety/worry and restlessness  Psychotic Symptoms: negative      Current Medications:    Current Outpatient Medications:     albuterol 90 mcg/actuation inhaler, Inhale 1 puff every 6 hours if needed., Disp: , Rfl:     amLODIPine (Norvasc) 5 mg tablet, Take 1 tablet (5 mg) by mouth once daily., Disp: 30 tablet, Rfl: 5    eucerin cream, Eucerin cream twice daily head to toe as barrier cream for 30 days, Disp: 453 g, Rfl: 0    fluticasone (Flonase) 50 mcg/actuation nasal spray, Administer 1 spray into each nostril once daily., Disp: 16 g, Rfl: 0    gabapentin (Neurontin) 300 mg capsule, take 1 capsule by mouth twice a day, Disp: 60 capsule, Rfl: 6    Rinvoq 15 mg tablet extended release 24 hr, TAKE 1 TABLET BY MOUTH 1 TIME A DAY., Disp: 30 tablet, Rfl: 11    tacrolimus (Protopic) 0.1 % ointment, APPLY TWICE DAILY topically " to the areas OF eczema, Disp: 30 g, Rfl: 6    escitalopram (Lexapro) 10 mg tablet, Take 1 tablet (10 mg) by mouth once daily., Disp: 90 tablet, Rfl: 0    fluticasone propion-salmeteroL (Advair Diskus) 100-50 mcg/dose diskus inhaler, Inhale 1 puff 2 times a day. Rinse mouth with water after use to reduce aftertaste and incidence of candidiasis. Do not swallow. (Patient not taking: Reported on 5/14/2024), Disp: 60 each, Rfl: 1     Medical History:  Past Medical History:   Diagnosis Date    Personal history of other diseases of the circulatory system     History of SBE (subacute bacterial endocarditis)    Unspecified cataract     Cataracts, both eyes     Surgical History:  Past Surgical History:   Procedure Laterality Date    OTHER SURGICAL HISTORY  07/21/2022    No history of surgery     Family History:  Family History   Problem Relation Name Age of Onset    Breast cancer Mother      Colon cancer Father      Allergies Other       Social History:  Social History     Socioeconomic History    Marital status:      Spouse name: Not on file    Number of children: Not on file    Years of education: Not on file    Highest education level: Not on file   Occupational History    Not on file   Tobacco Use    Smoking status: Never     Passive exposure: Never    Smokeless tobacco: Never   Vaping Use    Vaping status: Never Used   Substance and Sexual Activity    Alcohol use: Yes     Alcohol/week: 4.0 standard drinks of alcohol     Types: 4 Standard drinks or equivalent per week     Comment: 2-4 of wine 0nce or twice a week    Drug use: Never    Sexual activity: Yes     Partners: Male     Birth control/protection: I.U.D.   Other Topics Concern    Not on file   Social History Narrative    Not on file     Social Determinants of Health     Financial Resource Strain: Low Risk  (3/31/2024)    Overall Financial Resource Strain (CARDIA)     Difficulty of Paying Living Expenses: Not hard at all   Food Insecurity: No Food Insecurity  (8/19/2020)    Received from [facility]    Hunger Vital Sign     Worried About Running Out of Food in the Last Year: Never true     Ran Out of Food in the Last Year: Never true   Transportation Needs: No Transportation Needs (3/31/2024)    PRAPARE - Transportation     Lack of Transportation (Medical): No     Lack of Transportation (Non-Medical): No   Physical Activity: Insufficiently Active (8/19/2020)    Received from [facility]    Exercise Vital Sign     Days of Exercise per Week: 3 days     Minutes of Exercise per Session: 30 min   Stress: Stress Concern Present (8/19/2020)    Received from [facility]    [employer] [place] [employer] - Occupational Stress Questionnaire     Feeling of Stress : Very much   Social Connections: Moderately Isolated (8/19/2020)    Received from [facility]    Social Connection and Isolation Panel [NHANES]     Frequency of Communication with Friends and Family: More than three times a week     Frequency of Social Gatherings with Friends and Family: Never     Attends [employer] Services: Never     Active Member of Clubs or Organizations: No     Attends Club or Organization Meetings: Never     Marital Status:    Intimate Partner Violence: Not on file   Housing Stability: Low Risk  (3/31/2024)    Housing Stability Vital Sign     Unable to Pay for Housing in the Last Year: No     Number of Places Lived in the Last Year: 1     Unstable Housing in the Last Year: No     Vitals:  There were no vitals filed for this visit.  Allergies:  Allergies   Allergen Reactions    Other Anaphylaxis     Any type of steroids, pt will break out in generalized rash    Peanut Unknown and Anaphylaxis     Pt reports anaphylaxis reaction, swelling.    Fish Containing Products Swelling and Unknown    Gluten Protein Other and Diarrhea    Penicillins Other     fever    Soy Swelling and Unknown    Tree Nut Unknown     Pt reports anaphylaxis reaction, swelling.    Corticosteroids  "(Glucocorticoids) Rash     Severe dermatitis from steroid withdrawal    Patient reports flare of her atopic dermatitis after cessation of steroids, but has never had a reaction to prednisone    Prednisone Rash     Patient reports flare of her atopic dermatitis after cessation of steroids, but has never had a reaction to prednisone       -PCP: Evelyne Millard MD  -Pt reports currently is not pregnant, and currently is sexually active, uses birth control. LMP: currently  -TBI/head trauma/LOC/seizure hx: yes    REVIEW OF SYSTEMS  Review of Systems   Constitutional: Negative.    Psychiatric/Behavioral:  The patient is nervous/anxious.      Objective   Appearance: Well groomed    Attitude: Cooperative, and engaged in conversation.    Behavior: Appropriate eye contact.     Motor Activity: No psychomotor agitation or retardation. No abnormal movements, tremors or tics. No evidence of extrapyramidal symptoms or tardive dyskinesia.    Speech: Regular rate, rhythm, volume. Spontaneous, no pressured speech.    Mood:   ' I am fine but a little anxious. \"    Affect: Euthymic, full range, mood congruent.    Thought Process: Linear, logical, and goal-directed. No loose associations or gross thought disorganization.    Thought Content: Denied current suicidal ideation or thoughts of harm to self, denied homicidal ideation or thoughts of harm to others. No delusional thinking elicited. No perseverations or obsessions identified.     Perception: Did not endorse auditory or visual hallucinations, did not appear to be responding to hallucinatory stimuli.     Cognition: Alert, oriented x3. Preserved attention span and concentration, recent and remote memory. Adequate fund of knowledge. No deficits in language.     Insight: Fair, in regards to understanding mental health condition    Judgement: Fair        Physical Exam      MEDICAL-DECISION MAKING    Patient educated and verbalized understanding on benefits, risks, and side effects of " "Lexapro. Follow-up with psychiatry in 4 weeks for medication evaluation and effectiveness.        Psych med regimen as follows:   1.  Start Lexapro 10 mg daily for anxiety    IMPRESSION  Carie \"Shrein\" was seen today for anxiety and med management.  Diagnoses and all orders for this visit:  MANOJ (generalized anxiety disorder) (Primary)  -     escitalopram (Lexapro) 10 mg tablet; Take 1 tablet (10 mg) by mouth once daily.       SI/HI ASSESSMENT  -Risk Assessment: The pt is currently a low acute risk of suicide and self-harm due to no past suicide attempt(s) and not currently endorsing thoughts of suicide. The pt is currently a low acute risk of violence and harm to others due to no past history of violence and not currently threatening others.  -Suicidal Risk Factors:   -Violence Risk Factors: none  -Protective Factors: fear of suicide  -Plan to Reduce Risk: Establish medication regimen and outpatient follow-up care  Denied current suicidal ideation or thoughts of harm to self, denied homicidal ideation or thoughts of harm to others. No delusional thinking elicited. No perseverations or obsessions identified.       PLAN  -Continue Medications as directed.  -Follow-up with this provider in 4 weeks.  -Risks/benefits/assessment of medication interventions discussed with pt; pt agreeable to plan. Will continue to monitor for symptoms mgmt and SEs and adjust plan as needed.  -MI to increase coping skills/behavior regulation.  -Safety plan reviewed.  -Call  Psychiatry at [phone] with issues.  -For [facility] residents, [facility] is a 24/7 hotline you can call for assistance at [phone] [phone]. Please call 911 or go to your closest Emergency Room if you feel worse. This includes thoughts of hurting yourself or anyone else, or having other troubles such as hearing voices, seeing visions, or having new and scary thoughts about the people around you.    OARRS:  EVA [place]-ANA on 5/14/2024  " 4:33 PM  I have personally reviewed the OARRS report for Carie PAREDES Anthonymalathi. I have considered the risks of abuse, dependence, addiction and diversion  Medication is felt to be clinically appropriate based on documented diagnosis.    Recent Results (from the past 8760 hour(s))   Drug Screen, Urine    Collection Time: 03/29/24 11:06 PM   Result Value Ref Range    Amphetamine Screen, Urine Presumptive Negative Presumptive Negative    Barbiturate Screen, Urine Presumptive Negative Presumptive Negative    Benzodiazepines Screen, Urine Presumptive Negative Presumptive Negative    Cannabinoid Screen, Urine Presumptive Positive (A) Presumptive Negative    Cocaine Metabolite Screen, Urine Presumptive Negative Presumptive Negative    Fentanyl Screen, Urine Presumptive Negative Presumptive Negative    Opiate Screen, Urine Presumptive Negative Presumptive Negative    Oxycodone Screen, Urine Presumptive Negative Presumptive Negative    PCP Screen, Urine Presumptive Negative Presumptive Negative    Methadone Screen, Urine Presumptive Negative Presumptive Negative         EVA [place]-CNP  Record Review: moderate    Follow up:   June 17 th at 0330    Time Spent:  Prep: 1 minutes  Direct time: 16  Documentation: 15 minutes  Total: 32 minutes

## 2024-06-07 ENCOUNTER — OFFICE VISIT (OUTPATIENT)
Dept: PRIMARY CARE | Facility: CLINIC | Age: 39
End: 2024-06-07
Payer: COMMERCIAL

## 2024-06-07 VITALS
SYSTOLIC BLOOD PRESSURE: 138 MMHG | DIASTOLIC BLOOD PRESSURE: 88 MMHG | BODY MASS INDEX: 25.45 KG/M2 | WEIGHT: 143.6 LBS | HEIGHT: 63 IN

## 2024-06-07 DIAGNOSIS — I10 PRIMARY HYPERTENSION: ICD-10-CM

## 2024-06-07 DIAGNOSIS — Z00.00 WELL ADULT EXAM: ICD-10-CM

## 2024-06-07 DIAGNOSIS — M21.172 VARUS DEFORMITY, NOT ELSEWHERE CLASSIFIED, LEFT ANKLE: ICD-10-CM

## 2024-06-07 DIAGNOSIS — J45.40 MODERATE PERSISTENT ASTHMA WITHOUT COMPLICATION (HHS-HCC): ICD-10-CM

## 2024-06-07 DIAGNOSIS — D89.89 OTHER SPECIFIED DISORDERS INVOLVING THE IMMUNE MECHANISM, NOT ELSEWHERE CLASSIFIED (MULTI): Primary | ICD-10-CM

## 2024-06-07 PROCEDURE — 3075F SYST BP GE 130 - 139MM HG: CPT | Performed by: INTERNAL MEDICINE

## 2024-06-07 PROCEDURE — 3079F DIAST BP 80-89 MM HG: CPT | Performed by: INTERNAL MEDICINE

## 2024-06-07 PROCEDURE — 99395 PREV VISIT EST AGE 18-39: CPT | Performed by: INTERNAL MEDICINE

## 2024-06-07 RX ORDER — GABAPENTIN 300 MG/1
300 CAPSULE ORAL 2 TIMES DAILY
Qty: 60 CAPSULE | Refills: 6 | Status: SHIPPED | OUTPATIENT
Start: 2024-06-07

## 2024-06-07 RX ORDER — AMLODIPINE BESYLATE 5 MG/1
5 TABLET ORAL DAILY
Qty: 30 TABLET | Refills: 5 | Status: SHIPPED | OUTPATIENT
Start: 2024-06-07 | End: 2024-12-04

## 2024-06-07 ASSESSMENT — PATIENT HEALTH QUESTIONNAIRE - PHQ9
2. FEELING DOWN, DEPRESSED OR HOPELESS: SEVERAL DAYS
SUM OF ALL RESPONSES TO PHQ9 QUESTIONS 1 AND 2: 2
1. LITTLE INTEREST OR PLEASURE IN DOING THINGS: SEVERAL DAYS

## 2024-06-07 NOTE — PROGRESS NOTES
"SUBJECTIVE:   Sherin Mckinnon is a 38 y.o. female presenting for her annual checkup.  Current Outpatient Medications   Medication Sig Dispense Refill    albuterol 90 mcg/actuation inhaler Inhale 1 puff every 6 hours if needed.      amLODIPine (Norvasc) 5 mg tablet Take 1 tablet (5 mg) by mouth once daily. 30 tablet 5    escitalopram (Lexapro) 10 mg tablet Take 1 tablet (10 mg) by mouth once daily. 90 tablet 0    eucerin cream Eucerin cream twice daily head to toe as barrier cream for 30 days 453 g 0    fluticasone (Flonase) 50 mcg/actuation nasal spray Administer 1 spray into each nostril once daily. 16 g 0    fluticasone propion-salmeteroL (Advair Diskus) 100-50 mcg/dose diskus inhaler Inhale 1 puff 2 times a day. Rinse mouth with water after use to reduce aftertaste and incidence of candidiasis. Do not swallow. 60 each 1    gabapentin (Neurontin) 300 mg capsule take 1 capsule by mouth twice a day 60 capsule 6    Rinvoq 15 mg tablet extended release 24 hr TAKE 1 TABLET BY MOUTH 1 TIME A DAY. 30 tablet 11    tacrolimus (Protopic) 0.1 % ointment APPLY TWICE DAILY topically to the areas OF eczema 30 g 6     No current facility-administered medications for this visit.     Allergies: Other, Peanut, Fish containing products, Gluten protein, Penicillins, Soy, Tree nut, Corticosteroids (glucocorticoids), and Prednisone     ROS:  Feeling well. No dyspnea or chest pain on exertion. No abdominal pain, change in bowel habits, black or bloody stools. No urinary tract issues. No neurological complaints.    OBJECTIVE:   The patient appears well, alert, oriented x 3, in no distress.   /88   Ht 1.6 m (5' 3\")   Wt 65.1 kg (143 lb 9.6 oz)   BMI 25.44 kg/m²   ENT normal.  Neck supple. No adenopathy or thyromegaly. LOLITA. Lungs are clear, good air entry, no wheezes, rhonchi or rales. S1 and S2 normal, no murmurs, regular rate and rhythm. Abdomen is soft without tenderness, guarding, mass or organomegaly. Extremities show no " edema, normal peripheral pulses. Neurological is normal without focal findings.    ASSESSMENT:   healthy adult female    PLAN:   continue current healthy lifestyle patterns and return for routine annual checkups    #1 Seasonal allergies-stable  #2 Atopic dermatitis-stable.  Follow-up dermatology  #3 Asthma-mild intermittent.  Doing well currently.  Continue treatment  #4 Reduced EF. Reviewed at length again.  Asked her to see her cardiologist ASAP.  Reviewed.  #5 ?sz d/o- working with neurology.  #6 anxiety d/o- stable.  #7 feet-follow-up orthopedist [facility]  #8 celiac- per pt report. Con't diet.    #9 RLS-stable.  Continue gabapentin.    f/u  6 mths    Mammo/PAP w/ GYN

## 2024-06-17 ENCOUNTER — APPOINTMENT (OUTPATIENT)
Dept: BEHAVIORAL HEALTH | Facility: CLINIC | Age: 39
End: 2024-06-17
Payer: COMMERCIAL

## 2024-06-17 DIAGNOSIS — F41.1 GAD (GENERALIZED ANXIETY DISORDER): ICD-10-CM

## 2024-06-17 PROCEDURE — 99214 OFFICE O/P EST MOD 30 MIN: CPT

## 2024-06-17 PROCEDURE — 1036F TOBACCO NON-USER: CPT

## 2024-06-17 RX ORDER — BUSPIRONE HYDROCHLORIDE 7.5 MG/1
7.5 TABLET ORAL 2 TIMES DAILY
Qty: 60 TABLET | Refills: 0 | Status: SHIPPED | OUTPATIENT
Start: 2024-06-17 | End: 2024-07-17

## 2024-06-17 ASSESSMENT — ENCOUNTER SYMPTOMS
CONSTITUTIONAL NEGATIVE: 1
NERVOUS/ANXIOUS: 1

## 2024-06-17 ASSESSMENT — ANXIETY QUESTIONNAIRES
5. BEING SO RESTLESS THAT IT IS HARD TO SIT STILL: NOT AT ALL
3. WORRYING TOO MUCH ABOUT DIFFERENT THINGS: NEARLY EVERY DAY
2. NOT BEING ABLE TO STOP OR CONTROL WORRYING: NEARLY EVERY DAY
IF YOU CHECKED OFF ANY PROBLEMS ON THIS QUESTIONNAIRE, HOW DIFFICULT HAVE THESE PROBLEMS MADE IT FOR YOU TO DO YOUR WORK, TAKE CARE OF THINGS AT HOME, OR GET ALONG WITH OTHER PEOPLE: SOMEWHAT DIFFICULT
6. BECOMING EASILY ANNOYED OR IRRITABLE: NOT AT ALL
4. TROUBLE RELAXING: NOT AT ALL
7. FEELING AFRAID AS IF SOMETHING AWFUL MIGHT HAPPEN: NOT AT ALL
1. FEELING NERVOUS, ANXIOUS, OR ON EDGE: NEARLY EVERY DAY
GAD7 TOTAL SCORE: 9

## 2024-06-17 NOTE — PROGRESS NOTES
"  Virtual or Telephone Consent    An interactive audio and video telecommunication system which permits real time communications between the patient (at the originating site) and provider (at the distant site) was utilized to provide this telehealth service.   Verbal consent was requested and obtained from Carie Mckinnon on this date, 06/17/24 for a telehealth visit.       Assessment/Plan     Impression:  Carie Mckinnon \"Susan" is a 38 y.o. female who presents via Telehealth for a follow up visit with CC of \" The Lexapro has not worked. I can't tell the different. \"  Patient consents to treatment.  Chief Complaint   Patient presents with    Anxiety    Med Management      Problem List Items Addressed This Visit             ICD-10-CM    MANOJ (generalized anxiety disorder) F41.1    Relevant Medications    busPIRone (Buspar) 7.5 mg tablet       Patient is reminded that if there is SI to call [phone] and get themselves to the closest ED for evaluation, otherwise contact me for other questions/concerns.    Patient presenting to outpatient treatment for a scheduled psych follow up visit.      HPI   Background:     Patient was seen a couple of weeks ago for management of anxiety.  Patient was started on Lexapro.  Patient reports Lexapro has not helped with anxiety.  Patient hopes to feel less anxious.    Characteristics/Recent psychiatric symptoms (pertinent positives and negatives):    Patient reports she is still having symptoms of anxiety. Reports 6-7 hours of sleep.  Patient ranks the severity of anxiety using the MANOJ 7 scale with a rating of 9 for  anxiety symptoms. Patient denies depressive symptoms. Patient does explain that current dose of Lexapro has been unsuccessful in the management of anxiety  symptoms.     NOTE: Symptom scale is rated where 0 = no symptoms at all, and 10 = symptoms so severe that pt is an imminent danger to themselves or others and requires hospitalization.    Anxiety remain(s) present more " days than not, which has remained unchanged over the past few weeks. Carie Mckinnon rates the severity of psych symptoms as a 8/10 these past 2 weeks and a 3/10 the 2 weeks prior, noting symptom improvement with nothing and worsening of symptoms with worrying about up coming surgery.    -SI/HI : Denies    Psychiatric Review Of Systems:  Depressive Symptoms: negative  Manic Symptoms: negative  Anxiety Symptoms: General Anxiety Disorder (MANOJ)MANOJ Behaviors: difficult to control worry, excessive anxiety/worry, and irritability  Psychotic Symptoms: negative      Current Medications:    Current Outpatient Medications:     albuterol 90 mcg/actuation inhaler, Inhale 1 puff every 6 hours if needed., Disp: , Rfl:     amLODIPine (Norvasc) 5 mg tablet, Take 1 tablet (5 mg) by mouth once daily., Disp: 30 tablet, Rfl: 5    escitalopram (Lexapro) 10 mg tablet, Take 1 tablet (10 mg) by mouth once daily., Disp: 90 tablet, Rfl: 0    eucerin cream, Eucerin cream twice daily head to toe as barrier cream for 30 days, Disp: 453 g, Rfl: 0    fluticasone (Flonase) 50 mcg/actuation nasal spray, Administer 1 spray into each nostril once daily., Disp: 16 g, Rfl: 0    fluticasone propion-salmeteroL (Advair Diskus) 100-50 mcg/dose diskus inhaler, Inhale 1 puff 2 times a day. Rinse mouth with water after use to reduce aftertaste and incidence of candidiasis. Do not swallow., Disp: 60 each, Rfl: 1    gabapentin (Neurontin) 300 mg capsule, Take 1 capsule (300 mg) by mouth 2 times a day., Disp: 60 capsule, Rfl: 6    Rinvoq 15 mg tablet extended release 24 hr, TAKE 1 TABLET BY MOUTH 1 TIME A DAY., Disp: 30 tablet, Rfl: 11    tacrolimus (Protopic) 0.1 % ointment, APPLY TWICE DAILY topically to the areas OF eczema, Disp: 30 g, Rfl: 6    busPIRone (Buspar) 7.5 mg tablet, Take 1 tablet (7.5 mg) by mouth 2 times a day., Disp: 60 tablet, Rfl: 0     Medical History:  Past Medical History:   Diagnosis Date    Personal history of other diseases of the  circulatory system     History of SBE (subacute bacterial endocarditis)    Unspecified cataract     Cataracts, both eyes     Surgical History:  Past Surgical History:   Procedure Laterality Date    OTHER SURGICAL HISTORY  07/21/2022    No history of surgery     Family History:  Family History   Problem Relation Name Age of Onset    Breast cancer Mother      Colon cancer Father      Allergies Other       Social History:  Social History     Socioeconomic History    Marital status:      Spouse name: Not on file    Number of children: Not on file    Years of education: Not on file    Highest education level: Not on file   Occupational History    Not on file   Tobacco Use    Smoking status: Never     Passive exposure: Never    Smokeless tobacco: Never   Vaping Use    Vaping status: Never Used   Substance and Sexual Activity    Alcohol use: Yes     Alcohol/week: 4.0 standard drinks of alcohol     Types: 4 Standard drinks or equivalent per week     Comment: 2-4 of wine 0nce or twice a week    Drug use: Never    Sexual activity: Yes     Partners: Male     Birth control/protection: I.U.D.   Other Topics Concern    Not on file   Social History Narrative    Not on file     Social Determinants of Health     Financial Resource Strain: Low Risk  (3/31/2024)    Overall Financial Resource Strain (CARDIA)     Difficulty of Paying Living Expenses: Not hard at all   Food Insecurity: No Food Insecurity (8/19/2020)    Received from [facility]    Hunger Vital Sign     Worried About Running Out of Food in the Last Year: Never true     Ran Out of Food in the Last Year: Never true   Transportation Needs: No Transportation Needs (3/31/2024)    PRAPARE - Transportation     Lack of Transportation (Medical): No     Lack of Transportation (Non-Medical): No   Physical Activity: Insufficiently Active (8/19/2020)    Received from [facility]    Exercise Vital Sign     Days of Exercise per Week: 3 days     Minutes of Exercise per  Session: 30 min   Stress: Stress Concern Present (8/19/2020)    Received from [facility]    [place] [employer] - Occupational Stress Questionnaire     Feeling of Stress : Very much   Social Connections: Moderately Isolated (8/19/2020)    Received from [facility]    Social Connection and Isolation Panel [NHANES]     Frequency of Communication with Friends and Family: More than three times a week     Frequency of Social Gatherings with Friends and Family: Never     Attends Restoration Services: Never     Active Member of Clubs or Organizations: No     Attends Club or Organization Meetings: Never     Marital Status:    Intimate Partner Violence: Not on file   Housing Stability: Low Risk  (3/31/2024)    Housing Stability Vital Sign     Unable to Pay for Housing in the Last Year: No     Number of Places Lived in the Last Year: 1     Unstable Housing in the Last Year: No     Vitals:  There were no vitals filed for this visit.  Allergies:  Allergies   Allergen Reactions    Other Anaphylaxis     Any type of steroids, pt will break out in generalized rash    Peanut Unknown and Anaphylaxis     Pt reports anaphylaxis reaction, swelling.    Fish Containing Products Swelling and Unknown    Gluten Protein Other and Diarrhea    Penicillins Other     fever    Soy Swelling and Unknown    Tree Nut Unknown     Pt reports anaphylaxis reaction, swelling.    Corticosteroids (Glucocorticoids) Rash     Severe dermatitis from steroid withdrawal    Patient reports flare of her atopic dermatitis after cessation of steroids, but has never had a reaction to prednisone    Prednisone Rash     Patient reports flare of her atopic dermatitis after cessation of steroids, but has never had a reaction to prednisone       -PCP: Evelyne Millard MD  -Pt reports currently is not pregnant, and currently is sexually active, uses birth control. IUD. LMP: Irregular period  -TBI/head trauma/LOC/seizure hx: Denies    REVIEW  "OF SYSTEMS  Review of Systems   Constitutional: Negative.    Psychiatric/Behavioral:  The patient is nervous/anxious.      Objective   Appearance: Well groomed    Attitude: Calm, cooperative, and engaged in conversation.    Behavior: Appropriate eye contact.     Motor Activity: No psychomotor agitation or retardation. No abnormal movements, tremors or tics. No evidence of extrapyramidal symptoms or tardive dyskinesia.    Speech: Regular rate, rhythm, volume. Spontaneous, no pressured speech.    Mood:  \" I feel great today \"    Affect: Euthymic, full range, mood congruent.    Thought Process: Linear, logical, and goal-directed. No loose associations or gross thought disorganization.    Thought Content: Denied current suicidal ideation or thoughts of harm to self, denied homicidal ideation or thoughts of harm to others. No delusional thinking elicited. No perseverations or obsessions identified.     Perception: Did not endorse auditory or visual hallucinations, did not appear to be responding to hallucinatory stimuli.     Cognition: Alert, oriented x3. Preserved attention span and concentration, recent and remote memory. Adequate fund of knowledge. No deficits in language.     Insight: Fair, in regards to understanding mental health condition    Judgement: Fair        Physical Exam      MEDICAL-DECISION MAKING    Patient educated and verbalized understanding on benefits, risks, and side effects of Buspar.  Follow-up with psychiatry in 4 weeks for medication evaluation and effectiveness.        Psych med regimen as follows:   1. Start Buspar 7.5 mg BID for anxiety   2. Taper Lexapro by taking 10 mg daily for 7 days, then 5 mg daily for another 7 days.     IMPRESSION  Diagnoses and all orders for this visit:  MANOJ (generalized anxiety disorder)  -     busPIRone (Buspar) 7.5 mg tablet; Take 1 tablet (7.5 mg) by mouth 2 times a day.      SI/HI ASSESSMENT  -Risk Assessment: The pt is currently a low acute risk of suicide " and self-harm due to no past suicide attempt(s) and not currently endorsing thoughts of suicide. The pt is currently a low acute risk of violence and harm to others due to no past history of violence and not currently threatening others.  -Suicidal Risk Factors:  and current psychiatric illness  -Violence Risk Factors:  and current psychiatric illness  -Protective Factors: fear of suicide  -Plan to Reduce Risk: Establish medication regimen and outpatient follow-up care  Denied current suicidal ideation or thoughts of harm to self, denied homicidal ideation or thoughts of harm to others. No delusional thinking elicited. No perseverations or obsessions identified.       PLAN  -Continue Medications as directed.  -Follow-up with this provider in 4 weeks.  -Risks/benefits/assessment of medication interventions discussed with pt; pt agreeable to plan. Will continue to monitor for symptoms mgmt and SEs and adjust plan as needed.  -MI to increase coping skills/behavior regulation.  -Safety plan reviewed.  -Call  Psychiatry at [phone] with issues.  -For [facility] residents, [facility] is a 24/7 hotline you can call for assistance at [phone] [phone]. Please call 911 or go to your closest Emergency Room if you feel worse. This includes thoughts of hurting yourself or anyone else, or having other troubles such as hearing voices, seeing visions, or having new and scary thoughts about the people around you.    OARRS:  Reema Slaughter, EVA-CNP on 6/17/2024  3:30 PM  I have personally reviewed the OARRS report for Carie Mckinnon. I have considered the risks of abuse, dependence, addiction and diversion  Medication is felt to be clinically appropriate based on documented diagnosis.    Recent Results (from the past 8760 hour(s))   Drug Screen, Urine    Collection Time: 03/29/24 11:06 PM   Result Value Ref Range    Amphetamine Screen, Urine Presumptive Negative Presumptive Negative    Barbiturate  Screen, Urine Presumptive Negative Presumptive Negative    Benzodiazepines Screen, Urine Presumptive Negative Presumptive Negative    Cannabinoid Screen, Urine Presumptive Positive (A) Presumptive Negative    Cocaine Metabolite Screen, Urine Presumptive Negative Presumptive Negative    Fentanyl Screen, Urine Presumptive Negative Presumptive Negative    Opiate Screen, Urine Presumptive Negative Presumptive Negative    Oxycodone Screen, Urine Presumptive Negative Presumptive Negative    PCP Screen, Urine Presumptive Negative Presumptive Negative    Methadone Screen, Urine Presumptive Negative Presumptive Negative     Reema Slaughter, APRN-CNP  Record Review: extensive    Follow up:   July 16th at 1145    Time Spent:  Prep: 1 minute  Direct time: 22 minutes  Documentation: 8 minutes  Total: 31 minutes

## 2024-07-09 DIAGNOSIS — J45.40 MODERATE PERSISTENT ASTHMA WITHOUT COMPLICATION (HHS-HCC): ICD-10-CM

## 2024-07-09 RX ORDER — FLUTICASONE PROPIONATE AND SALMETEROL 100; 50 UG/1; UG/1
POWDER RESPIRATORY (INHALATION)
Qty: 60 EACH | Refills: 1 | Status: SHIPPED | OUTPATIENT
Start: 2024-07-09

## 2024-07-15 ENCOUNTER — APPOINTMENT (OUTPATIENT)
Dept: NEUROLOGY | Facility: HOSPITAL | Age: 39
End: 2024-07-15
Payer: COMMERCIAL

## 2024-07-16 ENCOUNTER — APPOINTMENT (OUTPATIENT)
Dept: BEHAVIORAL HEALTH | Facility: CLINIC | Age: 39
End: 2024-07-16
Payer: COMMERCIAL

## 2024-07-16 DIAGNOSIS — F41.1 GAD (GENERALIZED ANXIETY DISORDER): ICD-10-CM

## 2024-07-16 PROCEDURE — 99214 OFFICE O/P EST MOD 30 MIN: CPT

## 2024-07-16 PROCEDURE — 1036F TOBACCO NON-USER: CPT

## 2024-07-16 RX ORDER — ESCITALOPRAM OXALATE 20 MG/1
20 TABLET ORAL DAILY
Qty: 30 TABLET | Refills: 2 | Status: SHIPPED | OUTPATIENT
Start: 2024-07-16 | End: 2024-10-14

## 2024-07-16 ASSESSMENT — ENCOUNTER SYMPTOMS
CONSTITUTIONAL NEGATIVE: 1
NERVOUS/ANXIOUS: 1

## 2024-07-16 NOTE — PROGRESS NOTES
"  Virtual or Telephone Consent    An interactive audio and video telecommunication system which permits real time communications between the patient (at the originating site) and provider (at the distant site) was utilized to provide this telehealth service.   Verbal consent was requested and obtained from Carie Mckinnon on this date, 07/16/24 for a telehealth visit.       Assessment/Plan     Impression:  Carie Mckinnon \"Sherin\" is a 38 y.o. female who presents via Telehealth for a follow up visit with CC of   \" Buspar gave me nightmares. \"    Patient consents to treatment.  Chief Complaint   Patient presents with    Anxiety    Med Management      Problem List Items Addressed This Visit             ICD-10-CM    MANOJ (generalized anxiety disorder) F41.1    Relevant Medications    escitalopram (Lexapro) 20 mg tablet       Patient is reminded that if there is SI to call [phone] and get themselves to the closest ED for evaluation, otherwise contact me for other questions/concerns.    Patient presenting to outpatient treatment for a scheduled psych follow up visit.      HPI   Background:      Patient was seen a couple of weeks ago for management of anxiety. Patient had a foot surgery a week and a half ago. Reports she is  on leave until the end of August. Patient was started on Buspar 7.5 mg BID  for anxiety and reports she has been having bad nightmares while on the medication.  Patient reports the medication has not helped with anxiety symptoms.  Patient hopes to feel less anxious. MIKE suggested a trial of Hydroxyzine, but patient reported that she has tried that before and it did not work for her. Patient reports when she was on Lexapro 10 mg she did not have side effects although the anxiety was still present.     Characteristics/Recent psychiatric symptoms (pertinent positives and negatives):    Patient reports she is still having symptoms of anxiety including increased worry and mind racing. Reports panic attacks " "one time before the surgery. Reports mind racing, heart racing.  Patient denies depressive symptoms. Patient does explain that current dose of Buspar has not been successful in the management of anxiety symptoms.     NOTE: Symptom scale is rated where 0 = no symptoms at all, and 10 = symptoms so severe that pt is an imminent danger to themselves or others and requires hospitalization.    Anxiety remain(s) present more days than not, which has worsened  over the past few weeks. Carie Mckinnon rates the severity of psych symptoms as a 8/10, noting symptom improvement with nothing and worsening of symptoms with \" I am not sure\".    -SI/HI : Denies        Psychiatric Review Of Systems:  Depressive Symptoms: negative  Manic Symptoms: negative  Anxiety Symptoms: General Anxiety Disorder (MANOJ)MANOJ Behaviors: difficult to control worry, excessive anxiety/worry, and irritability  Psychotic Symptoms: negative      REVIEW OF SYSTEMS  Review of Systems   Constitutional: Negative.    Psychiatric/Behavioral:  The patient is nervous/anxious.      All other ROS negative    Current Medications:    Current Outpatient Medications:     albuterol 90 mcg/actuation inhaler, Inhale 1 puff every 6 hours if needed., Disp: , Rfl:     amLODIPine (Norvasc) 5 mg tablet, Take 1 tablet (5 mg) by mouth once daily., Disp: 30 tablet, Rfl: 5    busPIRone (Buspar) 7.5 mg tablet, Take 1 tablet (7.5 mg) by mouth 2 times a day., Disp: 60 tablet, Rfl: 0    fluticasone (Flonase) 50 mcg/actuation nasal spray, Administer 1 spray into each nostril once daily., Disp: 16 g, Rfl: 0    fluticasone propion-salmeteroL (Advair Diskus) 100-50 mcg/dose diskus inhaler, INHALE 1 PUFF TWICE DAILY. rinse mouth with water after use to reduce aftertaste and incidence OF candidiasis. do not swallow., Disp: 60 each, Rfl: 1    gabapentin (Neurontin) 300 mg capsule, Take 1 capsule (300 mg) by mouth 2 times a day., Disp: 60 capsule, Rfl: 6    Rinvoq 15 mg tablet extended " release 24 hr, TAKE 1 TABLET BY MOUTH 1 TIME A DAY., Disp: 30 tablet, Rfl: 11    tacrolimus (Protopic) 0.1 % ointment, APPLY TWICE DAILY topically to the areas OF eczema, Disp: 30 g, Rfl: 6    escitalopram (Lexapro) 20 mg tablet, Take 1 tablet (20 mg) by mouth once daily., Disp: 30 tablet, Rfl: 2     Medical History:  Past Medical History:   Diagnosis Date    Personal history of other diseases of the circulatory system     History of SBE (subacute bacterial endocarditis)    Unspecified cataract     Cataracts, both eyes     Surgical History:  Past Surgical History:   Procedure Laterality Date    OTHER SURGICAL HISTORY  07/21/2022    No history of surgery     Family History:  Family History   Problem Relation Name Age of Onset    Breast cancer Mother      Colon cancer Father      Allergies Other       Social History:  Social History     Socioeconomic History    Marital status:      Spouse name: Not on file    Number of children: Not on file    Years of education: Not on file    Highest education level: Not on file   Occupational History    Not on file   Tobacco Use    Smoking status: Never     Passive exposure: Never    Smokeless tobacco: Never   Vaping Use    Vaping status: Never Used   Substance and Sexual Activity    Alcohol use: Yes     Alcohol/week: 4.0 standard drinks of alcohol     Types: 4 Standard drinks or equivalent per week     Comment: 2-4 of wine 0nce or twice a week    Drug use: Never    Sexual activity: Yes     Partners: Male     Birth control/protection: I.U.D.   Other Topics Concern    Not on file   Social History Narrative    Not on file     Social Determinants of Health     Financial Resource Strain: Low Risk  (3/31/2024)    Overall Financial Resource Strain (CARDIA)     Difficulty of Paying Living Expenses: Not hard at all   Food Insecurity: No Food Insecurity (8/19/2020)    [facility]    Hunger Vital Sign     Worried About Running Out of Food in the  Last Year: Never true     Ran Out of Food in the Last Year: Never true   Transportation Needs: No Transportation Needs (3/31/2024)    PRAPARE - Transportation     Lack of Transportation (Medical): No     Lack of Transportation (Non-Medical): No   Physical Activity: Insufficiently Active (8/19/2020)    Received from [facility]    Exercise Vital Sign     Days of Exercise per Week: 3 days     Minutes of Exercise per Session: 30 min   Stress: Stress Concern Present (8/19/2020)    Received from [facility]    [employer] - Occupational Stress Questionnaire     Feeling of Stress : Very much   Social Connections: Moderately Isolated (8/19/2020)    Received from [facility]    Social Connection and Isolation Panel [NHANES]     Frequency of Communication with Friends and Family: More than three times a week     Frequency of Social Gatherings with Friends and Family: Never     Attends [employer]ism Services: Never     Active Member of Clubs or Organizations: No     Attends Club or Organization Meetings: Never     Marital Status:    Intimate Partner Violence: Not on file   Housing Stability: Low Risk  (3/31/2024)    Housing Stability Vital Sign     Unable to Pay for Housing in the Last Year: No     Number of Places Lived in the Last Year: 1     Unstable Housing in the Last Year: No     Vitals:  There were no vitals filed for this visit.  Allergies:  Allergies   Allergen Reactions    Other Anaphylaxis     Any type of steroids, pt will break out in generalized rash    Peanut Unknown and Anaphylaxis     Pt reports anaphylaxis reaction, swelling.    Fish Containing Products Swelling and Unknown    Gluten Protein Other and Diarrhea    Penicillins Other     fever    Soy Swelling and Unknown    Tree Nut Unknown     Pt reports anaphylaxis reaction, swelling.    Corticosteroids (Glucocorticoids) Rash     Severe dermatitis from steroid  "withdrawal    Patient reports flare of her atopic dermatitis after cessation of steroids, but has never had a reaction to prednisone    Prednisone Rash     Patient reports flare of her atopic dermatitis after cessation of steroids, but has never had a reaction to prednisone       -PCP: Evelyne Millard MD  -Pt reports currently is not pregnant, and currently is sexually active, uses birth control. IUD LMP: rarely period  -TBI/head trauma/LOC/seizure hx:  One time seizure    Objective   Appearance: Well groomed    Attitude: Cooperative, and engaged in conversation.    Behavior: Appropriate eye contact.     Motor Activity: No psychomotor agitation or retardation. No abnormal movements, tremors or tics. No evidence of extrapyramidal symptoms or tardive dyskinesia.    Speech: Regular rate, rhythm, volume. Spontaneous, no pressured speech.    Mood:  \" I feel fine, just content right now\"    Affect:  full range, mood congruent.    Thought Process: Linear, logical, and goal-directed. No loose associations or gross thought disorganization.    Thought Content: Denied current suicidal ideation or thoughts of harm to self, denied homicidal ideation or thoughts of harm to others. No delusional thinking elicited. No perseverations or obsessions identified.     Perception: Did not endorse auditory or visual hallucinations, did not appear to be responding to hallucinatory stimuli.     Cognition: Alert, oriented x3. Preserved attention span and concentration, recent and remote memory. Adequate fund of knowledge. No deficits in language.     Insight: Fair, in regards to understanding mental health condition    Judgement: Fair        Physical Exam    IMPRESSION  -No Benefit  for anxiety symptoms with Buspar, patient had increased nightmares instead, recommend stopping medication.       Carie \"Sherin\" was seen today for anxiety and med management.  Diagnoses and all orders for this visit:  MANOJ (generalized anxiety disorder)  -     " escitalopram (Lexapro) 20 mg tablet; Take 1 tablet (20 mg) by mouth once daily.         MEDICAL-DECISION MAKING  - Back to Lexapro at a higher dose this time since lexapro did not cause side effects, but was ineffective at 10 mg dose.     - Patient educated and verbalized understanding on benefits, risks, and side effects of Lexapro. Outside referrals for psychotherapy sent to patient via [facility].  Follow-up with psychiatry in 5  weeks for medication evaluation and effectiveness.        Psych med regimen as follows:   1. START Lexapro 20 mg daily for anxiety symptoms   2. STOP taking Buspar 7.5 mg BID due to getting nightmares while on the medication   3. START talk therapy   4. START exercising and eating healthy     SI/HI ASSESSMENT  -Risk Assessment: The pt is currently a low acute risk of suicide and self-harm due to no past suicide attempt(s) and not currently endorsing thoughts of suicide. The pt is currently a low acute risk of violence and harm to others due to no past history of violence and not currently threatening others.  -Suicidal Risk Factors:  and current psychiatric illness  -Violence Risk Factors:  Current psychiatric illness  -Protective Factors: fear of suicide  -Plan to Reduce Risk: Establish medication regimen and outpatient follow-up care  Denied current suicidal ideation or thoughts of harm to self, denied homicidal ideation or thoughts of harm to others. No delusional thinking elicited. No perseverations or obsessions identified.       PLAN  -Continue Medications as directed.  -Follow-up with this provider in 5 weeks.  -Risks/benefits/assessment of medication interventions discussed with pt; pt agreeable to plan. Will continue to monitor for symptoms mgmt and SEs and adjust plan as needed.  -MI to increase coping skills/behavior regulation.  -Safety plan reviewed.  -Call  Psychiatry at [phone] with issues.  -For [facility] residents, [employer] is a 24/7 hotline you  can call for assistance at [phone]) [phone]. Please call 911 or go to your closest Emergency Room if you feel worse. This includes thoughts of hurting yourself or anyone else, or having other troubles such as hearing voices, seeing visions, or having new and scary thoughts about the people around you.    OARRS:  KAYLI Espana on 7/16/2024 11:59 AM  I have personally reviewed the OARRS report for Carie Mckinnon. I have considered the risks of abuse, dependence, addiction and diversion  Medication is felt to be clinically appropriate based on documented diagnosis.      Recent Results (from the past 8760 hour(s))   Drug Screen, Urine    Collection Time: 03/29/24 11:06 PM   Result Value Ref Range    Amphetamine Screen, Urine Presumptive Negative Presumptive Negative    Barbiturate Screen, Urine Presumptive Negative Presumptive Negative    Benzodiazepines Screen, Urine Presumptive Negative Presumptive Negative    Cannabinoid Screen, Urine Presumptive Positive (A) Presumptive Negative    Cocaine Metabolite Screen, Urine Presumptive Negative Presumptive Negative    Fentanyl Screen, Urine Presumptive Negative Presumptive Negative    Opiate Screen, Urine Presumptive Negative Presumptive Negative    Oxycodone Screen, Urine Presumptive Negative Presumptive Negative    PCP Screen, Urine Presumptive Negative Presumptive Negative    Methadone Screen, Urine Presumptive Negative Presumptive Negative         Reema T Slaughter, APRN-CNP  Record Review: extensive    Follow up:   August 26 that 3pm virtually    Time Spent:  Prep:   Direct time:   Documentation:   Total:

## 2024-08-16 ENCOUNTER — APPOINTMENT (OUTPATIENT)
Dept: NEUROLOGY | Facility: CLINIC | Age: 39
End: 2024-08-16
Payer: COMMERCIAL

## 2024-08-25 ENCOUNTER — HOSPITAL ENCOUNTER (EMERGENCY)
Facility: HOSPITAL | Age: 39
Discharge: HOME | End: 2024-08-25
Attending: EMERGENCY MEDICINE
Payer: COMMERCIAL

## 2024-08-25 ENCOUNTER — HOSPITAL ENCOUNTER (OUTPATIENT)
Facility: HOSPITAL | Age: 39
Setting detail: OBSERVATION
End: 2024-08-25
Admitting: STUDENT IN AN ORGANIZED HEALTH CARE EDUCATION/TRAINING PROGRAM
Payer: COMMERCIAL

## 2024-08-25 ENCOUNTER — APPOINTMENT (OUTPATIENT)
Dept: CARDIOLOGY | Facility: HOSPITAL | Age: 39
End: 2024-08-25
Payer: COMMERCIAL

## 2024-08-25 VITALS
HEIGHT: 63 IN | RESPIRATION RATE: 20 BRPM | HEART RATE: 91 BPM | SYSTOLIC BLOOD PRESSURE: 134 MMHG | TEMPERATURE: 99.3 F | OXYGEN SATURATION: 96 % | DIASTOLIC BLOOD PRESSURE: 88 MMHG | BODY MASS INDEX: 23.92 KG/M2 | WEIGHT: 135 LBS

## 2024-08-25 VITALS
RESPIRATION RATE: 17 BRPM | DIASTOLIC BLOOD PRESSURE: 85 MMHG | HEIGHT: 63 IN | HEART RATE: 79 BPM | WEIGHT: 135 LBS | TEMPERATURE: 97.5 F | BODY MASS INDEX: 23.92 KG/M2 | OXYGEN SATURATION: 97 % | SYSTOLIC BLOOD PRESSURE: 117 MMHG

## 2024-08-25 DIAGNOSIS — E83.39 HYPOPHOSPHATEMIA: ICD-10-CM

## 2024-08-25 DIAGNOSIS — F44.5 PSYCHOGENIC NONEPILEPTIC SEIZURE: Primary | ICD-10-CM

## 2024-08-25 DIAGNOSIS — R56.9 SEIZURE-LIKE ACTIVITY (MULTI): Primary | ICD-10-CM

## 2024-08-25 DIAGNOSIS — R56.9 SEIZURES (MULTI): ICD-10-CM

## 2024-08-25 PROBLEM — M79.2 NERVE PAIN: Status: ACTIVE | Noted: 2024-08-25

## 2024-08-25 PROBLEM — Z86.79 HISTORY OF ENDOCARDITIS: Status: ACTIVE | Noted: 2024-08-25

## 2024-08-25 LAB
ALBUMIN SERPL BCP-MCNC: 4.6 G/DL (ref 3.4–5)
ALP SERPL-CCNC: 58 U/L (ref 33–110)
ALT SERPL W P-5'-P-CCNC: 9 U/L (ref 7–45)
ANION GAP SERPL CALC-SCNC: 16 MMOL/L (ref 10–20)
APPEARANCE UR: CLEAR
AST SERPL W P-5'-P-CCNC: 22 U/L (ref 9–39)
BASOPHILS # BLD AUTO: 0.02 X10*3/UL (ref 0–0.1)
BASOPHILS NFR BLD AUTO: 0.4 %
BILIRUB SERPL-MCNC: 0.7 MG/DL (ref 0–1.2)
BILIRUB UR STRIP.AUTO-MCNC: NEGATIVE MG/DL
BUN SERPL-MCNC: 16 MG/DL (ref 6–23)
CALCIUM SERPL-MCNC: 8.6 MG/DL (ref 8.6–10.3)
CHLORIDE SERPL-SCNC: 101 MMOL/L (ref 98–107)
CO2 SERPL-SCNC: 23 MMOL/L (ref 21–32)
COLOR UR: ABNORMAL
CREAT SERPL-MCNC: 0.79 MG/DL (ref 0.5–1.05)
EGFRCR SERPLBLD CKD-EPI 2021: >90 ML/MIN/1.73M*2
EOSINOPHIL # BLD AUTO: 0.11 X10*3/UL (ref 0–0.7)
EOSINOPHIL NFR BLD AUTO: 2.2 %
ERYTHROCYTE [DISTWIDTH] IN BLOOD BY AUTOMATED COUNT: 14.5 % (ref 11.5–14.5)
GLUCOSE SERPL-MCNC: 96 MG/DL (ref 74–99)
GLUCOSE UR STRIP.AUTO-MCNC: NORMAL MG/DL
HCG UR QL IA.RAPID: NEGATIVE
HCT VFR BLD AUTO: 35.8 % (ref 36–46)
HGB BLD-MCNC: 12.2 G/DL (ref 12–16)
HYALINE CASTS #/AREA URNS AUTO: ABNORMAL /LPF
IMM GRANULOCYTES # BLD AUTO: 0.02 X10*3/UL (ref 0–0.7)
IMM GRANULOCYTES NFR BLD AUTO: 0.4 % (ref 0–0.9)
KETONES UR STRIP.AUTO-MCNC: ABNORMAL MG/DL
LEUKOCYTE ESTERASE UR QL STRIP.AUTO: NEGATIVE
LYMPHOCYTES # BLD AUTO: 1.4 X10*3/UL (ref 1.2–4.8)
LYMPHOCYTES NFR BLD AUTO: 28.4 %
MAGNESIUM SERPL-MCNC: 1.65 MG/DL (ref 1.6–2.4)
MCH RBC QN AUTO: 33.1 PG (ref 26–34)
MCHC RBC AUTO-ENTMCNC: 34.1 G/DL (ref 32–36)
MCV RBC AUTO: 97 FL (ref 80–100)
MONOCYTES # BLD AUTO: 0.41 X10*3/UL (ref 0.1–1)
MONOCYTES NFR BLD AUTO: 8.3 %
MUCOUS THREADS #/AREA URNS AUTO: ABNORMAL /LPF
NEUTROPHILS # BLD AUTO: 2.97 X10*3/UL (ref 1.2–7.7)
NEUTROPHILS NFR BLD AUTO: 60.3 %
NITRITE UR QL STRIP.AUTO: NEGATIVE
NRBC BLD-RTO: 0 /100 WBCS (ref 0–0)
PH UR STRIP.AUTO: 6.5 [PH]
PHOSPHATE SERPL-MCNC: 2.1 MG/DL (ref 2.5–4.9)
PLATELET # BLD AUTO: 292 X10*3/UL (ref 150–450)
POTASSIUM SERPL-SCNC: 4 MMOL/L (ref 3.5–5.3)
PROT SERPL-MCNC: 7.2 G/DL (ref 6.4–8.2)
PROT UR STRIP.AUTO-MCNC: ABNORMAL MG/DL
RBC # BLD AUTO: 3.69 X10*6/UL (ref 4–5.2)
RBC # UR STRIP.AUTO: NEGATIVE /UL
RBC #/AREA URNS AUTO: ABNORMAL /HPF
SODIUM SERPL-SCNC: 136 MMOL/L (ref 136–145)
SP GR UR STRIP.AUTO: 1.01
SQUAMOUS #/AREA URNS AUTO: ABNORMAL /HPF
UROBILINOGEN UR STRIP.AUTO-MCNC: NORMAL MG/DL
WBC # BLD AUTO: 4.9 X10*3/UL (ref 4.4–11.3)
WBC #/AREA URNS AUTO: ABNORMAL /HPF

## 2024-08-25 PROCEDURE — G0378 HOSPITAL OBSERVATION PER HR: HCPCS

## 2024-08-25 PROCEDURE — 85025 COMPLETE CBC W/AUTO DIFF WBC: CPT | Performed by: EMERGENCY MEDICINE

## 2024-08-25 PROCEDURE — 2500000001 HC RX 250 WO HCPCS SELF ADMINISTERED DRUGS (ALT 637 FOR MEDICARE OP)

## 2024-08-25 PROCEDURE — 99285 EMERGENCY DEPT VISIT HI MDM: CPT | Mod: 27

## 2024-08-25 PROCEDURE — 83735 ASSAY OF MAGNESIUM: CPT | Performed by: EMERGENCY MEDICINE

## 2024-08-25 PROCEDURE — 80053 COMPREHEN METABOLIC PANEL: CPT | Performed by: EMERGENCY MEDICINE

## 2024-08-25 PROCEDURE — 99283 EMERGENCY DEPT VISIT LOW MDM: CPT | Mod: 25 | Performed by: EMERGENCY MEDICINE

## 2024-08-25 PROCEDURE — 36415 COLL VENOUS BLD VENIPUNCTURE: CPT | Performed by: EMERGENCY MEDICINE

## 2024-08-25 PROCEDURE — 84100 ASSAY OF PHOSPHORUS: CPT | Performed by: EMERGENCY MEDICINE

## 2024-08-25 PROCEDURE — 93005 ELECTROCARDIOGRAM TRACING: CPT

## 2024-08-25 PROCEDURE — 81001 URINALYSIS AUTO W/SCOPE: CPT | Performed by: EMERGENCY MEDICINE

## 2024-08-25 PROCEDURE — 81025 URINE PREGNANCY TEST: CPT | Performed by: EMERGENCY MEDICINE

## 2024-08-25 PROCEDURE — 99223 1ST HOSP IP/OBS HIGH 75: CPT

## 2024-08-25 RX ORDER — BISACODYL 10 MG/1
10 SUPPOSITORY RECTAL DAILY PRN
Status: DISCONTINUED | OUTPATIENT
Start: 2024-08-25 | End: 2024-08-26 | Stop reason: HOSPADM

## 2024-08-25 RX ORDER — GUAIFENESIN 600 MG/1
600 TABLET, EXTENDED RELEASE ORAL EVERY 12 HOURS PRN
Status: DISCONTINUED | OUTPATIENT
Start: 2024-08-25 | End: 2024-08-26 | Stop reason: HOSPADM

## 2024-08-25 RX ORDER — AMLODIPINE BESYLATE 5 MG/1
5 TABLET ORAL DAILY
Status: DISCONTINUED | OUTPATIENT
Start: 2024-08-26 | End: 2024-08-26 | Stop reason: HOSPADM

## 2024-08-25 RX ORDER — GABAPENTIN 300 MG/1
300 CAPSULE ORAL 2 TIMES DAILY
Status: DISCONTINUED | OUTPATIENT
Start: 2024-08-25 | End: 2024-08-26 | Stop reason: HOSPADM

## 2024-08-25 RX ORDER — PANTOPRAZOLE SODIUM 40 MG/10ML
40 INJECTION, POWDER, LYOPHILIZED, FOR SOLUTION INTRAVENOUS
Status: DISCONTINUED | OUTPATIENT
Start: 2024-08-26 | End: 2024-08-26 | Stop reason: HOSPADM

## 2024-08-25 RX ORDER — BISACODYL 5 MG
10 TABLET, DELAYED RELEASE (ENTERIC COATED) ORAL DAILY PRN
Status: DISCONTINUED | OUTPATIENT
Start: 2024-08-25 | End: 2024-08-26 | Stop reason: HOSPADM

## 2024-08-25 RX ORDER — ACETAMINOPHEN 325 MG/1
650 TABLET ORAL EVERY 4 HOURS PRN
Status: DISCONTINUED | OUTPATIENT
Start: 2024-08-25 | End: 2024-08-26 | Stop reason: HOSPADM

## 2024-08-25 RX ORDER — PANTOPRAZOLE SODIUM 40 MG/1
40 TABLET, DELAYED RELEASE ORAL
Status: DISCONTINUED | OUTPATIENT
Start: 2024-08-26 | End: 2024-08-26 | Stop reason: HOSPADM

## 2024-08-25 RX ORDER — ONDANSETRON HYDROCHLORIDE 2 MG/ML
4 INJECTION, SOLUTION INTRAVENOUS EVERY 6 HOURS PRN
Status: DISCONTINUED | OUTPATIENT
Start: 2024-08-25 | End: 2024-08-26 | Stop reason: HOSPADM

## 2024-08-25 RX ORDER — POLYETHYLENE GLYCOL 3350 17 G/17G
17 POWDER, FOR SOLUTION ORAL DAILY
Status: DISCONTINUED | OUTPATIENT
Start: 2024-08-26 | End: 2024-08-26 | Stop reason: HOSPADM

## 2024-08-25 RX ORDER — ESCITALOPRAM OXALATE 10 MG/1
20 TABLET ORAL DAILY
Status: DISCONTINUED | OUTPATIENT
Start: 2024-08-26 | End: 2024-08-26 | Stop reason: HOSPADM

## 2024-08-25 RX ORDER — TALC
3 POWDER (GRAM) TOPICAL NIGHTLY PRN
Status: DISCONTINUED | OUTPATIENT
Start: 2024-08-25 | End: 2024-08-26 | Stop reason: HOSPADM

## 2024-08-25 RX ADMIN — GABAPENTIN 300 MG: 300 CAPSULE ORAL at 21:51

## 2024-08-25 RX ADMIN — Medication 3 MG: at 21:59

## 2024-08-25 SDOH — SOCIAL STABILITY: SOCIAL INSECURITY: HAS ANYONE EVER THREATENED TO HURT YOUR FAMILY OR YOUR PETS?: NO

## 2024-08-25 SDOH — SOCIAL STABILITY: SOCIAL INSECURITY: WERE YOU ABLE TO COMPLETE ALL THE BEHAVIORAL HEALTH SCREENINGS?: YES

## 2024-08-25 SDOH — SOCIAL STABILITY: SOCIAL INSECURITY: DO YOU FEEL UNSAFE GOING BACK TO THE PLACE WHERE YOU ARE LIVING?: NO

## 2024-08-25 SDOH — SOCIAL STABILITY: SOCIAL INSECURITY: HAVE YOU HAD ANY THOUGHTS OF HARMING ANYONE ELSE?: NO

## 2024-08-25 SDOH — SOCIAL STABILITY: SOCIAL INSECURITY: ABUSE: ADULT

## 2024-08-25 SDOH — SOCIAL STABILITY: SOCIAL INSECURITY: ARE YOU OR HAVE YOU BEEN THREATENED OR ABUSED PHYSICALLY, EMOTIONALLY, OR SEXUALLY BY ANYONE?: NO

## 2024-08-25 SDOH — SOCIAL STABILITY: SOCIAL INSECURITY: DO YOU FEEL ANYONE HAS EXPLOITED OR TAKEN ADVANTAGE OF YOU FINANCIALLY OR OF YOUR PERSONAL PROPERTY?: NO

## 2024-08-25 SDOH — SOCIAL STABILITY: SOCIAL INSECURITY: DOES ANYONE TRY TO KEEP YOU FROM HAVING/CONTACTING OTHER FRIENDS OR DOING THINGS OUTSIDE YOUR HOME?: NO

## 2024-08-25 SDOH — SOCIAL STABILITY: SOCIAL INSECURITY: HAVE YOU HAD THOUGHTS OF HARMING ANYONE ELSE?: NO

## 2024-08-25 SDOH — SOCIAL STABILITY: SOCIAL INSECURITY: ARE THERE ANY APPARENT SIGNS OF INJURIES/BEHAVIORS THAT COULD BE RELATED TO ABUSE/NEGLECT?: NO

## 2024-08-25 ASSESSMENT — PAIN SCALES - GENERAL
PAINLEVEL_OUTOF10: 0 - NO PAIN
PAINLEVEL_OUTOF10: 0 - NO PAIN
PAINLEVEL_OUTOF10: 7

## 2024-08-25 ASSESSMENT — LIFESTYLE VARIABLES
EVER FELT BAD OR GUILTY ABOUT YOUR DRINKING: NO
AUDIT-C TOTAL SCORE: 1
HOW MANY STANDARD DRINKS CONTAINING ALCOHOL DO YOU HAVE ON A TYPICAL DAY: PATIENT DOES NOT DRINK
TOTAL SCORE: 0
EVER FELT BAD OR GUILTY ABOUT YOUR DRINKING: NO
HAVE PEOPLE ANNOYED YOU BY CRITICIZING YOUR DRINKING: NO
SKIP TO QUESTIONS 9-10: 1
HAVE PEOPLE ANNOYED YOU BY CRITICIZING YOUR DRINKING: NO
HAVE YOU EVER FELT YOU SHOULD CUT DOWN ON YOUR DRINKING: NO
HAVE YOU EVER FELT YOU SHOULD CUT DOWN ON YOUR DRINKING: NO
EVER HAD A DRINK FIRST THING IN THE MORNING TO STEADY YOUR NERVES TO GET RID OF A HANGOVER: NO
HOW OFTEN DO YOU HAVE A DRINK CONTAINING ALCOHOL: MONTHLY OR LESS
HOW OFTEN DO YOU HAVE 6 OR MORE DRINKS ON ONE OCCASION: NEVER
AUDIT-C TOTAL SCORE: 1
EVER HAD A DRINK FIRST THING IN THE MORNING TO STEADY YOUR NERVES TO GET RID OF A HANGOVER: NO
TOTAL SCORE: 0

## 2024-08-25 ASSESSMENT — COLUMBIA-SUICIDE SEVERITY RATING SCALE - C-SSRS
6. HAVE YOU EVER DONE ANYTHING, STARTED TO DO ANYTHING, OR PREPARED TO DO ANYTHING TO END YOUR LIFE?: NO
1. IN THE PAST MONTH, HAVE YOU WISHED YOU WERE DEAD OR WISHED YOU COULD GO TO SLEEP AND NOT WAKE UP?: NO
2. HAVE YOU ACTUALLY HAD ANY THOUGHTS OF KILLING YOURSELF?: NO
1. IN THE PAST MONTH, HAVE YOU WISHED YOU WERE DEAD OR WISHED YOU COULD GO TO SLEEP AND NOT WAKE UP?: NO
2. HAVE YOU ACTUALLY HAD ANY THOUGHTS OF KILLING YOURSELF?: NO
6. HAVE YOU EVER DONE ANYTHING, STARTED TO DO ANYTHING, OR PREPARED TO DO ANYTHING TO END YOUR LIFE?: NO

## 2024-08-25 ASSESSMENT — PAIN - FUNCTIONAL ASSESSMENT
PAIN_FUNCTIONAL_ASSESSMENT: 0-10

## 2024-08-25 ASSESSMENT — ACTIVITIES OF DAILY LIVING (ADL)
FEEDING YOURSELF: INDEPENDENT
DRESSING YOURSELF: INDEPENDENT
ADEQUATE_TO_COMPLETE_ADL: YES
TOILETING: INDEPENDENT
LACK_OF_TRANSPORTATION: NO
HEARING - LEFT EAR: FUNCTIONAL
BATHING: INDEPENDENT
JUDGMENT_ADEQUATE_SAFELY_COMPLETE_DAILY_ACTIVITIES: YES
WALKS IN HOME: INDEPENDENT
GROOMING: INDEPENDENT
PATIENT'S MEMORY ADEQUATE TO SAFELY COMPLETE DAILY ACTIVITIES?: YES
HEARING - RIGHT EAR: FUNCTIONAL

## 2024-08-25 ASSESSMENT — ENCOUNTER SYMPTOMS
TREMORS: 0
SHORTNESS OF BREATH: 0
NAUSEA: 0
WOUND: 0
FATIGUE: 0
DIARRHEA: 0
CONSTIPATION: 0
COUGH: 0
ABDOMINAL PAIN: 0
JOINT SWELLING: 0
BACK PAIN: 0
WHEEZING: 0
CHEST TIGHTNESS: 0
VOMITING: 0
FEVER: 0
PALPITATIONS: 0
HEADACHES: 0
WEAKNESS: 0
CHILLS: 0
FLANK PAIN: 0
SEIZURES: 1
HEMATURIA: 0

## 2024-08-25 ASSESSMENT — COGNITIVE AND FUNCTIONAL STATUS - GENERAL
MOBILITY SCORE: 22
DAILY ACTIVITIY SCORE: 24
MOBILITY SCORE: 22
CLIMB 3 TO 5 STEPS WITH RAILING: A LOT
PATIENT BASELINE BEDBOUND: NO
CLIMB 3 TO 5 STEPS WITH RAILING: A LOT
DAILY ACTIVITIY SCORE: 24

## 2024-08-25 ASSESSMENT — PAIN DESCRIPTION - DESCRIPTORS: DESCRIPTORS: ACHING

## 2024-08-25 ASSESSMENT — PATIENT HEALTH QUESTIONNAIRE - PHQ9
SUM OF ALL RESPONSES TO PHQ9 QUESTIONS 1 & 2: 0
1. LITTLE INTEREST OR PLEASURE IN DOING THINGS: NOT AT ALL
2. FEELING DOWN, DEPRESSED OR HOPELESS: NOT AT ALL

## 2024-08-25 ASSESSMENT — PAIN DESCRIPTION - LOCATION: LOCATION: HEAD

## 2024-08-25 NOTE — ED PROVIDER NOTES
HPI   Chief Complaint   Patient presents with    Seizures     Reported 3-4 minute seizure at home per boyfriend. Hx of seizures. No medications for seizures. Per EMS A+Ox 0 when they got there.  A+O x4 upon arrival. Denies n/v after seizure. Afebrile.       Patient presents emergency department for seizure.  Patient was to have a seizure last approximately 4 to 5 minutes witnessed by boyfriend.  Reported as tonic-clonic.  Patient and boyfriend were reported as being bad historians.  No reported medications given to abort the seizure.  Here patient has no complaints.  She appears confused.  Does not have recollection of what happened.  States she last remembers being with her boyfriend and then riding an ambulance.  Patient is unclear what happened in the in between time.              Patient History   Past Medical History:   Diagnosis Date    Personal history of other diseases of the circulatory system     History of SBE (subacute bacterial endocarditis)    Unspecified cataract     Cataracts, both eyes     Past Surgical History:   Procedure Laterality Date    OTHER SURGICAL HISTORY  07/21/2022    No history of surgery     Family History   Problem Relation Name Age of Onset    Breast cancer Mother      Colon cancer Father      Allergies Other       Social History     Tobacco Use    Smoking status: Never     Passive exposure: Never    Smokeless tobacco: Never   Vaping Use    Vaping status: Never Used   Substance Use Topics    Alcohol use: Yes     Alcohol/week: 4.0 standard drinks of alcohol     Types: 4 Standard drinks or equivalent per week     Comment: 2-4 of wine 0nce or twice a week    Drug use: Never       Physical Exam   ED Triage Vitals [08/25/24 1203]   Temp Heart Rate Respirations BP   -- 96 15 124/90      Pulse Ox Temp src Heart Rate Source Patient Position   98 % -- -- --      BP Location FiO2 (%)     -- --       Physical Exam  Vitals and nursing note reviewed.   Constitutional:       General: She is not in  acute distress.     Appearance: She is well-developed.   HENT:      Head: Normocephalic and atraumatic.      Right Ear: External ear normal.      Left Ear: External ear normal.      Nose: Nose normal.      Mouth/Throat:      Mouth: Mucous membranes are moist.      Pharynx: Oropharynx is clear.   Eyes:      Extraocular Movements: Extraocular movements intact.      Conjunctiva/sclera: Conjunctivae normal.   Neck:      Comments: Trachea midline  Cardiovascular:      Rate and Rhythm: Normal rate.      Pulses: Normal pulses.   Pulmonary:      Effort: Pulmonary effort is normal. No respiratory distress.      Breath sounds: Normal breath sounds.   Abdominal:      General: Bowel sounds are normal.      Palpations: Abdomen is soft.      Tenderness: There is no abdominal tenderness.   Musculoskeletal:         General: No swelling or tenderness.      Cervical back: No tenderness.   Skin:     General: Skin is warm and dry.      Capillary Refill: Capillary refill takes less than 2 seconds.   Neurological:      General: No focal deficit present.      Mental Status: She is alert and oriented to person, place, and time.   Psychiatric:         Mood and Affect: Mood normal.         Thought Content: Thought content does not include homicidal or suicidal ideation.           ED Course & MDM   ED Course as of 08/25/24 1448   Sun Aug 25, 2024   1205 Patient presents with seizure-like activity. Available chart reviewed. On initial assessment the patient was found non-toxic, no acute distress, vitals hemodynamically stable and afebrile. Initial concern for seizure, electrolyte abnormality, urinary tract infection. [JH]   1210 Discharge summary from 4 2 was reviewed showing patient's workup for seizures with EEG showing no OptiForm movements and when nonspecific MRI changes.  Note reads that the patient likely suffers from psychogenic nonepileptic seizures. [JH]   1217 EKG obtained at 1201 interpreted by myself shows normal sinus rhythm,  rate 94, , QRS is 87 prolonged QTc of 512, no ST segment changes, no T wave changes []   1445 Urinalysis with Reflex Microscopic(!)  Urinalysis is not concerning for infection []   1445 PHOSPHORUS(!): 2.1  Phosphorus slightly low 2.1 [JH]   1445 MAGNESIUM: 1.65 [JH]   1445 HCG, Urine: NEGATIVE []   1445 Comprehensive metabolic panel  Metabolic panel was normal kidney and liver and electrolytes []   1445 CBC and Auto Differential(!)  CBC is normal. []   1445 Had a long discussion with the patient.  Patient recently went back to work last week, this may contribute to her presentation.  It was learned from family and boyfriend at bedside that the patient had a migraine headache and aura just before this seizure-like activity.  Patient has not followed up with neurology yet.  Will refer to neurology here.  Patient has appointment tomorrow to provider and told to discuss with them tomorrow.    No indication for admission.  Discussed findings and diagnosis with the patient, follow-up and return to ED precautions given, patient voiced understanding, agrees with plan, questions answered, patient was discharged in stable condition. []      ED Course User Index  [] Davis Anderson MD         Diagnoses as of 08/25/24 1448   Psychogenic nonepileptic seizure   Hypophosphatemia                 No data recorded     [place]et Coma Scale Score: 15 (08/25/24 1212 : Tammie Mccullough RN)                           Medical Decision Making      Procedure  Procedures     Davis Anderson MD  08/25/24 [id number]

## 2024-08-26 ENCOUNTER — APPOINTMENT (OUTPATIENT)
Dept: BEHAVIORAL HEALTH | Facility: CLINIC | Age: 39
End: 2024-08-26
Payer: COMMERCIAL

## 2024-08-26 VITALS
WEIGHT: 135 LBS | HEART RATE: 73 BPM | RESPIRATION RATE: 16 BRPM | HEIGHT: 63 IN | BODY MASS INDEX: 23.92 KG/M2 | DIASTOLIC BLOOD PRESSURE: 80 MMHG | SYSTOLIC BLOOD PRESSURE: 121 MMHG | OXYGEN SATURATION: 97 % | TEMPERATURE: 98 F

## 2024-08-26 LAB
ALBUMIN SERPL BCP-MCNC: 4.3 G/DL (ref 3.4–5)
ALP SERPL-CCNC: 55 U/L (ref 33–110)
ALT SERPL W P-5'-P-CCNC: 8 U/L (ref 7–45)
ANION GAP SERPL CALC-SCNC: 13 MMOL/L (ref 10–20)
AST SERPL W P-5'-P-CCNC: 23 U/L (ref 9–39)
BILIRUB SERPL-MCNC: 0.8 MG/DL (ref 0–1.2)
BUN SERPL-MCNC: 12 MG/DL (ref 6–23)
CALCIUM SERPL-MCNC: 8.4 MG/DL (ref 8.6–10.3)
CHLORIDE SERPL-SCNC: 102 MMOL/L (ref 98–107)
CO2 SERPL-SCNC: 22 MMOL/L (ref 21–32)
CREAT SERPL-MCNC: 0.61 MG/DL (ref 0.5–1.05)
EGFRCR SERPLBLD CKD-EPI 2021: >90 ML/MIN/1.73M*2
ERYTHROCYTE [DISTWIDTH] IN BLOOD BY AUTOMATED COUNT: 14.3 % (ref 11.5–14.5)
GLUCOSE SERPL-MCNC: 82 MG/DL (ref 74–99)
HCT VFR BLD AUTO: 35.9 % (ref 36–46)
HGB BLD-MCNC: 11.8 G/DL (ref 12–16)
MCH RBC QN AUTO: 32.1 PG (ref 26–34)
MCHC RBC AUTO-ENTMCNC: 32.9 G/DL (ref 32–36)
MCV RBC AUTO: 98 FL (ref 80–100)
NRBC BLD-RTO: 0 /100 WBCS (ref 0–0)
PLATELET # BLD AUTO: 283 X10*3/UL (ref 150–450)
POTASSIUM SERPL-SCNC: 3.6 MMOL/L (ref 3.5–5.3)
PROT SERPL-MCNC: 6.7 G/DL (ref 6.4–8.2)
RBC # BLD AUTO: 3.68 X10*6/UL (ref 4–5.2)
SODIUM SERPL-SCNC: 133 MMOL/L (ref 136–145)
WBC # BLD AUTO: 6.6 X10*3/UL (ref 4.4–11.3)

## 2024-08-26 PROCEDURE — G0378 HOSPITAL OBSERVATION PER HR: HCPCS

## 2024-08-26 PROCEDURE — 36415 COLL VENOUS BLD VENIPUNCTURE: CPT

## 2024-08-26 PROCEDURE — 99223 1ST HOSP IP/OBS HIGH 75: CPT | Performed by: PSYCHIATRY & NEUROLOGY

## 2024-08-26 PROCEDURE — 99239 HOSP IP/OBS DSCHRG MGMT >30: CPT | Performed by: INTERNAL MEDICINE

## 2024-08-26 PROCEDURE — 2500000001 HC RX 250 WO HCPCS SELF ADMINISTERED DRUGS (ALT 637 FOR MEDICARE OP)

## 2024-08-26 PROCEDURE — 85027 COMPLETE CBC AUTOMATED: CPT

## 2024-08-26 PROCEDURE — 96372 THER/PROPH/DIAG INJ SC/IM: CPT | Performed by: INTERNAL MEDICINE

## 2024-08-26 PROCEDURE — 2500000004 HC RX 250 GENERAL PHARMACY W/ HCPCS (ALT 636 FOR OP/ED): Performed by: INTERNAL MEDICINE

## 2024-08-26 PROCEDURE — 80053 COMPREHEN METABOLIC PANEL: CPT

## 2024-08-26 RX ORDER — ENOXAPARIN SODIUM 100 MG/ML
40 INJECTION SUBCUTANEOUS DAILY
Status: DISCONTINUED | OUTPATIENT
Start: 2024-08-26 | End: 2024-08-26 | Stop reason: HOSPADM

## 2024-08-26 RX ORDER — FLUTICASONE FUROATE AND VILANTEROL 100; 25 UG/1; UG/1
1 POWDER RESPIRATORY (INHALATION)
Status: DISCONTINUED | OUTPATIENT
Start: 2024-08-26 | End: 2024-08-26 | Stop reason: HOSPADM

## 2024-08-26 RX ADMIN — AMLODIPINE BESYLATE 5 MG: 5 TABLET ORAL at 09:56

## 2024-08-26 RX ADMIN — ENOXAPARIN SODIUM 40 MG: 40 INJECTION SUBCUTANEOUS at 12:56

## 2024-08-26 RX ADMIN — ACETAMINOPHEN 650 MG: 325 TABLET ORAL at 09:56

## 2024-08-26 RX ADMIN — ACETAMINOPHEN 650 MG: 325 TABLET ORAL at 00:38

## 2024-08-26 RX ADMIN — GABAPENTIN 300 MG: 300 CAPSULE ORAL at 09:56

## 2024-08-26 RX ADMIN — ESCITALOPRAM OXALATE 20 MG: 10 TABLET ORAL at 09:55

## 2024-08-26 ASSESSMENT — COGNITIVE AND FUNCTIONAL STATUS - GENERAL
MOBILITY SCORE: 22
WALKING IN HOSPITAL ROOM: A LITTLE
DAILY ACTIVITIY SCORE: 23
TOILETING: A LITTLE
CLIMB 3 TO 5 STEPS WITH RAILING: A LITTLE

## 2024-08-26 ASSESSMENT — PAIN DESCRIPTION - LOCATION: LOCATION: FOOT

## 2024-08-26 ASSESSMENT — PAIN - FUNCTIONAL ASSESSMENT: PAIN_FUNCTIONAL_ASSESSMENT: 0-10

## 2024-08-26 ASSESSMENT — PAIN SCALES - GENERAL
PAINLEVEL_OUTOF10: 4
PAINLEVEL_OUTOF10: 6

## 2024-08-26 ASSESSMENT — PAIN DESCRIPTION - ORIENTATION: ORIENTATION: RIGHT

## 2024-08-26 NOTE — DISCHARGE INSTRUCTIONS
Please excuse Carie Mckinnon from work on August 26th due to hospitalization.  OK to resume work without restriction on August 27th.

## 2024-08-26 NOTE — DISCHARGE SUMMARY
"  Admission Date: 8/25/2024     Discharge Date: 8/26/2024  Current Planned Discharge Disposition:    Primary Care Physician at Discharge: Evelyne Millard [phone]       Discharge Diagnosis  Seizure like activity    Hospital Course  Carie Mckinnon \"Susan" is a 38 y.o. female with PMHx s/f seizures, celiac disease, atopic dermatitis on Rinvoq, PTSD, anxiety, and asthma presenting with seizure. She states she does not have recollections of what happened. Boyfriend states she had two episodes of seizures today, one around 11 am and another around 5:30 pm. He states the first one happened when she was lying on his lap on the bed when she started having migraine auras, almost dream-like cristian-vu moments and became stiffening and had convulsions. He notes it lasts for a few minutes. He called EMS and brought into ED. ED evaluated her without significant findings and discharged her to follow up with a neurology outpatient. She was brought in again due to her second seizure this evening. The second one happened when they were watching TV, she stared off into space and was unresponsive and also notes to be lasting few minutes. She had history of seizures in the past. Used to take keppra few years ago, stopped because it made her sick. No current medications for seizures.      [place] [facility] [place] and had a negative EEG and MRI.  The pt is aware that these have been called non-epileptogenic seizures      ED Course (Summary):   Vitals on presentation: 98.8F, 90 bpm, 16 RR, 122/88, 99% on RA  Labs: CMP, CBC unremarkable  UA negative  Imaging: none  Interventions: none    Observed on the floor and no further seizure activity was witnessed.  She was seen by Dr Smith of Neurology and there was a consideration for starting depakote or topiramate for migraine with possible improvement in seizure activity but the pt stated her migraines are fairly well controlled and did not wish to start a new medication.  Pt is " DC home with Neuro/Epilepsy referral    Medication Changes  none    Important Follow Ups  Referral placed to Epileptology    Discharge Meds     Your medication list        CONTINUE taking these medications        Instructions Last Dose Given Next Dose Due   albuterol 90 mcg/actuation inhaler           amLODIPine 5 mg tablet  Commonly known as: Norvasc      Take 1 tablet (5 mg) by mouth once daily.       busPIRone 7.5 mg tablet  Commonly known as: Buspar      Take 1 tablet (7.5 mg) by mouth 2 times a day.       escitalopram 20 mg tablet  Commonly known as: Lexapro      Take 1 tablet (20 mg) by mouth once daily.       fluticasone 50 mcg/actuation nasal spray  Commonly known as: Flonase      Administer 1 spray into each nostril once daily.       fluticasone propion-salmeteroL 100-50 mcg/dose diskus inhaler  Commonly known as: Advair Diskus      INHALE 1 PUFF TWICE DAILY. rinse mouth with water after use to reduce aftertaste and incidence OF candidiasis. do not swallow.       gabapentin 300 mg capsule  Commonly known as: Neurontin      Take 1 capsule (300 mg) by mouth 2 times a day.       Rinvoq 15 mg tablet extended release 24 hr  Generic drug: upadacitinib ER      TAKE 1 TABLET BY MOUTH 1 TIME A DAY.       tacrolimus 0.1 % ointment  Commonly known as: Protopic      APPLY TWICE DAILY topically to the areas OF eczema                Test Results Pending At Discharge  Pending Labs       No current pending labs.

## 2024-08-26 NOTE — CARE PLAN
The patient's goals for the shift include      Problem: Fall/Injury  Goal: Not fall by end of shift  Outcome: Progressing  Goal: Be free from injury by end of the shift  Outcome: Progressing  Goal: Verbalize understanding of personal risk factors for fall in the hospital  Outcome: Progressing  Goal: Verbalize understanding of risk factor reduction measures to prevent injury from fall in the home  Outcome: Progressing  Goal: Use assistive devices by end of the shift  Outcome: Progressing  Goal: Pace activities to prevent fatigue by end of the shift  Outcome: Progressing     Problem: Pain  Goal: Takes deep breaths with improved pain control throughout the shift  Outcome: Progressing  Goal: Turns in bed with improved pain control throughout the shift  Outcome: Progressing  Goal: Walks with improved pain control throughout the shift  Outcome: Progressing  Goal: Performs ADL's with improved pain control throughout shift  Outcome: Progressing  Goal: Participates in PT with improved pain control throughout the shift  Outcome: Progressing  Goal: Free from opioid side effects throughout the shift  Outcome: Progressing  Goal: Free from acute confusion related to pain meds throughout the shift  Outcome: Progressing     Problem: Seizure  Goal: I will remain free from seizures  Outcome: Progressing       The clinical goals for the shift include no seizure activity throughout shift.    See assessment and mar. Remains on room air. See  assessment and mar. Pending neuro to see pt.

## 2024-08-26 NOTE — ED PROVIDER NOTES
HPI   Chief Complaint   Patient presents with    Seizures       HPI  Patient is a 38-year-old female presents emergency department for seizure-like activity.  Patient with history of psychogenic nonepileptic seizures.  Was seen earlier in the emergency department today for a seizure and discharged home.  Patient's boyfriend states that at home they were laying on the couch when she had full body convulsions, was foaming at the mouth, started turning blue.  The episode lasted less than 3 minutes and she was starting to come to by the time EMS arrived.  She does not take any medications for seizures.  Otherwise has been feeling well.  Patient recently underwent surgery on right foot and is returning back to work soon.  Denies fevers, chills, nausea vomit, chest pain, shortness of breath.      Patient History   Past Medical History:   Diagnosis Date    Personal history of other diseases of the circulatory system     History of SBE (subacute bacterial endocarditis)    Unspecified cataract     Cataracts, both eyes     Past Surgical History:   Procedure Laterality Date    OTHER SURGICAL HISTORY  07/21/2022    No history of surgery     Family History   Problem Relation Name Age of Onset    Breast cancer Mother      Colon cancer Father      Allergies Other       Social History     Tobacco Use    Smoking status: Never     Passive exposure: Never    Smokeless tobacco: Never   Vaping Use    Vaping status: Never Used   Substance Use Topics    Alcohol use: Yes     Alcohol/week: 4.0 standard drinks of alcohol     Types: 4 Standard drinks or equivalent per week     Comment: 2-4 of wine 0nce or twice a week    Drug use: Never       Physical Exam   ED Triage Vitals   Temperature Heart Rate Respirations BP   08/25/24 1838 08/25/24 1838 08/25/24 1838 08/25/24 1838   37.1 °C (98.8 °F) 90 16 122/88      Pulse Ox Temp src Heart Rate Source Patient Position   08/25/24 1838 -- 08/25/24 2100 --   98 %  Monitor       BP Location FiO2 (%)      -- --             Physical Exam  General: Well-developed, well-nourished patient lying in bed who appears non-toxic.  Head: Atraumatic, normocephalic.  Eyes: Sclera anicteric.  ENT: Mucous membranes moist.  Heart: Regular rate and rhythm.  Lungs: Clear to auscultation bilaterally.  Normal respiratory pattern without conversational dyspnea or respiratory distress.  Abdomen: Soft, non-tender, non-distended, no guarding or peritoneal signs.  Neurologic: Awake and alert, normal speech and mental status. Moves all extremities equally well. No focal deficits or lateralizing signs.  Psychiatric: Mood and affect appropriate.  Skin: Warm and dry, no appreciable rash.  Musculoskeletal: No peripheral edema. No signs of DVT.      ED Course & MDM   Diagnoses as of 08/25/24 2236   Seizure-like activity (Multi)                 No data recorded     [place] Coma Scale Score: 15 (08/25/24 1840 : Noah Hernandez RN)                           Medical Decision Making  Patient is a 38 y.o. female who presents to the emergency department via EMS with chief complaint of seizure-like activity. History of present illness as above. Chronic conditions impacting care include psychogenic nonepileptic seizures. Patient remained afebrile and hemodynamically stable while in the emergency department. They saturated well on room air. The differential diagnosis associated with this patient's presentation includes stress disorder, psychogenic nonepileptic seizures, epileptic seizures.  Reviewed labs from earlier today including CBC, CMP, UA, EKG.    As patient was second episode of seizure-like activity today, engage in shared decision-making with the patient regarding discharge home with outpatient follow-up with neurology versus admission to the hospital.  Patient preferring to be admitted to the hospital to be seen by neuro as she has been lost to follow-up as an outpatient.  Discussed the patient with the inpatient team and she was admitted to  IM[facility].      Diagnoses as of 08/25/24 2236   Seizure-like activity (Multi)         Social determinants of health affecting care:  None    ED Medications managed:  Medications   amLODIPine (Norvasc) tablet 5 mg (has no administration in time range)   escitalopram (Lexapro) tablet 20 mg (has no administration in time range)   gabapentin (Neurontin) capsule 300 mg (300 mg oral Given 8/25/24 2151)   pantoprazole (ProtoNix) EC tablet 40 mg (has no administration in time range)     Or   pantoprazole (ProtoNix) injection 40 mg (has no administration in time range)   melatonin tablet 3 mg (3 mg oral Given 8/25/24 2159)   polyethylene glycol (Glycolax, Miralax) packet 17 g (has no administration in time range)   bisacodyl (Dulcolax) EC tablet 10 mg (has no administration in time range)   bisacodyl (Dulcolax) suppository 10 mg (has no administration in time range)   guaiFENesin (Mucinex) 12 hr tablet 600 mg (has no administration in time range)   acetaminophen (Tylenol) tablet 650 mg (has no administration in time range)   ondansetron (Zofran) injection 4 mg (has no administration in time range)         Procedure  Procedures     Johnathon Meza MD  08/25/24 2236

## 2024-08-26 NOTE — CONSULTS
"History Of Present Illness  Carie Mckinnon \"Sherin\" is a 38 y.o. female right handed admitted to [facility] on 8/25/24 presenting with seizure-like activity. Neurology consulted for above.    Inpatient consult to Neurology  Consult performed by: Riky Smith MD  Consult ordered by: Dusty Mitchell PA-C      Listed history of anxiety, seizure-like activity felt to be due to PNES, celiac disease, atopic dermatitis, PTSD, asthma.    Patient seen this afternoon, parents at the bedside.    Patient lives by herself.  Yesterday, boyfriend was with her, when she was noted to have seizure-like activity.  She was watching TV, had like a dreamlike déjà vu episode.  She was then noted to have arm stiffening on 1 side, began convulsing, and then left arm stiffening on the other and began convulsing.  She was having some foaming in the mouth, turned blue, and she describes she was not aware.  She was evaluated at [facility] ED for this, and was discharged home.  Later in the afternoon, the same thing happened, and she was brought back to [facility] ED again for reevaluation and was subsequently admitted.  Patient did not even have head imaging done.  Basic labs done were unremarkable.  Urinalysis did not show any signs of UTI.  Neurology was consulted.    Patient recently had evaluation at [facility] end of March 2024 for similar seizure-like activity.  Patient states her EEG monitoring was \"inconclusive\".  She did not have any episodes, and there were no abnormalities that were found (diffuse encephalopathy was noted, no seizure, no epileptiform discharges were reported).  Psychiatry was consulted, and she was advised outpatient neuropsych testing, psychiatry/psychology follow-up, and epilepsy follow-up.  She had to cancel her epilepsy follow-up scheduled in July 2024 because she had surgery in her foot.  She was rescheduled, but turns out she was rescheduled with another neurologist who is a movement disorders specialist.  That appointment had " since been canceled.  She has not rescheduled with epilepsy again.  She has been following with psychiatrist, who had since started her on escitalopram.    In other history, before 2020, when she was in [place], she saw a neurologist for similar seizure-like activity.  Parents tell me that she brought home EEG monitoring (?  Ambulatory EEG monitoring) for a few days, which was inconclusive.  She did not have any episodes during the monitoring, and her EEG did not show any abnormalities.  In 2020, she had a telehealth visit with her [facility] neurologist.  Patient and parent states she did not have any monitoring or any testing done.  She did not have any episodes from 2020 up until March 2024 this year, which led to her monitoring at  epilepsy.    Of note, she recently had right foot surgery.  She was discharged with Norco as pain medication (not tramadol).    She recently went back to work.  Denies any more stress than usual.    She does have some nonspecific headaches, mostly pressure, happens a few times a month.  She feels this at the top of the mouth/back of the eye.  She does have some sound and light sensitivity with it.  Denies nausea.  Denies vomiting.  She takes ibuprofen and some caffeine with it and it improves.  She denies any need for further help with medications with these headaches.  She denies any smoking history.  Occasional alcohol use 1-2 times a week.  Denies street drug use.      Review of Systems   Constitutional:  Negative for appetite change, chills and fever.   HENT:  Negative for ear pain and nosebleeds.    Eyes:  Negative for discharge and itching.   Respiratory:  Negative for choking and chest tightness.    Cardiovascular:  Negative for chest pain and palpitations.   Gastrointestinal:  Negative for abdominal distention, abdominal pain and nausea.   Endocrine: Negative for cold intolerance and heat intolerance.   Genitourinary:  Negative for difficulty urinating and dysuria.    Musculoskeletal:  Negative for gait problem and myalgias.   Neurological:  Negative for dizziness, and numbness.     Past Medical History  Past Medical History:   Diagnosis Date    Personal history of other diseases of the circulatory system     History of SBE (subacute bacterial endocarditis)    Unspecified cataract     Cataracts, both eyes       Surgical History  Past Surgical History:   Procedure Laterality Date    OTHER SURGICAL HISTORY  07/21/2022    No history of surgery       Social History  Social History     Tobacco Use    Smoking status: Never     Passive exposure: Never    Smokeless tobacco: Never   Vaping Use    Vaping status: Never Used   Substance Use Topics    Alcohol use: Yes     Alcohol/week: 4.0 standard drinks of alcohol     Types: 4 Standard drinks or equivalent per week     Comment: 2-4 of wine 0nce or twice a week    Drug use: Never       Home Meds  Medications Prior to Admission   Medication Sig Dispense Refill Last Dose    Rinvoq 15 mg tablet extended release 24 hr TAKE 1 TABLET BY MOUTH 1 TIME A DAY. 30 tablet 11 8/25/2024    albuterol 90 mcg/actuation inhaler Inhale 1 puff every 6 hours if needed.       amLODIPine (Norvasc) 5 mg tablet Take 1 tablet (5 mg) by mouth once daily. 30 tablet 5     busPIRone (Buspar) 7.5 mg tablet Take 1 tablet (7.5 mg) by mouth 2 times a day. 60 tablet 0     escitalopram (Lexapro) 20 mg tablet Take 1 tablet (20 mg) by mouth once daily. 30 tablet 2     fluticasone (Flonase) 50 mcg/actuation nasal spray Administer 1 spray into each nostril once daily. 16 g 0     fluticasone propion-salmeteroL (Advair Diskus) 100-50 mcg/dose diskus inhaler INHALE 1 PUFF TWICE DAILY. rinse mouth with water after use to reduce aftertaste and incidence OF candidiasis. do not swallow. 60 each 1     gabapentin (Neurontin) 300 mg capsule Take 1 capsule (300 mg) by mouth 2 times a day. 60 capsule 6     tacrolimus (Protopic) 0.1 % ointment APPLY TWICE DAILY topically to the areas OF  "eczema 30 g 6        Allergies  Other, Peanut, Fish containing products, Gluten protein, Penicillins, Soy, Tree nut, Corticosteroids (glucocorticoids), and Prednisone    Current Meds  Scheduled medications  amLODIPine, 5 mg, oral, Daily  enoxaparin, 40 mg, subcutaneous, Daily  escitalopram, 20 mg, oral, Daily  fluticasone furoate-vilanteroL, 1 puff, inhalation, Daily  gabapentin, 300 mg, oral, BID  pantoprazole, 40 mg, oral, Daily before breakfast   Or  pantoprazole, 40 mg, intravenous, Daily before breakfast  polyethylene glycol, 17 g, oral, Daily      Continuous medications     PRN medications  PRN medications: acetaminophen, bisacodyl, bisacodyl, guaiFENesin, melatonin, ondansetron    Last Recorded Vitals  Blood pressure 121/80, pulse 73, temperature 36.7 °C (98 °F), temperature source Temporal, resp. rate 16, height 1.6 m (5' 3\"), weight 61.2 kg (135 lb), SpO2 97%, not currently breastfeeding.    Awake, alert, oriented x3, in no distress  Well-nourished, ambulatory independently  No leg edema    Supple neck    Mental status exam as above, conversant   Fair fund of knowledge  Recent/remote memory fair  Fair attention span  Pupils round reactive to light, 3-4 mm, (-) RAPD   Fundoscopic examination was attempted but fundus was not visualized bilaterally   Full EOMs intact, no nystagmus, no ptosis   V1 to V3 sensation is intact   No facial droop   Hearing grossly intact   No dysarthria  Good shoulder shrug bilaterally   Tongue is midline     Motor strength is 5/5 on all extremities except R foot not examined (in a boot), tone/bulk normal   Reflexes 1+ on all 4 extremities (except R ankle not examined), downgoing toe on L  Sensation is intact to light touch, vibration on all 4 extremities   Finger to nose test intact bilaterally   I did not have her stand or walk    Relevant Results  I have personally reviewed the following:    Labs  Results for orders placed or performed during the hospital encounter of 08/25/24 " (from the past 24 hour(s))   Comprehensive Metabolic Panel   Result Value Ref Range    Glucose 82 74 - 99 mg/dL    Sodium 133 (L) 136 - 145 mmol/L    Potassium 3.6 3.5 - 5.3 mmol/L    Chloride 102 98 - 107 mmol/L    Bicarbonate 22 21 - 32 mmol/L    Anion Gap 13 10 - 20 mmol/L    Urea Nitrogen 12 6 - 23 mg/dL    Creatinine 0.61 0.50 - 1.05 mg/dL    eGFR >90 >60 mL/min/1.73m*2    Calcium 8.4 (L) 8.6 - 10.3 mg/dL    Albumin 4.3 3.4 - 5.0 g/dL    Alkaline Phosphatase 55 33 - 110 U/L    Total Protein 6.7 6.4 - 8.2 g/dL    AST 23 9 - 39 U/L    Bilirubin, Total 0.8 0.0 - 1.2 mg/dL    ALT 8 7 - 45 U/L   CBC   Result Value Ref Range    WBC 6.6 4.4 - 11.3 x10*3/uL    nRBC 0.0 0.0 - 0.0 /100 WBCs    RBC 3.68 (L) 4.00 - 5.20 x10*6/uL    Hemoglobin 11.8 (L) 12.0 - 16.0 g/dL    Hematocrit 35.9 (L) 36.0 - 46.0 %    MCV 98 80 - 100 fL    MCH 32.1 26.0 - 34.0 pg    MCHC 32.9 32.0 - 36.0 g/dL    RDW 14.3 11.5 - 14.5 %    Platelets 283 150 - 450 x10*3/uL       Imaging results  MR brain w and wo IV contrast    Result Date: 4/1/2024  Interpreted By:  Franc Hou, STUDY: MR BRAIN W AND WO IV CONTRAST; ;  4/1/2024 1:48 am   INDICATION: Signs/Symptoms:Encephalopathy, L temporal FLAIR hyperintensity, possible seizures.   COMPARISON: MRI brain from 03/30/2024.   ACCESSION NUMBER(S): [id number]   ORDERING CLINICIAN: MARCELINA TORRES   TECHNIQUE: MRI of the brain was performed with the acquisition of axial diffusion-weighted, axial FLAIR, axial T1, axial T2 gradient echo, axial T2 fat saturated, sagittal T1 MP-RAGE with multiplanar reformats, axial T1 fat saturated postcontrast sequence, and volumetric axial T1 weighted postcontrast sequence with multiplanar reformats.   Contrast: 13 mL of Dotarem was injected intravenously.   FINDINGS: There is no acute intracranial hemorrhage or infarct. There is no abnormal extra-axial fluid collection or mass effect. The ventricles, sulci, and basilar cisterns are normal in size, shape, and configuration  for patient's age. There are few scattered foci of T2 hyperintensity within the cerebral hemispheric white matter including within the left periatrial white matter which are nonspecific.   There is no signal abnormality located within the temporal lobes.   There is no gray matter heterotopia or cortical dysplasia. The corpus callosum and other midline intracranial structures are unremarkable in appearance.   The visualized paranasal sinuses and mastoid air cells are essentially clear.       1. Unremarkable appearance of the temporal lobes.   2. Stable few nonspecific foci of signal abnormality located within the cerebral hemispheric white matter, likely incidental finding and could reflect gliosis. Other differential consideration is demyelinating lesions.   3. Otherwise, unremarkable MRI brain.   This study was interpreted at [facility].   MACRO: None   Signed by: Franc Hou 4/1/2024 10:23 AM Dictation workstation:   [id number]    MR brain wo IV contrast    Result Date: 3/30/2024  Interpreted By:  Kenyon Arciniega, STUDY: MR BRAIN WO IV CONTRAST; MR ANGIO HEAD WO IV CONTRAST; MR ANGIO NECK WO IV CONTRAST;  3/30/2024 5:07 pm   INDICATION: Signs/Symptoms:stroke rule out, seizure protocol; Signs/Symptoms:stroke rule out.  Stroke protocol.   COMPARISON: 03/29/2024 head CT   ACCESSION NUMBER(S): [id number]; [id number]; [id number]   ORDERING CLINICIAN: BOB FLEMING   TECHNIQUE: Axial T2, FLAIR, DWI, gradient echo T2 and  sagittal and coronal T1 weighted images of brain were acquired.   Time-of-flight MRA of the head  and neck was performed. The images were reviewed as source images and maximum intensity projections. Carotid stenoses were determined using modified NASCET criteria.   FINDINGS: Brain:   CSF Spaces: The ventricles and sulci are normal, unchanged.   Parenchyma: No acute infarct is noted on the diffusion images.   The FLAIR images demonstrate focal white matter  hyperintensities with patchy area of increased intensity in the periatrial region. There is questionable increased signal along the medial aspect of the left temporal lobe and insula bilaterally; this may be related to motion artifact however.   No mass or hemorrhage is noted.   Paranasal Sinuses and Mastoids: Visualized paranasal sinuses and mastoid air cells are unremarkable.   MRA of head:   The images are degraded by motion artifact.   Anterior circulation:  The ophthalmic segments of the internal carotid arteries more so on the right have signal loss most likely from artifact. No other stenoses of the anterior cerebral circulation are noted.   Posterior circulation:    Bilateral intracranial vertebral arteries, vertebrobasilar junction, basilar artery and proximal posterior cerebral arteries demonstrate expected flow signal.   MRA of neck:   The source images are mildly degraded by artifact.   Right carotid vessels:  There is expected flow signal in the visualized portion of the common carotid artery.  There is mild attenuation of flow signal at the carotid bifurcation which may be secondary to flow related artifact. The internal carotid artery in the neck demonstrates expected flow signal.  There is 0% stenosis  .   Left carotid vessels:   There is expected flow signal in the visualized portion of the common carotid artery.  There is mild attenuation of flow signal at the carotid bifurcation which may be secondary to flow related artifact. The internal carotid artery in the neck demonstrates expected flow signal.  There is 0% stenosis  .   Vertebral vessels:   The visualized segments of the cervical vertebral arteries demonstrate expected flow signal.       MRI Brain:   1. The images are degraded by motion artifact, especially the thin section T1 and FLAIR images.   2. No acute infarct, hemorrhage or mass is noted.   3. The white matter has nonspecific FLAIR hyperintensity with questionable hyperintensity  along the medial aspect of the left temporal lobe. Recommend repeat MR with contrast when the patient is better able to tolerate imaging.   MRA:   1. The cerebral MR angiogram is degraded by motion artifact with signal loss in the ophthalmic segments of the internal carotid arteries most likely related to motion. Underlying stenosis in this region cannot be excluded. The remaining intra cerebral circulation is normal.   2. No cervical carotid/vertebral stenosis is noted.   MACRO: None   Signed by: Kenyon Arciniega 3/30/2024 6:09 PM Dictation workstation:   [id number]    CT head wo IV contrast    Result Date: 3/29/2024  Interpreted By:  Zain Stahl, STUDY: CT HEAD WO IV CONTRAST;  3/29/2024 6:44 pm   INDICATION: Signs/Symptoms:ams.   COMPARISON: 11/30/2022   ACCESSION NUMBER(S): [id number]   ORDERING CLINICIAN: KORI DOMNÍGUEZ   TECHNIQUE: Axial noncontrast CT images of the head. Sagittal and coronal reformats were provided. The scan was repeated due to motion artifact.   FINDINGS: BRAIN: No acute intracranial hemorrhage. No mass effect or midline shift. Gray-white matter interfaces are preserved. VENTRICLES and EXTRA-AXIAL SPACES: Normal. EXTRACRANIAL SOFT TISSUES:  Within normal limits. PARANASAL SINUSES/MASTOIDS: The visualized paranasal sinuses and mastoid air cells are aerated. BONES AND ORBITS: No displaced skull fracture. No suspicious osseous lesion.  Orbits are within normal limits. OTHER FINDINGS: None.       1. No acute intracranial hemorrhage or mass effect.   Signed by: Zain Stahl 3/29/2024 7:18 PM Dictation workstation:   [id number]      Assessment/Plan    Seizure-like activity  Working diagnosis thus far of PNES  Anxiety  PTSD  Pt so far has had 2 evaluations (1 ambulatory EEG and 1 EMU monitoring) that has NOT capture episodes and has NOT shown epileptic abnormalities, for episodes that are SIMILAR to what she has had in the past.  Last admit was in end March 2024 with epilepsy  monitoring, and pt was not discharged with sz medication.  Pt has not followed up with epilepsy. She is seeing psychiatry.    At this time, I do not have strong reason to start sz medication. I do not have any acute inpatient testing that I think needs done.    If she is needing dual-purpose antidepressant/seizure medication then this needs to come from her mental health provider--if indicated.    I explored the possibility of possibly treating her HA (? Atypical migraine) with dual-purpose medication (eg topiramate or depakote), but pt denies needing/wanting any more help with that as what she is doing has been helping.    We discussed about her erstwhile diagnosis of PNES. I reiterated and encouraged her to reschedule back with epilepsy and continue followup with her mental health provider    Clinical suspicion for CNS infection is low. I do not think repeat head imaging is needed.    Mother had questions regarding what to do during episodes and I answered them to the best of my ability.    All questions answered. Please reconsult if needed.    Riky Smith MD  8/26/2024  2:29 PM

## 2024-08-26 NOTE — PROGRESS NOTES
"SUBJECTIVE     No seizure since admission  Parents in room    OBJECTIVE:       Physical Exam  /80 (BP Location: Right arm, Patient Position: Lying)   Pulse 73   Temp 36.7 °C (98 °F) (Temporal)   Resp 16   Ht 1.6 m (5' 3\")   Wt 61.2 kg (135 lb)   SpO2 97%   BMI 23.91 kg/m²   Body mass index is 23.91 kg/m².    GEN - NAD  PULM - CTA  CVS - RRR  NEURO - no focal deficits    Scheduled medications  amLODIPine, 5 mg, oral, Daily  enoxaparin, 40 mg, subcutaneous, Daily  escitalopram, 20 mg, oral, Daily  gabapentin, 300 mg, oral, BID  pantoprazole, 40 mg, oral, Daily before breakfast   Or  pantoprazole, 40 mg, intravenous, Daily before breakfast  polyethylene glycol, 17 g, oral, Daily      Continuous medications     PRN medications  PRN medications: acetaminophen, bisacodyl, bisacodyl, guaiFENesin, melatonin, ondansetron    Labs  Results from last 7 days   Lab Units 08/26/24  0633 08/25/24  1209   WBC AUTO x10*3/uL 6.6 4.9   HEMOGLOBIN g/dL 11.8* 12.2   HEMATOCRIT % 35.9* 35.8*   PLATELETS AUTO x10*3/uL 283 292     Results from last 7 days   Lab Units 08/26/24  0540 08/25/24  1209   SODIUM mmol/L 133* 136   POTASSIUM mmol/L 3.6 4.0   CHLORIDE mmol/L 102 101   CO2 mmol/L 22 23   BUN mg/dL 12 16   CREATININE mg/dL 0.61 0.79   CALCIUM mg/dL 8.4* 8.6   PROTEIN TOTAL g/dL 6.7 7.2   BILIRUBIN TOTAL mg/dL 0.8 0.7   ALK PHOS U/L 55 58   ALT U/L 8 9   AST U/L 23 22   GLUCOSE mg/dL 82 96       Microbiology:   No results found for the last 90 days.                   No lab exists for component: \"AGALPCRNB\"   .ID  No results found for: \"URINECULTURE\", \"BLOODCULT\", \"CSFCULTSMEAR\"      ASSESSMENT & PLAN:     1)  Seizure    -  has a history and was on keppra several years ago (stopped due to GI side effects)  - seizure in March admitted to [facility] [place] and had negative MRI and EEG  - these have been called non-epileptogenic and the pt does admit that stress is contributor  - currently on Lexapro and may benefit form either titration or " addition of anti-depressant that has anti-seizure properties but will defer to Neuro        Disposition  Await Neuro recs  Will be able to be DC home when ready

## 2024-08-26 NOTE — CARE PLAN
The patient's goals for the shift include      The clinical goals for the shift include Pt will be hemodynamically stable through out the shiftt      Problem: Fall/Injury  Goal: Not fall by end of shift  Outcome: Progressing  Goal: Be free from injury by end of the shift  Outcome: Progressing  Goal: Verbalize understanding of personal risk factors for fall in the hospital  Outcome: Progressing  Goal: Verbalize understanding of risk factor reduction measures to prevent injury from fall in the home  Outcome: Progressing  Goal: Use assistive devices by end of the shift  Outcome: Progressing  Goal: Pace activities to prevent fatigue by end of the shift  Outcome: Progressing     Problem: Pain  Goal: Takes deep breaths with improved pain control throughout the shift  Outcome: Progressing  Goal: Turns in bed with improved pain control throughout the shift  Outcome: Progressing  Goal: Walks with improved pain control throughout the shift  Outcome: Progressing  Goal: Performs ADL's with improved pain control throughout shift  Outcome: Progressing  Goal: Participates in PT with improved pain control throughout the shift  Outcome: Progressing  Goal: Free from opioid side effects throughout the shift  Outcome: Progressing  Goal: Free from acute confusion related to pain meds throughout the shift  Outcome: Progressing     Problem: Seizure  Goal: I will remain free from seizures  Outcome: Progressing

## 2024-08-26 NOTE — CARE PLAN
The patient's goals for the shift include  Stay free of seizure    The clinical goals for the shift include Pt will be hemodynamically stable through out the shiftt

## 2024-08-26 NOTE — H&P
"[facility] - GENERAL MEDICINE HISTORY AND PHYSICAL    History Obtained From: Patient, boyfriend, family members    History Of Present Illness:  Carie Mckinnon \"Susan" is a 38 y.o. female with PMHx s/f seizures, celiac disease, atopic dermatitis on Rinvoq, PTSD, anxiety, and asthma presenting with seizure. She states she does not have recollections of what happened. Boyfriend states she had two episodes of seizures today, one around 11 am and another around 5:30 pm. He states the first one happened when she was lying on his lap on the bed when she started having migraine auras, almost dream-like cristian-vu moments and became stiffening and had convulsions. He notes it lasts for a few minutes. He called EMS and brought into ED. ED evaluated her without significant findings and discharged her to follow up with a neurology outpatient. She was brought in again due to her second seizure this evening. The second one happened when they were watching TV, she stared off into space and was unresponsive and also notes to be lasting few minutes. She had history of seizures in the past. Used to take keppra few years ago, stopped because it made her sick. No current medications for seizures. No other complaints at this time. Denies f/c, headache, dizziness, chest pain, sob, abd pain, syncope.     ED Course (Summary):   Vitals on presentation: 98.8F, 90 bpm, 16 RR, 122/88, 99% on RA  Labs: CMP, CBC unremarkable  UA negative  Imaging: none  Interventions: none    ED Course (From ED Provider):  Diagnoses as of 08/25/24 2149   Seizure-like activity (Multi)     Relevant Results  Results for orders placed or performed during the hospital encounter of 08/25/24 (from the past 24 hour(s))   CBC and Auto Differential   Result Value Ref Range    WBC 4.9 4.4 - 11.3 x10*3/uL    nRBC 0.0 0.0 - 0.0 /100 WBCs    RBC 3.69 (L) 4.00 - 5.20 x10*6/uL    Hemoglobin 12.2 12.0 - 16.0 g/dL    Hematocrit 35.8 (L) 36.0 - 46.0 %    MCV 97 80 - " 100 fL    MCH 33.1 26.0 - 34.0 pg    MCHC 34.1 32.0 - 36.0 g/dL    RDW 14.5 11.5 - 14.5 %    Platelets 292 150 - 450 x10*3/uL    Neutrophils % 60.3 40.0 - 80.0 %    Immature Granulocytes %, Automated 0.4 0.0 - 0.9 %    Lymphocytes % 28.4 13.0 - 44.0 %    Monocytes % 8.3 2.0 - 10.0 %    Eosinophils % 2.2 0.0 - 6.0 %    Basophils % 0.4 0.0 - 2.0 %    Neutrophils Absolute 2.97 1.20 - 7.70 x10*3/uL    Immature Granulocytes Absolute, Automated 0.02 0.00 - 0.70 x10*3/uL    Lymphocytes Absolute 1.40 1.20 - 4.80 x10*3/uL    Monocytes Absolute 0.41 0.10 - 1.00 x10*3/uL    Eosinophils Absolute 0.11 0.00 - 0.70 x10*3/uL    Basophils Absolute 0.02 0.00 - 0.10 x10*3/uL   Phosphorus   Result Value Ref Range    Phosphorus 2.1 (L) 2.5 - 4.9 mg/dL   Magnesium   Result Value Ref Range    Magnesium 1.65 1.60 - 2.40 mg/dL   Comprehensive metabolic panel   Result Value Ref Range    Glucose 96 74 - 99 mg/dL    Sodium 136 136 - 145 mmol/L    Potassium 4.0 3.5 - 5.3 mmol/L    Chloride 101 98 - 107 mmol/L    Bicarbonate 23 21 - 32 mmol/L    Anion Gap 16 10 - 20 mmol/L    Urea Nitrogen 16 6 - 23 mg/dL    Creatinine 0.79 0.50 - 1.05 mg/dL    eGFR >90 >60 mL/min/1.73m*2    Calcium 8.6 8.6 - 10.3 mg/dL    Albumin 4.6 3.4 - 5.0 g/dL    Alkaline Phosphatase 58 33 - 110 U/L    Total Protein 7.2 6.4 - 8.2 g/dL    AST 22 9 - 39 U/L    Bilirubin, Total 0.7 0.0 - 1.2 mg/dL    ALT 9 7 - 45 U/L   Urinalysis with Reflex Microscopic   Result Value Ref Range    Color, Urine Light-Yellow Light-Yellow, Yellow, Dark-Yellow    Appearance, Urine Clear Clear    Specific Gravity, Urine 1.014 1.005 - 1.035    pH, Urine 6.5 5.0, 5.5, 6.0, 6.5, 7.0, 7.5, 8.0    Protein, Urine 20 (TRACE) NEGATIVE, 10 (TRACE), 20 (TRACE) mg/dL    Glucose, Urine Normal Normal mg/dL    Blood, Urine NEGATIVE NEGATIVE    Ketones, Urine 10 (1+) (A) NEGATIVE mg/dL    Bilirubin, Urine NEGATIVE NEGATIVE    Urobilinogen, Urine Normal Normal mg/dL    Nitrite, Urine NEGATIVE NEGATIVE     Leukocyte Esterase, Urine NEGATIVE NEGATIVE   hCG, Urine, Qualitative   Result Value Ref Range    HCG, Urine NEGATIVE NEGATIVE   Microscopic Only, Urine   Result Value Ref Range    WBC, Urine 1-5 1-5, NONE /HPF    RBC, Urine 3-5 NONE, 1-2, 3-5 /HPF    Squamous Epithelial Cells, Urine 1-9 (SPARSE) Reference range not established. /HPF    Mucus, Urine FEW Reference range not established. /LPF    Hyaline Casts, Urine OCCASIONAL (A) NONE /LPF      No results found.   Scheduled medications:  [START ON 8/26/2024] amLODIPine, 5 mg, oral, Daily  [START ON 8/26/2024] escitalopram, 20 mg, oral, Daily  gabapentin, 300 mg, oral, BID  [START ON 8/26/2024] pantoprazole, 40 mg, oral, Daily before breakfast   Or  [START ON 8/26/2024] pantoprazole, 40 mg, intravenous, Daily before breakfast  [START ON 8/26/2024] polyethylene glycol, 17 g, oral, Daily      Continuous medications:     PRN medications:  PRN medications: acetaminophen, bisacodyl, bisacodyl, guaiFENesin, melatonin, ondansetron     Past Medical History  She has a past medical history of Personal history of other diseases of the circulatory system and Unspecified cataract.    Surgical History  She has a past surgical history that includes Other surgical history (07/21/2022).     Social History  She reports that she has never smoked. She has never been exposed to tobacco smoke. She has never used smokeless tobacco. She reports current alcohol use of about 4.0 standard drinks of alcohol per week. She reports that she does not use drugs.    Family History  Family History   Problem Relation Name Age of Onset    Breast cancer Mother      Colon cancer Father      Allergies Other         Allergies  Other, Peanut, Fish containing products, Gluten protein, Penicillins, Soy, Tree nut, Corticosteroids (glucocorticoids), and Prednisone    Code Status  Full Code     Review of Systems   Constitutional:  Negative for chills, fatigue and fever.   Respiratory:  Negative for cough, chest  tightness, shortness of breath and wheezing.    Cardiovascular:  Negative for chest pain, palpitations and leg swelling.   Gastrointestinal:  Negative for abdominal pain, constipation, diarrhea, nausea and vomiting.   Genitourinary:  Negative for flank pain and hematuria.   Musculoskeletal:  Negative for back pain and joint swelling.   Skin:  Negative for rash and wound.   Neurological:  Positive for seizures. Negative for tremors, syncope, weakness and headaches.       Last Recorded Vitals  /88 (BP Location: Right arm, Patient Position: Lying)   Pulse 91   Temp 37.4 °C (99.3 °F) (Temporal)   Resp 20   Wt 61.2 kg (135 lb)   SpO2 96%      Physical Exam:  Vital signs and nursing notes reviewed.   Constitutional: Pleasant and cooperative. Laying in bed in no acute distress. Conversant.   Skin: Warm and dry; no obvious lesions, rashes, pallor, or jaundice. Good turgor.   Eyes: EOMI. Anicteric sclera.   ENT: Mucous membranes moist; no obvious injury or deformity appreciated.   Head and Neck: Normocephalic, atraumatic. ROM preserved. Trachea midline. No appreciable JVD.   Respiratory: Nonlabored on RA. Lungs clear to auscultation bilaterally without obvious adventitious sounds. Chest rise is equal.  Cardiovascular: RRR. No gross murmur, gallop, or rub. Extremities are warm and well-perfused with good capillary refill (< 3 seconds). No chest wall tenderness.   GI: Abdomen soft, nontender, nondistended. No obvious organomegaly appreciated. Bowel sounds are present and normoactive.  : No CVA tenderness.   MSK: No gross abnormalities appreciated. No limitations to AROM/PROM appreciated.   Extremities: No cyanosis, edema, or clubbing evident. Neurovascularly intact.   Neuro: A&Ox3. CN 2-12 grossly intact. Able to respond to questions appropriately and clearly. No acute focal neurologic deficits appreciated.  Psych: Appropriate mood and behavior.    Assessment/Plan     38 y.o. female with PMHx s/f  seizures, celiac  disease, atopic dermatitis on Rinvoq, PTSD, anxiety, and asthma presenting with seizure.     Seizure  -pt had a hospitalization on 3/30/24-04/02/24 at [facility] and had a workup of inpatient continuous video EEG with no epileptiform discharges, nonspecific white lesion MRI changes  -note reads that the patient likely suffers from psychogenic nonepileptic seizures  -neurology consult    Nerve pain  -continue gabapentin    Asthma  -continue home inhaler    Atopic dermatitis  -Rinvoq    PTSD/anxiety  -lexapro    Diet: regular  DVT Prophylaxis: none  Code Status: full code     Dusty Mitchell PA-C    Dragon dictation software was used to dictate this note and thus there may be minor errors in translation/transcription including garbled speech or misspellings. Please contact for clarification if needed.

## 2024-08-28 ENCOUNTER — PATIENT OUTREACH (OUTPATIENT)
Dept: PRIMARY CARE | Facility: CLINIC | Age: 39
End: 2024-08-28
Payer: COMMERCIAL

## 2024-08-28 NOTE — PROGRESS NOTES
Discharge Facility: [facility]   Discharge Diagnosis: Seizure-like activity; Seizures   Admission Date: 8/25/2024  Discharge Date: 8/26/2024    PCP Appointment Date: TBD-office tasked to arrange fup with PCP as needed  Specialist Appointment Date: TBD  Hospital Encounter and Summary Linked: Yes    Two attempts were made to reach patient within two business days after discharge. Voicemail left with contact information for patient to call back with any non-emergent questions or concerns.

## 2024-08-29 LAB
ATRIAL RATE: 93 BPM
P AXIS: 47 DEGREES
PR INTERVAL: 175 MS
Q ONSET: 254 MS
QRS COUNT: 15 BEATS
QRS DURATION: 87 MS
QT INTERVAL: 409 MS
QTC CALCULATION(BAZETT): 512 MS
QTC FREDERICIA: 475 MS
R AXIS: 25 DEGREES
T AXIS: 108 DEGREES
T OFFSET: 459 MS
VENTRICULAR RATE: 94 BPM

## 2024-09-03 DIAGNOSIS — G45.9 TIA (TRANSIENT ISCHEMIC ATTACK): ICD-10-CM

## 2024-09-03 DIAGNOSIS — J45.40 MODERATE PERSISTENT ASTHMA WITHOUT COMPLICATION (HHS-HCC): Primary | ICD-10-CM

## 2024-09-11 ENCOUNTER — PATIENT OUTREACH (OUTPATIENT)
Dept: PRIMARY CARE | Facility: CLINIC | Age: 39
End: 2024-09-11
Payer: COMMERCIAL

## 2024-09-11 NOTE — PROGRESS NOTES
Unable to reach patient for call back after patient's follow up appointment with PCP.   RAVINDERM with call back number for patient to call if needed   If no voicemail available call attempts x 2 were made to contact the patient to assist with any questions or concerns patient may have.

## 2024-10-09 ENCOUNTER — OFFICE VISIT (OUTPATIENT)
Dept: NEUROLOGY | Facility: CLINIC | Age: 39
End: 2024-10-09
Payer: COMMERCIAL

## 2024-10-09 VITALS
HEART RATE: 77 BPM | SYSTOLIC BLOOD PRESSURE: 112 MMHG | DIASTOLIC BLOOD PRESSURE: 79 MMHG | RESPIRATION RATE: 18 BRPM | BODY MASS INDEX: 26.57 KG/M2 | WEIGHT: 150 LBS

## 2024-10-09 DIAGNOSIS — R56.9 SEIZURES (MULTI): ICD-10-CM

## 2024-10-09 DIAGNOSIS — R56.9 SEIZURE-LIKE ACTIVITY (MULTI): Primary | Chronic | ICD-10-CM

## 2024-10-09 PROCEDURE — 99214 OFFICE O/P EST MOD 30 MIN: CPT | Performed by: PSYCHIATRY & NEUROLOGY

## 2024-10-09 ASSESSMENT — PAIN SCALES - GENERAL: PAINLEVEL: 0-NO PAIN

## 2024-10-09 NOTE — PATIENT INSTRUCTIONS
"Roger Mckinnon \"Sherin\",    It was a privilege caring for you at [facility]. As we discussed:      -You have been diagnosed with nonepileptic episodes. These are also called psychogenic nonepileptic events. This is a bodily response to an known or unknown stressor to your body that is not being driven by electrical abnormalities.  Medications used for epilepsy do not work for psychogenic nonepileptic seizures (PNES). For PNES, the main areas of treatment includes psychotherapy, managing stress better by using regular exercise and relaxation techniques, treating underlying psychiatric disorders as well as physical conditions, including pain, and maintaining healthy lifestyle. You can find more information about psychogenic nonepileptic episodes at   www.nonepilepticattacks.info.   [email or web address]  [email or web address]   - Because these episodes interfere with your level of consciousness, do not drive. Do not to use power tools, operate heavy machinery, or climb on ladders.  You may continue to use the shower, but not the bath. Refrain from any activity which could result in injury to yourself or others if you had a seizure or lost consciousness. These restrictions should continue until clearance from a psychiatrist.     Kindly,     Neurology    "

## 2024-10-09 NOTE — PROGRESS NOTES
"[facility]   Epilepsy    Patient ID: Carie Mckinnon \"Sherin\" 38 y.o.female presenting for evaluation of paroxysmal events.   Patient of: Dr. Emiliano Gale    HPI  Per patient, starting in 2020, events start with 'dream like state' then she becomes unaware, goldman she wakes up she screams/startles. Will have some retrograde memory afterwards and sometime anterograde for days afterwards. Does have tongue bites on both side and at tip at times. No BBI.     Dad witnessed an event 2 weeks ago, she was in bed, heard her shaking and looked and she was moving - low amplitude BUE/BLE movements with hands clenching/clawed. Was not quite confused after the event. Lasted under 3 minutes. Had another event a while later. Mom saw other semiology in 3/2024, she was looking around, no response to voice (she ddnt try touch per se), makes sound of romeo at the end of about 1-2 min. She couldn't answer questions appropriately afterwards for a short time. First event was 2016, there were a few until 2020 - then nothing until 3/2024. Endorses stress near onset (derm related in 2016).     Per chart:  Inpatient OSH 8/26/24 - Boyfriend was with her, when she was noted to have seizure-like activity.  She was watching TV, had like a dreamlike/déjà vu episode.  She was then noted to have arm stiffening on right(?) side, began convulsing, and then left arm stiffening and began convulsing.  She was having some foaming in the mouth, turned blue, and she describes she was not aware.  She was evaluated at [facility] ED for this, and was discharged home.  Later in the afternoon, the same thing happened, and she was brought back to [facility] ED again for reevaluation and was subsequently admitted.  Patient did not have head imaging done.  Basic labs done were unremarkable.  Urinalysis did not show any signs of UTI.  Neurology was consulted was overall working dx of PNES with plan for fuv outpatient.     EMU admission 4/2024: Admitted for " "evaluation of event when she had called boyfriend and was talking like she was talking to someone at work, and not speaking to him. When asked about this episode she did not remember the conversation. 3/21/23 her parents went over to her place. She had left her car outside which was unusual. She was not answering the phone. Parents found her in the bed. When asked if she was okay she said she was throwing up. On 3/22/23, parents noticed that she was looking at them funny (staring at them) and not acting like herself. She was not talking, and she had to be carried out of the bed to the ambulance.  \"During inpatient continuous video EEG no epileptiform discharges, MRI brain showed nonspecific white matter lesion otherwise unremarkable. \"    [facility] neurology virtual visit 8/2020:        Prior to 2020, when she was in [place], she saw a neurologist for similar seizure-like activity. Ambulatory EEG monitoring was inconclusive.  She did not have any episodes during the monitoring, and her EEG did not show any abnormalities.  In 2020, she had a telehealth visit with her [facility] neurologist (see above).      Sz Risk Factors:  No Fhx, no head trauma, no infx or surgeries.     RESULTS:  MRI: MR brain w and wo IV contrast    Result Date: 4/1/2024  Interpreted By:  Franc Hou, STUDY: MR BRAIN W AND WO IV CONTRAST; ;  4/1/2024 1:48 am   INDICATION: Signs/Symptoms:Encephalopathy, L temporal FLAIR hyperintensity, possible seizures.   COMPARISON: MRI brain from 03/30/2024.   ACCESSION NUMBER(S): [id number]   ORDERING CLINICIAN: MARCELINA TORRES   TECHNIQUE: MRI of the brain was performed with the acquisition of axial diffusion-weighted, axial FLAIR, axial T1, axial T2 gradient echo, axial T2 fat saturated, sagittal T1 MP-RAGE with multiplanar reformats, axial T1 fat saturated postcontrast sequence, and volumetric axial T1 weighted postcontrast sequence with multiplanar reformats.   Contrast: 13 mL of Dotarem was injected " intravenously.   FINDINGS: There is no acute intracranial hemorrhage or infarct. There is no abnormal extra-axial fluid collection or mass effect. The ventricles, sulci, and basilar cisterns are normal in size, shape, and configuration for patient's age. There are few scattered foci of T2 hyperintensity within the cerebral hemispheric white matter including within the left periatrial white matter which are nonspecific.   There is no signal abnormality located within the temporal lobes.   There is no gray matter heterotopia or cortical dysplasia. The corpus callosum and other midline intracranial structures are unremarkable in appearance.   The visualized paranasal sinuses and mastoid air cells are essentially clear.       1. Unremarkable appearance of the temporal lobes.   2. Stable few nonspecific foci of signal abnormality located within the cerebral hemispheric white matter, likely incidental finding and could reflect gliosis. Other differential consideration is demyelinating lesions.   3. Otherwise, unremarkable MRI brain.   This study was interpreted at [facility].   MACRO: None   Signed by: Franc Hou 4/1/2024 10:23 AM Dictation workstation:   [id number]    MR brain wo IV contrast    Result Date: 3/30/2024  Interpreted By:  Kenyon Arciniega, STUDY: MR BRAIN WO IV CONTRAST; MR ANGIO HEAD WO IV CONTRAST; MR ANGIO NECK WO IV CONTRAST;  3/30/2024 5:07 pm   INDICATION: Signs/Symptoms:stroke rule out, seizure protocol; Signs/Symptoms:stroke rule out.  Stroke protocol.   COMPARISON: 03/29/2024 head CT   ACCESSION NUMBER(S): [id number]; [id number]; [id number]   ORDERING CLINICIAN: BOB FLEMING   TECHNIQUE: Axial T2, FLAIR, DWI, gradient echo T2 and  sagittal and coronal T1 weighted images of brain were acquired.   Time-of-flight MRA of the head  and neck was performed. The images were reviewed as source images and maximum intensity projections. Carotid stenoses were determined  using modified NASCET criteria.   FINDINGS: Brain:   CSF Spaces: The ventricles and sulci are normal, unchanged.   Parenchyma: No acute infarct is noted on the diffusion images.   The FLAIR images demonstrate focal white matter hyperintensities with patchy area of increased intensity in the periatrial region. There is questionable increased signal along the medial aspect of the left temporal lobe and insula bilaterally; this may be related to motion artifact however.   No mass or hemorrhage is noted.   Paranasal Sinuses and Mastoids: Visualized paranasal sinuses and mastoid air cells are unremarkable.   MRA of head:   The images are degraded by motion artifact.   Anterior circulation:  The ophthalmic segments of the internal carotid arteries more so on the right have signal loss most likely from artifact. No other stenoses of the anterior cerebral circulation are noted.   Posterior circulation:    Bilateral intracranial vertebral arteries, vertebrobasilar junction, basilar artery and proximal posterior cerebral arteries demonstrate expected flow signal.   MRA of neck:   The source images are mildly degraded by artifact.   Right carotid vessels:  There is expected flow signal in the visualized portion of the common carotid artery.  There is mild attenuation of flow signal at the carotid bifurcation which may be secondary to flow related artifact. The internal carotid artery in the neck demonstrates expected flow signal.  There is 0% stenosis  .   Left carotid vessels:   There is expected flow signal in the visualized portion of the common carotid artery.  There is mild attenuation of flow signal at the carotid bifurcation which may be secondary to flow related artifact. The internal carotid artery in the neck demonstrates expected flow signal.  There is 0% stenosis  .   Vertebral vessels:   The visualized segments of the cervical vertebral arteries demonstrate expected flow signal.       MRI Brain:   1. The images  "are degraded by motion artifact, especially the thin section T1 and FLAIR images.   2. No acute infarct, hemorrhage or mass is noted.   3. The white matter has nonspecific FLAIR hyperintensity with questionable hyperintensity along the medial aspect of the left temporal lobe. Recommend repeat MR with contrast when the patient is better able to tolerate imaging.   MRA:   1. The cerebral MR angiogram is degraded by motion artifact with signal loss in the ophthalmic segments of the internal carotid arteries most likely related to motion. Underlying stenosis in this region cannot be excluded. The remaining intra cerebral circulation is normal.   2. No cervical carotid/vertebral stenosis is noted.   MACRO: None   Signed by: Kenyon Arciniega 3/30/2024 6:09 PM Dictation workstation:   [id number]      EEG:  EEG    Result Date: 4/2/2024  IMPRESSION This EEG is indicative of a mild diffuse encephalopathy. No epileptiform discharges or lateralizing signs are seen. A full report will be scanned into the patient's chart at a later time. This report has been interpreted and electronically signed by   - aEEG 2/21/18 (MARCOS): Twenty-three-hour ambulatory EEG monitoring is normal. No epileptiform abnormalities or electrographic seizures were seen. There were 12 push button events recorded, but no event diary was available at the time of EEG interpretation. No EEG change was seen in association with these events, suggesting they are nonepileptic in nature. Patient stated later that she pushed button for small twitches of extremities/myoclonic-type jerks.   - EEG 1/30/18 ([facility]): Normal  - EEG 3/2018 ([facility] in [place], [place]) reported as \"normal\"     LABS:   Lab Results   Component Value Date     (L) 08/26/2024    BUN 12 08/26/2024    CREATININE 0.61 08/26/2024      Vitals:  There were no vitals filed for this visit.    PHYSICAL EXAM:  Gen: NAD  CV: WWP  Pulm: No incr WOB    Neuro:  AxOx3, FC  PERRL, EOMI, No FD   5/5 " strength throughout  Reflexes 2+ BUE and 1+ BLE  SILT    ASSESSMENT & PLAN:   38 y.o. female presenting for evaluation of paroxymal events. Given the semiology, lack of EEG & imaging findings, temporal relationship to stressors and resolution for extended periods of time with subsequent exacerbation in setting of increased stress, the events are overall inconsistent with epilepsy, rather, are likely to be PNES.     Semiology 1: Psychic Aura Event -> Dialepsis Event -> Generalized Motor Event  Onset: 2016   Frequency: 2x per month 2594-4177, then at least 3 in 2024  Last seizure: 8/26/24  Etiology: Unknown  Comorbidities: PTSD, Anxiety    #PNES  -Can follow up with psychiatry/behavioral health  -Counseled to not drive. Do not to use power tools, operate heavy machinery, or climb on ladders. Refrain from any activity which could result in injury to yourself or others if you had a seizure or lost consciousness. These restrictions should continue until clearance from a psychiatrist.     Anshu Triana  PGY-5, Epilepsy Fellow

## 2024-10-11 PROBLEM — R87.810 CERVICAL HIGH RISK HPV (HUMAN PAPILLOMAVIRUS) TEST POSITIVE: Status: ACTIVE | Noted: 2024-10-11

## 2024-10-11 NOTE — PROGRESS NOTES
"Carie Mckinnon \"Sherin\" is a 38 y.o. female who is here for a  routine exam. PCP = Evelyne Millard MD  Family Hx: mother had breast cancer , negative for BRCA gene   Paternal  grandfather and his sister with H/o  colon cancer.   She has mirena IUD in situ, inserted in 2023, has stopped periods  APE Concerns: none    Other Concerns: none  Preventative:  [facility]   Seat Belt Use: always    GynHx:  Periods are  none , lasting 0 days.  Dysmenorrhea: none.   Cyclic symptoms include none.    Social History     Substance and Sexual Activity   Sexual Activity Yes    Partners: Male    Birth control/protection: I.U.D.     Current contraception: IUD  STIs: none  Last PAP: 2023, negative PAP with Positive HR HPV      SoHx:  Substance:   Tobacco Use: Low Risk  (10/14/2024)    Patient History     Smoking Tobacco Use: Never     Smokeless Tobacco Use: Never     Passive Exposure: Never      Social History     Substance and Sexual Activity   Drug Use Never      Social History     Substance and Sexual Activity   Alcohol Use Yes    Alcohol/week: 4.0 standard drinks of alcohol    Types: 4 Standard drinks or equivalent per week    Comment: 2-4 of wine 0nce or twice a week       Past Medical History:   Diagnosis Date    Personal history of other diseases of the circulatory system     History of SBE (subacute bacterial endocarditis)    Unspecified cataract     Cataracts, both eyes      Past Surgical History:   Procedure Laterality Date    ANKLE SURGERY Bilateral     OTHER SURGICAL HISTORY  07/21/2022    No history of surgery      Objective   /80   Ht 1.6 m (5' 3\")   Wt 67.1 kg (148 lb)   LMP  (LMP Unknown) Comment: Mirena  BMI 26.22 kg/m²      General:   Alert and oriented, in no acute distress   Neck: Supple. No visible thyromegaly.    Breast/Axilla: Normal to palpation bilaterally without masses, skin changes, or nipple discharge.    Abdomen: Soft, non-tender, without masses " or organomegaly   Vulva: Normal architecture without erythema, masses, or lesions.    Vagina: Normal mucosa without lesions, masses, or atrophy. No abnormal vaginal discharge.    Cervix: Normal without masses, lesions, or signs of cervicitis. IUD string visible   Uterus: Normal mobile, non-enlarged uterus    Adnexa: Normal without masses or lesions   Pelvic Floor No POP noted. No high tone pelvic floor    Psych Normal affect. Normal mood.      Problem List Items Addressed This Visit       Contraceptive, surveillance, intrauterine device    Cervical high risk HPV (human papillomavirus) test positive - Primary    At high risk for breast cancer    Encounter for gynecological examination with abnormal finding    High risk for colon cancer     Other Visit Diagnoses       Screening mammogram for breast cancer        Relevant Orders    BI mammo bilateral screening tomosynthesis    Family history of breast cancer        Relevant Orders    BI mammo bilateral screening tomosynthesis    Family history of colon cancer        Relevant Orders    Colonoscopy Screening; High Risk Patient; family history of colon cancer    Screening for colon cancer        Relevant Orders    Colonoscopy Screening; High Risk Patient; family history of colon cancer    Screening for cervical cancer        Relevant Orders    THINPREP PAP    Screening for human papillomavirus (HPV)        Relevant Orders    THINPREP PAP    Encounter for well woman exam with routine gynecological exam        Relevant Orders    THINPREP PAP           Assessment/Plan    Thank you for coming to your annual exam. Your findings during the exam were normal. Specific topics addressed during this exam included: healthy lifestyle, well woman screening guidelines,  Healthy diet with high fiber, Weight bearing exercise 3 to 5 times a week, Multivitamins and calcium     Actions performed during this visit include:  - Clinical breast exam  - Clinical pelvic exam  - PAP and HPV  ordered  - Mammogram ordered.  - Screening colonoscopy referral done  Orders Placed This Encounter   Procedures    BI mammo bilateral screening tomosynthesis     Standing Status:   Future     Standing Expiration Date:   12/14/2025     Order Specific Question:   Is the patient pregnant?     Answer:   No     Order Specific Question:   Reason for exam:     Answer:   family history of breast cancer, needs screening mammogram     Order Specific Question:   Radiologist to Determine Optimal Study     Answer:   Yes     Order Specific Question:   Release result to [employer]     Answer:   Immediate [1]     Order Specific Question:   Is this exam part of a Research Study? If Yes, link this order to the research study     Answer:   No        Please return for your next visit in 1 year.

## 2024-10-14 ENCOUNTER — APPOINTMENT (OUTPATIENT)
Dept: OBSTETRICS AND GYNECOLOGY | Facility: CLINIC | Age: 39
End: 2024-10-14
Payer: COMMERCIAL

## 2024-10-14 VITALS
DIASTOLIC BLOOD PRESSURE: 80 MMHG | BODY MASS INDEX: 26.22 KG/M2 | WEIGHT: 148 LBS | HEIGHT: 63 IN | SYSTOLIC BLOOD PRESSURE: 110 MMHG

## 2024-10-14 DIAGNOSIS — Z80.0 FAMILY HISTORY OF COLON CANCER: ICD-10-CM

## 2024-10-14 DIAGNOSIS — Z91.89 HIGH RISK FOR COLON CANCER: ICD-10-CM

## 2024-10-14 DIAGNOSIS — Z80.3 FAMILY HISTORY OF BREAST CANCER: ICD-10-CM

## 2024-10-14 DIAGNOSIS — Z01.411 ENCOUNTER FOR GYNECOLOGICAL EXAMINATION WITH ABNORMAL FINDING: ICD-10-CM

## 2024-10-14 DIAGNOSIS — Z91.89 AT HIGH RISK FOR BREAST CANCER: ICD-10-CM

## 2024-10-14 DIAGNOSIS — Z12.4 SCREENING FOR CERVICAL CANCER: ICD-10-CM

## 2024-10-14 DIAGNOSIS — Z12.31 SCREENING MAMMOGRAM FOR BREAST CANCER: ICD-10-CM

## 2024-10-14 DIAGNOSIS — Z11.51 SCREENING FOR HUMAN PAPILLOMAVIRUS (HPV): ICD-10-CM

## 2024-10-14 DIAGNOSIS — Z12.11 SCREENING FOR COLON CANCER: ICD-10-CM

## 2024-10-14 DIAGNOSIS — Z01.419 ENCOUNTER FOR WELL WOMAN EXAM WITH ROUTINE GYNECOLOGICAL EXAM: ICD-10-CM

## 2024-10-14 DIAGNOSIS — R87.810 CERVICAL HIGH RISK HPV (HUMAN PAPILLOMAVIRUS) TEST POSITIVE: Primary | ICD-10-CM

## 2024-10-14 DIAGNOSIS — Z30.431 CONTRACEPTIVE, SURVEILLANCE, INTRAUTERINE DEVICE: ICD-10-CM

## 2024-10-14 PROCEDURE — 87624 HPV HI-RISK TYP POOLED RSLT: CPT

## 2024-10-14 PROCEDURE — 3074F SYST BP LT 130 MM HG: CPT | Performed by: OBSTETRICS & GYNECOLOGY

## 2024-10-14 PROCEDURE — 3008F BODY MASS INDEX DOCD: CPT | Performed by: OBSTETRICS & GYNECOLOGY

## 2024-10-14 PROCEDURE — 1036F TOBACCO NON-USER: CPT | Performed by: OBSTETRICS & GYNECOLOGY

## 2024-10-14 PROCEDURE — 3079F DIAST BP 80-89 MM HG: CPT | Performed by: OBSTETRICS & GYNECOLOGY

## 2024-10-14 PROCEDURE — 99395 PREV VISIT EST AGE 18-39: CPT | Performed by: OBSTETRICS & GYNECOLOGY

## 2024-10-22 DIAGNOSIS — J45.40 MODERATE PERSISTENT ASTHMA WITHOUT COMPLICATION (HHS-HCC): ICD-10-CM

## 2024-10-23 ENCOUNTER — APPOINTMENT (OUTPATIENT)
Dept: PSYCHOLOGY | Facility: CLINIC | Age: 39
End: 2024-10-23
Payer: COMMERCIAL

## 2024-10-23 ENCOUNTER — HOSPITAL ENCOUNTER (OUTPATIENT)
Dept: RADIOLOGY | Facility: HOSPITAL | Age: 39
Discharge: HOME | End: 2024-10-23
Payer: COMMERCIAL

## 2024-10-23 VITALS — HEIGHT: 63 IN | WEIGHT: 150 LBS | BODY MASS INDEX: 26.58 KG/M2

## 2024-10-23 DIAGNOSIS — Z80.3 FAMILY HISTORY OF BREAST CANCER: ICD-10-CM

## 2024-10-23 DIAGNOSIS — Z12.31 SCREENING MAMMOGRAM FOR BREAST CANCER: ICD-10-CM

## 2024-10-23 PROCEDURE — 77063 BREAST TOMOSYNTHESIS BI: CPT | Performed by: RADIOLOGY

## 2024-10-23 PROCEDURE — 77067 SCR MAMMO BI INCL CAD: CPT

## 2024-10-23 PROCEDURE — 77067 SCR MAMMO BI INCL CAD: CPT | Performed by: RADIOLOGY

## 2024-10-28 LAB

## 2024-10-29 ENCOUNTER — APPOINTMENT (OUTPATIENT)
Dept: BEHAVIORAL HEALTH | Facility: CLINIC | Age: 39
End: 2024-10-29
Payer: COMMERCIAL

## 2024-10-29 DIAGNOSIS — F41.1 GAD (GENERALIZED ANXIETY DISORDER): ICD-10-CM

## 2024-10-29 PROCEDURE — 1036F TOBACCO NON-USER: CPT

## 2024-10-29 PROCEDURE — 99214 OFFICE O/P EST MOD 30 MIN: CPT

## 2024-10-29 RX ORDER — ESCITALOPRAM OXALATE 20 MG/1
20 TABLET ORAL DAILY
Qty: 90 TABLET | Refills: 1 | Status: SHIPPED | OUTPATIENT
Start: 2024-10-29 | End: 2025-04-27

## 2024-10-29 ASSESSMENT — ENCOUNTER SYMPTOMS
CONSTITUTIONAL NEGATIVE: 1
PSYCHIATRIC NEGATIVE: 1

## 2024-11-14 ENCOUNTER — TELEPHONE (OUTPATIENT)
Dept: PRIMARY CARE | Facility: CLINIC | Age: 39
End: 2024-11-14
Payer: COMMERCIAL

## 2024-11-14 NOTE — TELEPHONE ENCOUNTER
Patient is asking for a return to work note.   She left work early on Tuesday 11/12 and then was out 11/13 & 11/14 due to a migraine w/ vomiting.  She would like note to return to work on Friday 11/15/24 and notified of response.    (A letter has already been composed for review / signature and placed in bin for if you ok this message)    Patient can be reached at [phone] with questions.

## 2024-11-24 DIAGNOSIS — I10 PRIMARY HYPERTENSION: ICD-10-CM

## 2024-11-25 RX ORDER — AMLODIPINE BESYLATE 5 MG/1
5 TABLET ORAL DAILY
Qty: 30 TABLET | Refills: 5 | Status: SHIPPED | OUTPATIENT
Start: 2024-11-25 | End: 2025-05-24

## 2024-12-10 RX ORDER — FLUTICASONE PROPIONATE AND SALMETEROL 100; 50 UG/1; UG/1
POWDER RESPIRATORY (INHALATION)
Refills: 1 | OUTPATIENT
Start: 2024-12-10

## 2024-12-13 DIAGNOSIS — J45.40 MODERATE PERSISTENT ASTHMA WITHOUT COMPLICATION (HHS-HCC): ICD-10-CM

## 2024-12-13 RX ORDER — FLUTICASONE PROPIONATE AND SALMETEROL 100; 50 UG/1; UG/1
1 POWDER RESPIRATORY (INHALATION)
Qty: 60 EACH | Refills: 3 | Status: SHIPPED | OUTPATIENT
Start: 2024-12-13

## 2024-12-27 DIAGNOSIS — M21.172 VARUS DEFORMITY, NOT ELSEWHERE CLASSIFIED, LEFT ANKLE: ICD-10-CM

## 2024-12-28 ENCOUNTER — TELEPHONE (OUTPATIENT)
Dept: PRIMARY CARE | Facility: CLINIC | Age: 39
End: 2024-12-28
Payer: COMMERCIAL

## 2024-12-28 DIAGNOSIS — M21.172 VARUS DEFORMITY, NOT ELSEWHERE CLASSIFIED, LEFT ANKLE: Primary | ICD-10-CM

## 2024-12-28 RX ORDER — GABAPENTIN 300 MG/1
300 CAPSULE ORAL 2 TIMES DAILY
Qty: 20 CAPSULE | Refills: 0 | Status: SHIPPED | OUTPATIENT
Start: 2024-12-28

## 2024-12-28 RX ORDER — GABAPENTIN 300 MG/1
300 CAPSULE ORAL 2 TIMES DAILY
Qty: 60 CAPSULE | Refills: 6 | Status: SHIPPED | OUTPATIENT
Start: 2024-12-28 | End: 2024-12-28 | Stop reason: SDUPTHER

## 2025-01-06 DIAGNOSIS — J45.40 MODERATE PERSISTENT ASTHMA WITHOUT COMPLICATION (HHS-HCC): Primary | ICD-10-CM

## 2025-01-06 RX ORDER — FLUTICASONE PROPIONATE AND SALMETEROL 250; 50 UG/1; UG/1
1 POWDER RESPIRATORY (INHALATION)
Qty: 60 EACH | Refills: 11 | Status: SHIPPED | OUTPATIENT
Start: 2025-01-06 | End: 2026-01-06

## 2025-01-29 ENCOUNTER — APPOINTMENT (OUTPATIENT)
Dept: BEHAVIORAL HEALTH | Facility: CLINIC | Age: 40
End: 2025-01-29
Payer: COMMERCIAL

## 2025-02-07 ENCOUNTER — HOSPITAL ENCOUNTER (OUTPATIENT)
Dept: RADIOLOGY | Facility: CLINIC | Age: 40
Discharge: HOME | End: 2025-02-07
Payer: COMMERCIAL

## 2025-02-07 DIAGNOSIS — S33.5XXA LUMBAR SPRAIN, INITIAL ENCOUNTER: ICD-10-CM

## 2025-02-07 PROCEDURE — 72100 X-RAY EXAM L-S SPINE 2/3 VWS: CPT

## 2025-02-07 PROCEDURE — 72170 X-RAY EXAM OF PELVIS: CPT

## 2025-02-14 DIAGNOSIS — L20.89 OTHER ATOPIC DERMATITIS: ICD-10-CM

## 2025-05-02 DIAGNOSIS — L20.89 OTHER ATOPIC DERMATITIS: ICD-10-CM

## 2025-05-08 DIAGNOSIS — F41.1 GAD (GENERALIZED ANXIETY DISORDER): ICD-10-CM

## 2025-05-08 RX ORDER — ESCITALOPRAM OXALATE 20 MG/1
20 TABLET ORAL DAILY
Qty: 90 TABLET | Refills: 1 | OUTPATIENT
Start: 2025-05-08 | End: 2025-11-04

## 2025-05-12 ENCOUNTER — TELEMEDICINE (OUTPATIENT)
Dept: BEHAVIORAL HEALTH | Facility: CLINIC | Age: 40
End: 2025-05-12
Payer: COMMERCIAL

## 2025-05-12 ENCOUNTER — APPOINTMENT (OUTPATIENT)
Dept: BEHAVIORAL HEALTH | Facility: CLINIC | Age: 40
End: 2025-05-12
Payer: COMMERCIAL

## 2025-05-12 DIAGNOSIS — R23.8 SKIN IRRITATION: ICD-10-CM

## 2025-05-12 DIAGNOSIS — F41.1 GAD (GENERALIZED ANXIETY DISORDER): ICD-10-CM

## 2025-05-12 PROCEDURE — 1036F TOBACCO NON-USER: CPT

## 2025-05-12 PROCEDURE — 99214 OFFICE O/P EST MOD 30 MIN: CPT

## 2025-05-12 RX ORDER — ESCITALOPRAM OXALATE 20 MG/1
20 TABLET ORAL DAILY
Qty: 30 TABLET | Refills: 5 | Status: SHIPPED | OUTPATIENT
Start: 2025-05-12 | End: 2025-11-08

## 2025-05-12 RX ORDER — HYDROXYZINE HYDROCHLORIDE 25 MG/1
25 TABLET, FILM COATED ORAL EVERY 4 HOURS PRN
Qty: 360 TABLET | Refills: 2 | Status: SHIPPED | OUTPATIENT
Start: 2025-05-12 | End: 2025-11-08

## 2025-05-12 ASSESSMENT — ENCOUNTER SYMPTOMS
NERVOUS/ANXIOUS: 1
CONSTITUTIONAL NEGATIVE: 1

## 2025-05-12 NOTE — PROGRESS NOTES
"Impression : Reports increased anxiety related to running out of her dermatology medications. Explains that she sent messages to her dermatology office and nobody has been replying for months. MIKE ordered Hydroxyzine 25 mg  PRN for anxiety, continued Lexapro 20 mg for anxiety and sent a referral for a new dermatologist.     I performed this visit using real-time telehealth tools, including an AUDIO/VIDEO connection between aCrie PAREDES Anthonymalathi \"Sherin\" who is at Work and KAYLI Espana who is at At home office working via Telehealth.  Virtual or Telephone Consent    An interactive audio and video telecommunication system which permits real time communications between the patient (at the originating site) and provider (at the distant site) was utilized to provide this telehealth service.   Verbal consent was requested and obtained from Carie PAREDES Stevekaden on this date, 05/12/25 for a telehealth visit and the patient's location was confirmed at the time of the visit.      Assessment/Plan     Impression:  Carie Mckinnon \"Susan" is a 39 y.o. female who presents via telehealth visit for a follow up visit with CC of  \" My skin is causing a lot of anxiety. My dermatologist has not been answering my messages.I don't have my Tacrolimus and my Rinvoq. Other than that I am okay. \"    Patient consents to treatment.    Problem List Items Addressed This Visit           ICD-10-CM    MANOJ (generalized anxiety disorder) F41.1    Relevant Medications    hydrOXYzine HCL (Atarax) 25 mg tablet    escitalopram (Lexapro) 20 mg tablet    Other Relevant Orders    Follow Up In Psychiatry     Other Visit Diagnoses         Codes      Skin irritation     R23.8    Relevant Orders    Referral to Dermatology            Patient is reminded that if there is SI to call [phone] and get themselves to the closest ED for evaluation, otherwise contact me for other questions/concerns.    Patient presenting to outpatient treatment for a scheduled psych " follow up visit.      HPI   Background:   Patient presenting to outpatient treatment for a scheduled psych follow up visit.  Patient is at work for the appointment.  Patient was seen a couple of weeks ago for management of  MANOJ.  Last visit the dose of Lexapro was continued.  Patient reports the medication has helped with anxiety but her current skin conditions is not helping the anxiety.  Patient hopes to feel less anxious and get her skin medications refilled by dermatology. Explains she has been messaging her dermatology office and nobody is responding.    Characteristics/Recent psychiatric symptoms (pertinent positives and negatives):    Patient reports still having symptoms of anxiety due to not having refills for her Rinvoq and Tacrolimus. Denies depressive symptoms.  Reports getting 3-4 hours of sleep at night. Appetite is normal.  Denies wishes for death or consideration for suicide.  CSSRS negative. Denies mirella of psychosis. Denies hx hospitalization.   Patient does explain that current dose of Lexapro  has been successful in the management of anxiety and depressive symptoms.     -SI/HI : Denies        Psychiatric Review Of Systems:  Depressive Symptoms: negative  Manic Symptoms: negative    Anxiety Symptoms: General Anxiety Disorder (MANOJ)MANOJ Behaviors: difficult to control worry and excessive anxiety/worry  Psychotic Symptoms: negative      REVIEW OF SYSTEMS  Review of Systems   Constitutional: Negative.    Psychiatric/Behavioral:  The patient is nervous/anxious.      All other ROS negative    Current Medications:  Current Medications[1]     Medical History:  Medical History[2]  Surgical History:  Surgical History[3]  Family History:  Family History[4]  Social History:  Social History     Socioeconomic History    Marital status:      Spouse name: Not on file    Number of children: Not on file    Years of education: Not on file    Highest education level: Not on file   Occupational History    Not  on file   Tobacco Use    Smoking status: Never     Passive exposure: Never    Smokeless tobacco: Never   Vaping Use    Vaping status: Never Used   Substance and Sexual Activity    Alcohol use: Yes     Alcohol/week: 4.0 standard drinks of alcohol     Types: 4 Standard drinks or equivalent per week     Comment: 2-4 of wine 0nce or twice a week    Drug use: Never    Sexual activity: Yes     Partners: Male     Birth control/protection: I.U.D.   Other Topics Concern    Not on file   Social History Narrative    Not on file     Social Drivers of Health     Financial Resource Strain: Low Risk  (8/25/2024)    Overall Financial Resource Strain (CARDIA)     Difficulty of Paying Living Expenses: Not hard at all   Food Insecurity: No Food Insecurity (8/19/2020)    Received from [facility]    Hunger Vital Sign     Worried About Running Out of Food in the Last Year: Never true     Ran Out of Food in the Last Year: Never true   Transportation Needs: No Transportation Needs (8/25/2024)    PRAPARE - Transportation     Lack of Transportation (Medical): No     Lack of Transportation (Non-Medical): No   Physical Activity: Insufficiently Active (8/19/2020)    Received from [facility]    Exercise Vital Sign     Days of Exercise per Week: 3 days     Minutes of Exercise per Session: 30 min   Stress: Stress Concern Present (8/19/2020)    Received from [facility]    [employer] - Occupational Stress Questionnaire     Feeling of Stress : Very much   Social Connections: Moderately Isolated (8/19/2020)    Received from [facility]    Social Connection and Isolation Panel [NHANES]     Frequency of Communication with Friends and Family: More than three times a week     Frequency of Social Gatherings with Friends and Family: Never     Attends Adventist Services: Never     Active Member of Clubs or Organizations: No     Attends Club or Organization Meetings: Never     Marital Status:   "  Intimate Partner Violence: Not on file   Housing Stability: Low Risk  (8/25/2024)    Housing Stability Vital Sign     Unable to Pay for Housing in the Last Year: No     Number of Times Moved in the Last Year: 1     Homeless in the Last Year: No     Vitals:  There were no vitals filed for this visit.  Allergies:  RX Allergies[5]    -PCP: Evelyne Millard MD  -Pt reports currently is not pregnant  -TBI/head trauma/LOC/seizure hx: Denies    Objective   Appearance: Well groomed    Attitude: Cooperative, and engaged in conversation.    Behavior: Appropriate eye contact.     Motor Activity: No psychomotor agitation or retardation. No abnormal movements, tremors or tics. No evidence of extrapyramidal symptoms or tardive dyskinesia.    Speech: Regular rate, rhythm, volume. Spontaneous, no pressured speech.    Mood:  \" I am okay \"    Affect: Full range, mood congruent.    Thought Process: Linear, logical, and goal-directed. No loose associations or gross thought disorganization.    Thought Content:  Denied current suicidal ideation or thoughts of harm to self, denied homicidal ideation or thoughts of harm to others. No delusional thinking elicited. No perseverations or obsessions identified.     Perception: Did not endorse auditory or visual hallucinations, did not appear to be responding to hallucinatory stimuli.     Cognition: Alert, oriented x3. Preserved attention span and concentration, recent and remote memory. Adequate fund of knowledge. No deficits in language.     Insight: Fair, in regards to understanding mental health condition    Judgement: Fair        Physical Exam      -Diagnoses and all orders for this visit:  MANOJ (generalized anxiety disorder)  -     hydrOXYzine HCL (Atarax) 25 mg tablet; Take 1 tablet (25 mg) by mouth every 4 hours if needed for anxiety.  -     escitalopram (Lexapro) 20 mg tablet; Take 1 tablet (20 mg) by mouth once daily.  -     Follow Up In Psychiatry; Future  Skin irritation  -     " Referral to Dermatology         MEDICAL-DECISION MAKING    -Patient educated and verbalized understanding on benefits, risks, and side effects of Hydroxyzine.     Psych med regimen as follows:  - START Hydroxyzine 25 mg PRN up to 4 times a day for anxiety  -CONTINUE current psychiatric medications as prescribed : Lexapro 20 mg daily for MANOJ  -CONTINUE exercising and eating heathy diet  -Safety plan reviewed  -READ attached information about the prescribed new medication   -CALL OFFICE IN CASE OF SIDE EFFECT TO SCHEDULE A VISIT FOR ASSESSMENT -[phone]    /HI ASSESSMENT  -Patient denied current suicidal ideation or thoughts of harm to self, denied homicidal ideation or thoughts of harm to others. No delusional thinking elicited. No perseverations or obsessions identified.     PLAN  -Continue Medications as directed.  -Follow-up with this provider in 16 weeks.  -Risks/benefits/assessment of medication interventions discussed with pt; pt agreeable to plan. Will continue to monitor for symptoms mgmt and SEs and adjust plan as needed.  -MI to increase coping skills/behavior regulation.  -Safety plan reviewed.  -Call  Psychiatry at [phone] with issues.  -For [facility], [employer] is a 24/7 hotline you can call for assistance at [phone] 297-2893. Please call 911 or go to your closest Emergency Room if you feel worse. This includes thoughts of hurting yourself or anyone else, or having other troubles such as hearing voices, seeing visions, or having new and scary thoughts about the people around you.    OARRS:  KAYLI Espana on 5/12/2025  1:30 PM  I have personally reviewed the OARRS report for Carie Mckinnon. I have considered the risks of abuse, dependence, addiction and diversion  Medication is felt to be clinically appropriate based on documented diagnosis.      KAYLI Espana  Record Review: extensive  CALL OFFICE IN CASE OF SIDE EFFECT TO SCHEDULE A VISIT FOR  ASSESSMENT -[phone]  Follow up:   September 23rd at 130 virtually  Time Spent:  Prep:   Direct time: 24 minutes  Documentation:  4 minutes  Total: 28 minutes       [1]   Current Outpatient Medications:     albuterol 90 mcg/actuation inhaler, Inhale 1 puff every 6 hours if needed., Disp: , Rfl:     amLODIPine (Norvasc) 5 mg tablet, Take 1 tablet (5 mg) by mouth once daily., Disp: 30 tablet, Rfl: 5    escitalopram (Lexapro) 20 mg tablet, Take 1 tablet (20 mg) by mouth once daily., Disp: 90 tablet, Rfl: 1    fluticasone (Flonase) 50 mcg/actuation nasal spray, Administer 1 spray into each nostril once daily., Disp: 16 g, Rfl: 0    fluticasone propion-salmeteroL (Advair Diskus) 250-50 mcg/dose diskus inhaler, Inhale 1 puff 2 times a day. Rinse mouth with water after use to reduce aftertaste and incidence of candidiasis. Do not swallow., Disp: 60 each, Rfl: 11    gabapentin (Neurontin) 300 mg capsule, Take 1 capsule (300 mg) by mouth 2 times a day., Disp: 20 capsule, Rfl: 0    tacrolimus (Protopic) 0.1 % ointment, APPLY TWICE DAILY topically to the areas OF eczema, Disp: 30 g, Rfl: 6    escitalopram (Lexapro) 20 mg tablet, Take 1 tablet (20 mg) by mouth once daily., Disp: 30 tablet, Rfl: 5    hydrOXYzine HCL (Atarax) 25 mg tablet, Take 1 tablet (25 mg) by mouth every 4 hours if needed for anxiety., Disp: 360 tablet, Rfl: 2    upadacitinib ER (Rinvoq) 15 mg tablet extended release 24 hr, Take 1 tablet (15 mg) by mouth once daily. (Patient not taking: Reported on 5/12/2025), Disp: 30 tablet, Rfl: 2  [2]   Past Medical History:  Diagnosis Date    Personal history of other diseases of the circulatory system     History of SBE (subacute bacterial endocarditis)    Unspecified cataract     Cataracts, both eyes   [3]   Past Surgical History:  Procedure Laterality Date    ANKLE SURGERY Bilateral     OTHER SURGICAL HISTORY  07/21/2022    No history of surgery   [4]   Family History  Problem Relation Name Age of Onset     Breast cancer Mother      Colon cancer Father      Allergies Other     [5]   Allergies  Allergen Reactions    Other Anaphylaxis     Any type of steroids, pt will break out in generalized rash    Peanut Unknown and Anaphylaxis     Pt reports anaphylaxis reaction, swelling.    Fish Containing Products Swelling and Unknown    Gluten Protein Other and Diarrhea    Penicillins Other     fever    Soy Swelling and Unknown    Tree Nut Unknown     Pt reports anaphylaxis reaction, swelling.    Corticosteroids (Glucocorticoids) Rash     Severe dermatitis from steroid withdrawal    Patient reports flare of her atopic dermatitis after cessation of steroids, but has never had a reaction to prednisone    Prednisone Rash     Patient reports flare of her atopic dermatitis after cessation of steroids, but has never had a reaction to prednisone

## 2025-05-12 NOTE — PROGRESS NOTES
"Impression : {JGIMPRESSIONLIST:66615}    I performed this visit using real-time telehealth tools, including an {AUDIOVIDEO:16767} connection between Carie Mckinnon \"Sherin\" who is at {patient location:[address]} and Reema KAYLI Lucas who is at {Provider location:[address]70}.  Virtual or Telephone Consent    {Complete for a virtual or telephone visit:74607}  {Select who provided consent:08574}      Assessment/Plan     Impression:  Carie Mckinnon \"Susan" is a 39 y.o. female who presents via telehealth visit for a follow up visit with CC of No chief complaint on file..     Patient consents to treatment.    {Assess/PlanSmartLinks:40107}    Patient is reminded that if there is SI to call [phone] and get themselves to the closest ED for evaluation, otherwise contact me for other questions/concerns.    Patient presenting to outpatient treatment for a scheduled psych follow up visit.      HPI   Background:   Patient presenting to outpatient treatment for a scheduled psych follow up visit.  [facility].  Patient was seen a couple of weeks ago for management of ****  Patient was started on ***. Last visit the dose of *** was continued {Dose:97517}  Patient reports the medication {JGHAVE:12037} helped with ****  Patient hopes to ***    Characteristics/Recent psychiatric symptoms (pertinent positives and negatives):    Patient reports still having symptoms of *****  Reports getting *** hours of sleep at night. Appetite is ****  Denies wishes for death or consideration for suicide.  CSSRS negative. Denies mirella of psychosis. Denies hx hospitalization.   Patient does explain that current dose of ***  {JGHAVE:79405} been successful in the management of anxiety and depressive symptoms.     -SI/HI : {Livingsituation:63158}        Psychiatric Review Of Systems:  Depressive Symptoms: {Depression:80733823}  Manic Symptoms: {Mirella:12785858}  {MANIALIST:01335}  Anxiety Symptoms: " {Anxiety:79185183}  Psychotic Symptoms: {Psychosis:02755843}      REVIEW OF SYSTEMS  Review of Systems  All other ROS negative    Current Medications:  Current Medications[1]     Medical History:  Medical History[2]  Surgical History:  Surgical History[3]  Family History:  Family History[4]  Social History:  Social History     Socioeconomic History    Marital status:      Spouse name: Not on file    Number of children: Not on file    Years of education: Not on file    Highest education level: Not on file   Occupational History    Not on file   Tobacco Use    Smoking status: Never     Passive exposure: Never    Smokeless tobacco: Never   Vaping Use    Vaping status: Never Used   Substance and Sexual Activity    Alcohol use: Yes     Alcohol/week: 4.0 standard drinks of alcohol     Types: 4 Standard drinks or equivalent per week     Comment: 2-4 of wine 0nce or twice a week    Drug use: Never    Sexual activity: Yes     Partners: Male     Birth control/protection: I.U.D.   Other Topics Concern    Not on file   Social History Narrative    Not on file     Social Drivers of Health     Financial Resource Strain: Low Risk  (8/25/2024)    Overall Financial Resource Strain (CARDIA)     Difficulty of Paying Living Expenses: Not hard at all   Food Insecurity: No Food Insecurity (8/19/2020)    Received from [facility]    Hunger Vital Sign     Worried About Running Out of Food in the Last Year: Never true     Ran Out of Food in the Last Year: Never true   Transportation Needs: No Transportation Needs (8/25/2024)    PRAPARE - Transportation     Lack of Transportation (Medical): No     Lack of Transportation (Non-Medical): No   Physical Activity: Insufficiently Active (8/19/2020)    Received from [facility]    Exercise Vital Sign     Days of Exercise per Week: 3 days     Minutes of Exercise per Session: 30 min   Stress: Stress Concern Present (8/19/2020)    Received from [facility]  Occupational Health - Occupational Stress Questionnaire     Feeling of Stress : Very much   Social Connections: Moderately Isolated (8/19/2020)    Received from [facility]    Social Connection and Isolation Panel [NHANES]     Frequency of Communication with Friends and Family: More than three times a week     Frequency of Social Gatherings with Friends and Family: Never     Attends Zoroastrianism Services: Never     Active Member of Clubs or Organizations: No     Attends Club or Organization Meetings: Never     Marital Status:    Intimate Partner Violence: Not on file   Housing Stability: Low Risk  (8/25/2024)    Housing Stability Vital Sign     Unable to Pay for Housing in the Last Year: No     Number of Times Moved in the Last Year: 1     Homeless in the Last Year: No     Vitals:  There were no vitals filed for this visit.  Allergies:  RX Allergies[5]    -PCP: Evelyne Millard MD  -Pt reports currently {KCISNOT:70985} pregnant, and currently {KCISNOT:30844} sexually active, {KCBC:56187} birth control. LMP: ***  -TBI/head trauma/LOC/seizure hx: ***    Objective   Appearance: ***    Attitude: Calm, cooperative, and engaged in conversation.    Behavior: Appropriate eye contact.     Motor Activity: No psychomotor agitation or retardation. No abnormal movements, tremors or tics. No evidence of extrapyramidal symptoms or tardive dyskinesia.    Speech: Regular rate, rhythm, volume. Spontaneous, no pressured speech.    Mood: ***    Affect: Euthymic, full range, mood congruent.    Thought Process: Linear, logical, and goal-directed. No loose associations or gross thought disorganization.    Thought Content: *** Denied current suicidal ideation or thoughts of harm to self, denied homicidal ideation or thoughts of harm to others. No delusional thinking elicited. No perseverations or obsessions identified.     Perception: Did not endorse auditory or visual hallucinations, did not appear to be responding to hallucinatory  stimuli.     Cognition: Alert, oriented x3. Preserved attention span and concentration, recent and remote memory. Adequate fund of knowledge. No deficits in language.     Insight: Fair, in regards to understanding mental health condition    Judgement: Fair        Physical Exam      -There are no diagnoses linked to this encounter.       MEDICAL-DECISION MAKING    -Patient educated and verbalized understanding on benefits, risks, and side effects of ***.     Psych med regimen as follows:  -{PLAN:30048}  -{PLAN:16057}  -{PLAN:13964}  -{PLAN:05829}  -{PLAN:17416}  -{PLAN:91042}  -{PLAN:62035}  -{PLAN:83250}  -Safety plan reviewed  -READ attached information about the prescribed new medication   -CALL OFFICE IN CASE OF SIDE EFFECT TO SCHEDULE A VISIT FOR ASSESSMENT -[phone]    /HI ASSESSMENT  -Patient denied current suicidal ideation or thoughts of harm to self, denied homicidal ideation or thoughts of harm to others. No delusional thinking elicited. No perseverations or obsessions identified.     PLAN  -Continue Medications as directed.  -Follow-up with this provider in *** weeks.  -Risks/benefits/assessment of medication interventions discussed with pt; pt agreeable to plan. Will continue to monitor for symptoms mgmt and SEs and adjust plan as needed.  -MI to increase coping skills/behavior regulation.  -Safety plan reviewed.  -Call  Psychiatry at [phone] with issues.  -For [facility] residents, [employer] is a 24/7 hotline you can call for assistance at [phone]) [phone]. Please call 911 or go to your closest Emergency Room if you feel worse. This includes thoughts of hurting yourself or anyone else, or having other troubles such as hearing voices, seeing visions, or having new and scary thoughts about the people around you.    OARRS:  No data recorded  {OARRSReview:49585}  Medication is felt to be clinically appropriate based on documented diagnosis.      EVA Espana-CNP  Record  Review: {brief/mod/extensive:10178}  **** CALL OFFICE IN CASE OF SIDE EFFECT TO SCHEDULE A VISIT FOR ASSESSMENT -[phone]  Follow up:   No follow-ups on file.  ****GET YOUR VITALS****  Time Spent:  Prep:   Direct time: minutes  Documentation:  minutes  Total: minutes       [1]   Current Outpatient Medications:     albuterol 90 mcg/actuation inhaler, Inhale 1 puff every 6 hours if needed., Disp: , Rfl:     amLODIPine (Norvasc) 5 mg tablet, Take 1 tablet (5 mg) by mouth once daily., Disp: 30 tablet, Rfl: 5    escitalopram (Lexapro) 20 mg tablet, Take 1 tablet (20 mg) by mouth once daily., Disp: 90 tablet, Rfl: 1    fluticasone (Flonase) 50 mcg/actuation nasal spray, Administer 1 spray into each nostril once daily., Disp: 16 g, Rfl: 0    fluticasone propion-salmeteroL (Advair Diskus) 250-50 mcg/dose diskus inhaler, Inhale 1 puff 2 times a day. Rinse mouth with water after use to reduce aftertaste and incidence of candidiasis. Do not swallow., Disp: 60 each, Rfl: 11    gabapentin (Neurontin) 300 mg capsule, Take 1 capsule (300 mg) by mouth 2 times a day., Disp: 20 capsule, Rfl: 0    tacrolimus (Protopic) 0.1 % ointment, APPLY TWICE DAILY topically to the areas OF eczema, Disp: 30 g, Rfl: 6    upadacitinib ER (Rinvoq) 15 mg tablet extended release 24 hr, Take 1 tablet (15 mg) by mouth once daily., Disp: 30 tablet, Rfl: 2  [2]   Past Medical History:  Diagnosis Date    Personal history of other diseases of the circulatory system     History of SBE (subacute bacterial endocarditis)    Unspecified cataract     Cataracts, both eyes   [3]   Past Surgical History:  Procedure Laterality Date    ANKLE SURGERY Bilateral     OTHER SURGICAL HISTORY  07/21/2022    No history of surgery   [4]   Family History  Problem Relation Name Age of Onset    Breast cancer Mother      Colon cancer Father      Allergies Other     [5]   Allergies  Allergen Reactions    Other Anaphylaxis     Any type of steroids, pt will break out in  generalized rash    Peanut Unknown and Anaphylaxis     Pt reports anaphylaxis reaction, swelling.    Fish Containing Products Swelling and Unknown    Gluten Protein Other and Diarrhea    Penicillins Other     fever    Soy Swelling and Unknown    Tree Nut Unknown     Pt reports anaphylaxis reaction, swelling.    Corticosteroids (Glucocorticoids) Rash     Severe dermatitis from steroid withdrawal    Patient reports flare of her atopic dermatitis after cessation of steroids, but has never had a reaction to prednisone    Prednisone Rash     Patient reports flare of her atopic dermatitis after cessation of steroids, but has never had a reaction to prednisone

## 2025-05-13 ENCOUNTER — APPOINTMENT (OUTPATIENT)
Dept: BEHAVIORAL HEALTH | Facility: CLINIC | Age: 40
End: 2025-05-13
Payer: COMMERCIAL

## 2025-05-13 DIAGNOSIS — L30.9 DERMATITIS, UNSPECIFIED: ICD-10-CM

## 2025-05-13 RX ORDER — TACROLIMUS 1 MG/G
OINTMENT TOPICAL 2 TIMES DAILY
Qty: 30 G | Refills: 0 | Status: SHIPPED | OUTPATIENT
Start: 2025-05-13

## 2025-05-21 ENCOUNTER — TELEPHONE (OUTPATIENT)
Dept: PRIMARY CARE | Facility: CLINIC | Age: 40
End: 2025-05-21
Payer: COMMERCIAL

## 2025-05-21 NOTE — TELEPHONE ENCOUNTER
Pt called, lvm, she is asking for an excused note for work due to having a seizure on 5/19 and 5/20.

## 2025-05-28 ASSESSMENT — DERMATOLOGY QUALITY OF LIFE (QOL) ASSESSMENT
WHAT SINGLE SKIN CONDITION LISTED BELOW IS THE PATIENT ANSWERING THE QUALITY-OF-LIFE ASSESSMENT QUESTIONS ABOUT: DERMATITIS
RATE HOW BOTHERED YOU ARE BY SYMPTOMS OF YOUR SKIN PROBLEM (EG, ITCHING, STINGING BURNING, HURTING OR SKIN IRRITATION): 6 - ALWAYS BOTHERED
RATE HOW BOTHERED YOU ARE BY SYMPTOMS OF YOUR SKIN PROBLEM (EG, ITCHING, STINGING BURNING, HURTING OR SKIN IRRITATION): 6 - ALWAYS BOTHERED
RATE HOW EMOTIONALLY BOTHERED YOU ARE BY YOUR SKIN PROBLEM (FOR EXAMPLE, WORRY, EMBARRASSMENT, FRUSTRATION): 5
RATE HOW BOTHERED YOU ARE BY EFFECTS OF YOUR SKIN PROBLEMS ON YOUR ACTIVITIES (EG, GOING OUT, ACCOMPLISHING WHAT YOU WANT, WORK ACTIVITIES OR YOUR RELATIONSHIPS WITH OTHERS): 4
RATE HOW EMOTIONALLY BOTHERED YOU ARE BY YOUR SKIN PROBLEM (FOR EXAMPLE, WORRY, EMBARRASSMENT, FRUSTRATION): 5
RATE HOW BOTHERED YOU ARE BY EFFECTS OF YOUR SKIN PROBLEMS ON YOUR ACTIVITIES (EG, GOING OUT, ACCOMPLISHING WHAT YOU WANT, WORK ACTIVITIES OR YOUR RELATIONSHIPS WITH OTHERS): 4
WHAT SINGLE SKIN CONDITION LISTED BELOW IS THE PATIENT ANSWERING THE QUALITY-OF-LIFE ASSESSMENT QUESTIONS ABOUT: DERMATITIS

## 2025-05-28 ASSESSMENT — PATIENT GLOBAL ASSESSMENT (PGA): WHAT IS THE PGA: PATIENT GLOBAL ASSESSMENT:  3 - MODERATE

## 2025-05-29 ENCOUNTER — APPOINTMENT (OUTPATIENT)
Dept: DERMATOLOGY | Facility: CLINIC | Age: 40
End: 2025-05-29
Payer: COMMERCIAL

## 2025-05-29 DIAGNOSIS — L20.89 OTHER ATOPIC DERMATITIS: ICD-10-CM

## 2025-05-29 DIAGNOSIS — L20.9 ATOPIC DERMATITIS, UNSPECIFIED TYPE: Primary | ICD-10-CM

## 2025-05-29 PROCEDURE — 99214 OFFICE O/P EST MOD 30 MIN: CPT | Performed by: DERMATOLOGY

## 2025-05-29 RX ORDER — TACROLIMUS 1 MG/G
OINTMENT TOPICAL 2 TIMES DAILY
Qty: 30 G | Refills: 3 | Status: SHIPPED | OUTPATIENT
Start: 2025-05-29

## 2025-05-29 NOTE — Clinical Note
Atopic dermatitis  - Previously on topical steroids and Dupixent with sub optimal control  - Had excellent response to Rinvoq when started in [id number]. Due to coverage issues, has not been on Rinvoq for the last 2-3 months and has since had flare. Recently got coverage again and is able to restart.  - CONTINUE with Rinvoq 15 mg ER tablet once daily. Reviewed risks include but are not limited to severe cardiac events (usually in patients > 49yo with RA though), infections, malignancy, hyperlipidemia and others.  - CONTINUE with tacrolimus 0.1% ointment as needed. Adamant she does not wish to use any topical steroids.   - Recent labs reviewed, including lipid panel significant for elevated .  - Placed nutrition consult to recommend dietary changes to reduce LDL and risk of cardiovascular events given she is currently on Rinvoq.

## 2025-05-29 NOTE — LETTER
June 1, 2025     Patient: Carie Mckinnon   YOB: 1985   Date of Visit: 5/29/2025       To Whom It May Concern:    It is my medical opinion that Ms. Mckinnon may return to work on: May 30, 2025 without limitations. Neurology has evaluated this patient with the opinion that her skin disease plays an important role in triggering episodes of her stress-related seizures. Patient will now be restarting treatment for her skin disease, and we expect her to respond well as she has in the past.    If you have any questions or concerns, please don't hesitate to call.         Sincerely,        Jose Bear MD PhD        CC: No Recipients

## 2025-06-02 RX ORDER — UPADACITINIB 15 MG/1
TABLET, EXTENDED RELEASE ORAL
Qty: 30 TABLET | Refills: 11 | Status: SHIPPED | OUTPATIENT
Start: 2025-06-02

## 2025-06-03 DIAGNOSIS — L20.89 OTHER ATOPIC DERMATITIS: ICD-10-CM

## 2025-06-06 DIAGNOSIS — L20.89 OTHER ATOPIC DERMATITIS: ICD-10-CM

## 2025-06-17 ENCOUNTER — APPOINTMENT (OUTPATIENT)
Dept: BEHAVIORAL HEALTH | Facility: CLINIC | Age: 40
End: 2025-06-17
Payer: COMMERCIAL

## 2025-06-18 RX ORDER — UPADACITINIB 15 MG/1
TABLET, EXTENDED RELEASE ORAL
Qty: 30 TABLET | Refills: 11 | Status: SHIPPED | OUTPATIENT
Start: 2025-06-18

## 2025-07-02 ENCOUNTER — TELEMEDICINE (OUTPATIENT)
Dept: BEHAVIORAL HEALTH | Facility: CLINIC | Age: 40
End: 2025-07-02
Payer: COMMERCIAL

## 2025-07-02 DIAGNOSIS — F41.1 GAD (GENERALIZED ANXIETY DISORDER): ICD-10-CM

## 2025-07-02 RX ORDER — ESCITALOPRAM OXALATE 10 MG/1
10 TABLET ORAL DAILY
Qty: 3 TABLET | Refills: 0 | Status: SHIPPED | OUTPATIENT
Start: 2025-07-02 | End: 2025-07-05

## 2025-07-02 ASSESSMENT — ENCOUNTER SYMPTOMS
CONSTITUTIONAL NEGATIVE: 1
PSYCHIATRIC NEGATIVE: 1

## 2025-07-02 NOTE — PROGRESS NOTES
"Impression : Patient reports improvement in anxiety symptoms. Reports worsening parasomnia lately and bizarre sleep walking at night. Reports she was wondering if it is related to taking Lexapro. Patient is currently taking lexapro 20 mg daily. Explains that the medication has helped her with anxiety symptoms. MIKE instructed patient to taper Lexapro by taking 10 mg daily for 3 days, then 5 mg daily for 3 days then stop for a couple of days and message provider on July 14 th with updates on what is happening with the parasomnia and sleep walking symptoms.    I performed this visit using real-time telehealth tools, including an AUDIO/VIDEO connection between Carie PAREDES Ino \"Sherin\" who is at Home and KAYLI Espana who is at [facility].  Virtual or Telephone Consent    An interactive audio and video telecommunication system which permits real time communications between the patient (at the originating site) and provider (at the distant site) was utilized to provide this telehealth service.   Verbal consent was requested and obtained from Carie P Ino on this date, 07/02/25 for a telehealth visit and the patient's location was confirmed at the time of the visit.      Assessment/Plan     Impression:  Carie Mckinnon \"Susan" is a 39 y.o. female who presents via telehealth visit for a follow up visit with [facility] of  \" I was wondering if the lexapro was causing my symptoms. \"    Patient consents to treatment.    Problem List Items Addressed This Visit           ICD-10-CM    MANOJ (generalized anxiety disorder) F41.1    Relevant Medications    escitalopram (Lexapro) 10 mg tablet       Patient is reminded that if there is SI to call [phone] and get themselves to the closest ED for evaluation, otherwise contact me for other questions/concerns.    Patient presenting to outpatient treatment for a scheduled psych follow up visit.      HPI   Background:   Patient presenting to outpatient treatment for a " scheduled psych follow up visit with her mother.  Patient is at home for the appointment.  Patient was seen a couple of weeks ago for management of MANOJ.   Last visit the dose of Lexapro 20 mg  was continued.  Patient reports the medication have helped with anxiety management. Reports worsening parasomnia lately and bizarre sleep walking at night. Patient's mother explains that she has had these symptoms in the past before the Lexapro and they are unsure if the lexapro is worsening it.   Patient hopes to feel less worried.     Characteristics/Recent psychiatric symptoms (pertinent positives and negatives):    Patient reports improvement in anxiety symptoms, but reports that she is worried about the Lexapro situation and wondering if it is causing the parasomnia and sleep walking. Denies panic attacks. Denies depressive symptoms.  Reports getting 3 hours of sleep at night. Appetite is decreased  Denies wishes for death or consideration for suicide.  CSSRS negative. Denies mirella of psychosis. Denies hx hospitalization.   Patient does explain that current dose of Lexapro 20 mg  have   been successful in the management of anxiety and depression  symptoms.     -SI/HI : Denies        Psychiatric Review Of Systems:  Depressive Symptoms: negative  Manic Symptoms: negative    Anxiety Symptoms: Negative  Psychotic Symptoms: negative      REVIEW OF SYSTEMS  Review of Systems   Constitutional: Negative.    Psychiatric/Behavioral: Negative.       All other ROS negative    Current Medications:  Current Medications[1]     Medical History:  Medical History[2]  Surgical History:  Surgical History[3]  Family History:  Family History[4]  Social History:  Social History     Socioeconomic History    Marital status:      Spouse name: Not on file    Number of children: Not on file    Years of education: Not on file    Highest education level: Not on file   Occupational History    Not on file   Tobacco Use    Smoking status: Never      Passive exposure: Never    Smokeless tobacco: Never   Vaping Use    Vaping status: Never Used   Substance and Sexual Activity    Alcohol use: Yes     Alcohol/week: 4.0 standard drinks of alcohol     Types: 4 Standard drinks or equivalent per week     Comment: 2-4 of wine 0nce or twice a week    Drug use: Never    Sexual activity: Yes     Partners: Male     Birth control/protection: I.U.D.   Other Topics Concern    Not on file   Social History Narrative    Not on file     Social Drivers of Health     Financial Resource Strain: Low Risk  (8/25/2024)    Overall Financial Resource Strain (CARDIA)     Difficulty of Paying Living Expenses: Not hard at all   Food Insecurity: No Food Insecurity (8/19/2020)    Received from [facility]    Hunger Vital Sign     Worried About Running Out of Food in the Last Year: Never true     Ran Out of Food in the Last Year: Never true   Transportation Needs: No Transportation Needs (8/25/2024)    PRAPARE - Transportation     Lack of Transportation (Medical): No     Lack of Transportation (Non-Medical): No   Physical Activity: Insufficiently Active (8/19/2020)    Received from [facility]    Exercise Vital Sign     Days of Exercise per Week: 3 days     Minutes of Exercise per Session: 30 min   Stress: Stress Concern Present (8/19/2020)    Received from [facility]    [employer] [place] [employer] - Occupational Stress Questionnaire     Feeling of Stress : Very much   Social Connections: Moderately Isolated (8/19/2020)    Received from [facility]    Social Connection and Isolation Panel [NHANES]     Frequency of Communication with Friends and Family: More than three times a week     Frequency of Social Gatherings with Friends and Family: Never     Attends Zoroastrian Services: Never     Active Member of Clubs or Organizations: No     Attends Club or Organization Meetings: Never     Marital Status:    Intimate Partner Violence: Not on file   Housing  "Stability: Low Risk  (8/25/2024)    Housing Stability Vital Sign     Unable to Pay for Housing in the Last Year: No     Number of Times Moved in the Last Year: 1     Homeless in the Last Year: No     Vitals:  There were no vitals filed for this visit.  Allergies:  RX Allergies[5]    -PCP: Evelyne Millard MD  -Pt reports currently is not pregnant  -TBI/head trauma/LOC/seizure hx: Reports recent seizure and  that her neurologist is aware.    Objective   Appearance: Well groomed    Attitude: Cooperative, and engaged in conversation.    Behavior: Appropriate eye contact.     Motor Activity: No psychomotor agitation or retardation. No abnormal movements, tremors or tics. No evidence of extrapyramidal symptoms or tardive dyskinesia.    Speech: Regular rate, rhythm, volume. Spontaneous, no pressured speech.    Mood:  \" I am okay \"    Affect: Full range, mood congruent.    Thought Process: Linear, logical, and goal-directed. No loose associations or gross thought disorganization.    Thought Content:  Denied current suicidal ideation or thoughts of harm to self, denied homicidal ideation or thoughts of harm to others. No delusional thinking elicited. No perseverations or obsessions identified.     Perception: Did not endorse auditory or visual hallucinations, did not appear to be responding to hallucinatory stimuli.     Cognition: Alert, oriented x3. Preserved attention span and concentration, recent and remote memory. Adequate fund of knowledge. No deficits in language.     Insight: Fair, in regards to understanding mental health condition    Judgement: Fair        Physical Exam      -Diagnoses and all orders for this visit:  MANOJ (generalized anxiety disorder)  -     escitalopram (Lexapro) 10 mg tablet; Take 1 tablet (10 mg) by mouth once daily for 3 days.         MEDICAL-DECISION MAKING    -Patient educated and verbalized understanding on benefits, risks, and side effects of Lexapro.     Psych med regimen as " follows:  -TAPER Lexapro by taking 10 mg daily  for 3 days, then 5 mg daily for 3 days, then STOP. Contact MIKE on July 14th with updates on the parasomnia and sleep walking while OFF of Lexapro  -START Psychotherapy  -CONTINUE exercising and eating heathy diet  -Safety plan reviewed  -READ attached information about the prescribed new medication   -CALL OFFICE IN CASE OF SIDE EFFECT TO SCHEDULE A VISIT FOR ASSESSMENT -[phone]    SI/HI ASSESSMENT  -Patient denied current suicidal ideation or thoughts of harm to self, denied homicidal ideation or thoughts of harm to others. No delusional thinking elicited. No perseverations or obsessions identified.     PLAN  -Continue Medications as directed.  -Follow-up with   -Risks/benefits/assessment of medication interventions discussed with pt; pt agreeable to plan. Will continue to monitor for symptoms mgmt and SEs and adjust plan as needed.  -MI to increase coping skills/behavior regulation.  -Safety plan reviewed.  -Call  Psychiatry at [phone] with issues.  -For [facility], [employer] is a 24/7 hotline you can call for assistance at [phone]. Please call 911 or go to your closest Emergency Room if you feel worse. This includes thoughts of hurting yourself or anyone else, or having other troubles such as hearing voices, seeing visions, or having new and scary thoughts about the people around you.    OARRS:  KAYLI Espana on 7/2/2025  1:35 PM  I have personally reviewed the OARRS report for Carie Mckinonn. I have considered the risks of abuse, dependence, addiction and diversion  Medication is felt to be clinically appropriate based on documented diagnosis.      KAYLI Espana  Record Review: extensive  CALL OFFICE IN CASE OF SIDE EFFECT TO SCHEDULE A VISIT FOR ASSESSMENT -[phone]  Follow up:   With   Time Spent:  Prep:   Direct time:36  minutes  Documentation: 6  minutes  Total: 42 minutes       [1]    Current Outpatient Medications:     albuterol 90 mcg/actuation inhaler, Inhale 1 puff every 6 hours if needed., Disp: , Rfl:     amLODIPine (Norvasc) 5 mg tablet, Take 1 tablet (5 mg) by mouth once daily., Disp: 30 tablet, Rfl: 5    fluticasone propion-salmeteroL (Advair Diskus) 250-50 mcg/dose diskus inhaler, Inhale 1 puff 2 times a day. Rinse mouth with water after use to reduce aftertaste and incidence of candidiasis. Do not swallow., Disp: 60 each, Rfl: 11    gabapentin (Neurontin) 300 mg capsule, Take 1 capsule (300 mg) by mouth 2 times a day., Disp: 20 capsule, Rfl: 0    Rinvoq 15 mg tablet extended release 24 hr, TAKE 1 TABLET (15MG) BY MOUTH ONE TIME DAILY, Disp: 30 tablet, Rfl: 11    tacrolimus (Protopic) 0.1 % ointment, Apply topically 2 times a day., Disp: 30 g, Rfl: 3    escitalopram (Lexapro) 10 mg tablet, Take 1 tablet (10 mg) by mouth once daily for 3 days., Disp: 3 tablet, Rfl: 0    fluticasone (Flonase) 50 mcg/actuation nasal spray, Administer 1 spray into each nostril once daily. (Patient not taking: Reported on 7/2/2025), Disp: 16 g, Rfl: 0    hydrOXYzine HCL (Atarax) 25 mg tablet, Take 1 tablet (25 mg) by mouth every 4 hours if needed for anxiety. (Patient not taking: Reported on 7/2/2025), Disp: 360 tablet, Rfl: 2    Rinvoq 15 mg tablet extended release 24 hr, TAKE 1 TABLET (15MG) BY MOUTH ONE TIME DAILY, Disp: 30 tablet, Rfl: 11  [2]   Past Medical History:  Diagnosis Date    Personal history of other diseases of the circulatory system     History of SBE (subacute bacterial endocarditis)    Unspecified cataract     Cataracts, both eyes   [3]   Past Surgical History:  Procedure Laterality Date    ANKLE SURGERY Bilateral     OTHER SURGICAL HISTORY  07/21/2022    No history of surgery   [4]   Family History  Problem Relation Name Age of Onset    Breast cancer Mother      Colon cancer Father      Allergies Other     [5]   Allergies  Allergen Reactions    Other Anaphylaxis     Any type of  steroids, pt will break out in generalized rash    Peanut Unknown and Anaphylaxis     Pt reports anaphylaxis reaction, swelling.    Fish Containing Products Swelling and Unknown    Gluten Protein Other and Diarrhea    Penicillins Other     fever    Soy Swelling and Unknown    Tree Nut Unknown     Pt reports anaphylaxis reaction, swelling.    Corticosteroids (Glucocorticoids) Rash     Severe dermatitis from steroid withdrawal    Patient reports flare of her atopic dermatitis after cessation of steroids, but has never had a reaction to prednisone    Prednisone Rash     Patient reports flare of her atopic dermatitis after cessation of steroids, but has never had a reaction to prednisone

## 2025-07-08 ENCOUNTER — APPOINTMENT (OUTPATIENT)
Dept: PRIMARY CARE | Facility: CLINIC | Age: 40
End: 2025-07-08
Payer: COMMERCIAL

## 2025-07-08 VITALS
WEIGHT: 155 LBS | HEIGHT: 62 IN | BODY MASS INDEX: 28.52 KG/M2 | DIASTOLIC BLOOD PRESSURE: 78 MMHG | SYSTOLIC BLOOD PRESSURE: 116 MMHG

## 2025-07-08 DIAGNOSIS — G40.909 SEIZURE DISORDER (MULTI): ICD-10-CM

## 2025-07-08 DIAGNOSIS — F41.1 GAD (GENERALIZED ANXIETY DISORDER): ICD-10-CM

## 2025-07-08 DIAGNOSIS — G40.409 OTHER GENERALIZED EPILEPSY AND EPILEPTIC SYNDROMES, NOT INTRACTABLE, WITHOUT STATUS EPILEPTICUS (MULTI): ICD-10-CM

## 2025-07-08 DIAGNOSIS — R56.9 SEIZURES (MULTI): ICD-10-CM

## 2025-07-08 DIAGNOSIS — D89.89 OTHER SPECIFIED DISORDERS INVOLVING THE IMMUNE MECHANISM, NOT ELSEWHERE CLASSIFIED: ICD-10-CM

## 2025-07-08 DIAGNOSIS — J45.40 MODERATE PERSISTENT ASTHMA WITHOUT COMPLICATION (HHS-HCC): Primary | ICD-10-CM

## 2025-07-08 PROCEDURE — 99214 OFFICE O/P EST MOD 30 MIN: CPT | Performed by: INTERNAL MEDICINE

## 2025-07-08 PROCEDURE — 3078F DIAST BP <80 MM HG: CPT | Performed by: INTERNAL MEDICINE

## 2025-07-08 PROCEDURE — 1036F TOBACCO NON-USER: CPT | Performed by: INTERNAL MEDICINE

## 2025-07-08 PROCEDURE — 3074F SYST BP LT 130 MM HG: CPT | Performed by: INTERNAL MEDICINE

## 2025-07-08 PROCEDURE — 3008F BODY MASS INDEX DOCD: CPT | Performed by: INTERNAL MEDICINE

## 2025-07-08 RX ORDER — FLUTICASONE PROPIONATE AND SALMETEROL 500; 50 UG/1; UG/1
1 POWDER RESPIRATORY (INHALATION)
Qty: 60 EACH | Refills: 11 | Status: SHIPPED | OUTPATIENT
Start: 2025-07-08 | End: 2026-07-08

## 2025-07-08 ASSESSMENT — PATIENT HEALTH QUESTIONNAIRE - PHQ9
2. FEELING DOWN, DEPRESSED OR HOPELESS: NOT AT ALL
SUM OF ALL RESPONSES TO PHQ9 QUESTIONS 1 AND 2: 0
1. LITTLE INTEREST OR PLEASURE IN DOING THINGS: NOT AT ALL

## 2025-07-08 NOTE — PROGRESS NOTES
"Subjective   Patient ID: Sherin Mckinnon is a 39 y.o. female who presents for sleeping issues (Nightmares, talking in sleep) and discuss meds.    HPI   Over the past several months with increasing sleep-related issues.  This occurred during time of increasing issues with atopic dermatitis-off of medicine secondary to job change.  At same time she went back on medicines and regain control of her dermatitis also began tapering escitalopram with her behavioral health provider.  Symptoms are improving but unclear which was etiology.  Anxiety somewhat increased.  Does have appointment pending next week with behavioral health.    Increasing asthma symptoms over the past 6 weeks.  Had been doing well for quite some time.  Early January with increasing symptoms, increased Advair to 250/50 with good control.  Now with some increasing symptoms.  Using albuterol 1-2 times a day.  No nighttime awakenings.  Able to be active without difficulty.  No cough.  Minimal head congestion.  Does see allergy immunology regularly.    A few years ago had echocardiogram with modestly depressed LVEF.  Following with cardiology at [facility].  Had echocardiogram a few months ago and told \"normal \".    Review of Systems    Objective   /78 (BP Location: Left arm, Patient Position: Sitting)   Ht 1.581 m (5' 2.26\")   Wt 70.3 kg (155 lb)   BMI 28.12 kg/m²     Physical Exam  Constitutional:       Appearance: Normal appearance.   Cardiovascular:      Rate and Rhythm: Normal rate and regular rhythm.      Pulses: Normal pulses.      Heart sounds: Normal heart sounds. No murmur heard.  Pulmonary:      Effort: Pulmonary effort is normal. No respiratory distress.      Breath sounds: Normal breath sounds. No wheezing, rhonchi or rales.   Neurological:      Mental Status: She is alert.   Psychiatric:         Mood and Affect: Mood normal.         Behavior: Behavior normal.         Thought Content: Thought content normal.         Judgment: Judgment normal. " "        Lab Results   Component Value Date    WBC 6.6 08/26/2024    HGB 11.8 (L) 08/26/2024    HCT 35.9 (L) 08/26/2024     08/26/2024    CHOL 198 03/30/2024    TRIG 78 03/30/2024    HDL 66.4 03/30/2024    ALT 8 08/26/2024    AST 23 08/26/2024     (L) 08/26/2024    K 3.6 08/26/2024     08/26/2024    CREATININE 0.61 08/26/2024    BUN 12 08/26/2024    CO2 22 08/26/2024    TSH 2.09 03/31/2024    HGBA1C 5.1 03/30/2024       Assessment/Plan   Assessment & Plan  Moderate persistent asthma without complication ([facility])    Orders:    fluticasone propion-salmeteroL (Advair Diskus) 500-50 mcg/dose diskus inhaler; Inhale 1 puff 2 times a day. Rinse mouth with water after use to reduce aftertaste and incidence of candidiasis. Do not swallow.    Referral to Pulmonology; Future    Seizures (Multi)         Seizure disorder (Multi)         Other generalized epilepsy and epileptic syndromes, not intractable, without status epilepticus (Multi)         Other specified disorders involving the immune mechanism, not elsewhere classified         MANOJ (generalized anxiety disorder)                 #1 Seasonal allergies-stable, con't rx  #2 Atopic dermatitis- good.  Continue current treatment.  Follow-up dermatology  #3 Asthma- moderate, persistent.  Increased s/sx.  Increase advair to 500/50, con't albuterol PRN./  c/s pulm- reviewed.   #4 Reduced EF.  Following w/ cards at [facility]- recently had echo at [facility] (told \"all good\").  Follow-up cardiology [facility]  #5 ?sz d/o- stable.  Per neuro PNES  #6 anxiety d/o- stable.  Follow-up behavioral health.  Will likely need to add additional medicine.  #7 feet-stable  #8 celiac- per pt report. Con't diet.    #9 RLS-stable.  Continue gabapentin for now    f/u  6 mths    Colonoscopy pending- reviewed importance    Mammo/PAP w/ GYN  "

## 2025-07-08 NOTE — ASSESSMENT & PLAN NOTE
Orders:    fluticasone propion-salmeteroL (Advair Diskus) 500-50 mcg/dose diskus inhaler; Inhale 1 puff 2 times a day. Rinse mouth with water after use to reduce aftertaste and incidence of candidiasis. Do not swallow.    Referral to Pulmonology; Future

## 2025-07-17 DIAGNOSIS — F41.1 GAD (GENERALIZED ANXIETY DISORDER): ICD-10-CM

## 2025-07-17 RX ORDER — FLUOXETINE 10 MG/1
10 CAPSULE ORAL DAILY
Qty: 30 CAPSULE | Refills: 11 | Status: SHIPPED | OUTPATIENT
Start: 2025-07-17 | End: 2026-07-17

## 2025-07-25 DIAGNOSIS — I10 PRIMARY HYPERTENSION: ICD-10-CM

## 2025-07-28 RX ORDER — AMLODIPINE BESYLATE 5 MG/1
5 TABLET ORAL DAILY
Qty: 30 TABLET | Refills: 5 | Status: SHIPPED | OUTPATIENT
Start: 2025-07-28

## 2025-07-29 ENCOUNTER — APPOINTMENT (OUTPATIENT)
Dept: RADIOLOGY | Facility: HOSPITAL | Age: 40
End: 2025-07-29
Payer: COMMERCIAL

## 2025-07-29 LAB
ALBUMIN SERPL BCP-MCNC: 5.2 G/DL (ref 3.4–5)
ALP SERPL-CCNC: 73 U/L (ref 33–110)
ALT SERPL W P-5'-P-CCNC: 37 U/L (ref 7–45)
ANION GAP BLDV CALCULATED.4IONS-SCNC: 16 MMOL/L (ref 10–25)
ANION GAP SERPL CALC-SCNC: 24 MMOL/L
APAP SERPL-MCNC: <10 UG/ML (ref ?–30)
AST SERPL W P-5'-P-CCNC: 55 U/L (ref 9–39)
BASE EXCESS BLDV CALC-SCNC: -0.4 MMOL/L (ref -2–3)
BASOPHILS # BLD AUTO: 0.06 X10*3/UL (ref 0–0.1)
BASOPHILS NFR BLD AUTO: 1.3 %
BILIRUB SERPL-MCNC: 0.5 MG/DL (ref 0–1.2)
BODY TEMPERATURE: 37 DEGREES CELSIUS
BUN SERPL-MCNC: 14 MG/DL (ref 6–23)
CA-I BLDV-SCNC: 1.1 MMOL/L (ref 1.1–1.33)
CALCIUM SERPL-MCNC: 9 MG/DL (ref 8.6–10.6)
CHLORIDE BLDV-SCNC: 106 MMOL/L (ref 98–107)
CHLORIDE SERPL-SCNC: 102 MMOL/L (ref 98–107)
CO2 SERPL-SCNC: 20 MMOL/L (ref 21–32)
CREAT SERPL-MCNC: 0.66 MG/DL (ref 0.5–1.05)
EGFRCR SERPLBLD CKD-EPI 2021: >90 ML/MIN/1.73M*2
EOSINOPHIL # BLD AUTO: 0.09 X10*3/UL (ref 0–0.7)
EOSINOPHIL NFR BLD AUTO: 2 %
ERYTHROCYTE [DISTWIDTH] IN BLOOD BY AUTOMATED COUNT: 14.9 % (ref 11.5–14.5)
ETHANOL SERPL-MCNC: 435 MG/DL
GLUCOSE BLDV-MCNC: 81 MG/DL (ref 74–99)
GLUCOSE SERPL-MCNC: 77 MG/DL (ref 74–99)
HCO3 BLDV-SCNC: 25.4 MMOL/L (ref 22–26)
HCT VFR BLD AUTO: 39.1 % (ref 36–46)
HCT VFR BLD EST: 41 % (ref 36–46)
HGB BLD-MCNC: 13.4 G/DL (ref 12–16)
HGB BLDV-MCNC: 13.8 G/DL (ref 12–16)
IMM GRANULOCYTES # BLD AUTO: 0.04 X10*3/UL (ref 0–0.7)
IMM GRANULOCYTES NFR BLD AUTO: 0.9 % (ref 0–0.9)
INHALED O2 CONCENTRATION: 21 %
LACTATE BLDV-SCNC: 3.2 MMOL/L (ref 0.4–2)
LYMPHOCYTES # BLD AUTO: 2.17 X10*3/UL (ref 1.2–4.8)
LYMPHOCYTES NFR BLD AUTO: 47.1 %
MCH RBC QN AUTO: 32.8 PG (ref 26–34)
MCHC RBC AUTO-ENTMCNC: 34.3 G/DL (ref 32–36)
MCV RBC AUTO: 96 FL (ref 80–100)
MONOCYTES # BLD AUTO: 0.51 X10*3/UL (ref 0.1–1)
MONOCYTES NFR BLD AUTO: 11.1 %
NEUTROPHILS # BLD AUTO: 1.74 X10*3/UL (ref 1.2–7.7)
NEUTROPHILS NFR BLD AUTO: 37.6 %
NRBC BLD-RTO: 0 /100 WBCS (ref 0–0)
OXYHGB MFR BLDV: 62.8 % (ref 45–75)
PCO2 BLDV: 45 MM HG (ref 41–51)
PH BLDV: 7.36 PH (ref 7.33–7.43)
PLATELET # BLD AUTO: 467 X10*3/UL (ref 150–450)
PO2 BLDV: 44 MM HG (ref 35–45)
POTASSIUM BLDV-SCNC: 4.1 MMOL/L (ref 3.5–5.3)
POTASSIUM SERPL-SCNC: 5.9 MMOL/L (ref 3.5–5.3)
PROT SERPL-MCNC: 8.4 G/DL (ref 6.4–8.2)
RBC # BLD AUTO: 4.09 X10*6/UL (ref 4–5.2)
SALICYLATES SERPL-MCNC: <3 MG/DL (ref ?–20)
SAO2 % BLDV: 64 % (ref 45–75)
SODIUM BLDV-SCNC: 143 MMOL/L (ref 136–145)
SODIUM SERPL-SCNC: 140 MMOL/L (ref 136–145)
WBC # BLD AUTO: 4.6 X10*3/UL (ref 4.4–11.3)

## 2025-07-29 PROCEDURE — 80143 DRUG ASSAY ACETAMINOPHEN: CPT | Performed by: EMERGENCY MEDICINE

## 2025-07-29 PROCEDURE — 99284 EMERGENCY DEPT VISIT MOD MDM: CPT | Mod: 25 | Performed by: EMERGENCY MEDICINE

## 2025-07-29 PROCEDURE — 85025 COMPLETE CBC W/AUTO DIFF WBC: CPT | Performed by: EMERGENCY MEDICINE

## 2025-07-29 PROCEDURE — 82330 ASSAY OF CALCIUM: CPT | Performed by: EMERGENCY MEDICINE

## 2025-07-29 PROCEDURE — 70450 CT HEAD/BRAIN W/O DYE: CPT | Performed by: RADIOLOGY

## 2025-07-29 PROCEDURE — 96361 HYDRATE IV INFUSION ADD-ON: CPT

## 2025-07-29 PROCEDURE — 84132 ASSAY OF SERUM POTASSIUM: CPT | Performed by: EMERGENCY MEDICINE

## 2025-07-29 PROCEDURE — 2500000004 HC RX 250 GENERAL PHARMACY W/ HCPCS (ALT 636 FOR OP/ED): Performed by: STUDENT IN AN ORGANIZED HEALTH CARE EDUCATION/TRAINING PROGRAM

## 2025-07-29 PROCEDURE — 36415 COLL VENOUS BLD VENIPUNCTURE: CPT | Performed by: EMERGENCY MEDICINE

## 2025-07-29 PROCEDURE — 96360 HYDRATION IV INFUSION INIT: CPT

## 2025-07-29 PROCEDURE — 70450 CT HEAD/BRAIN W/O DYE: CPT

## 2025-07-29 RX ADMIN — SODIUM CHLORIDE 1000 ML: 0.9 INJECTION, SOLUTION INTRAVENOUS at 18:32

## 2025-07-29 NOTE — ED TRIAGE NOTES
Pt presents ot the ED with complaints of AMS that started 2 days ago. Per the pt's mother the pt has a HX of anxiety and seizures but is not on meds because the doctors state the her seizures are caused by anxiety. Pt was very sleeping this morning and then became very disoriented and was unable to know what was going on and does not remember the day or what happened today.

## 2025-07-30 ENCOUNTER — HOSPITAL ENCOUNTER (EMERGENCY)
Facility: HOSPITAL | Age: 40
Discharge: HOME | End: 2025-07-30
Attending: EMERGENCY MEDICINE
Payer: COMMERCIAL

## 2025-07-30 VITALS
RESPIRATION RATE: 16 BRPM | OXYGEN SATURATION: 96 % | SYSTOLIC BLOOD PRESSURE: 135 MMHG | BODY MASS INDEX: 28.52 KG/M2 | DIASTOLIC BLOOD PRESSURE: 93 MMHG | HEIGHT: 62 IN | TEMPERATURE: 97.3 F | WEIGHT: 155 LBS | HEART RATE: 104 BPM

## 2025-07-30 DIAGNOSIS — F10.920 ALCOHOLIC INTOXICATION WITHOUT COMPLICATION: Primary | ICD-10-CM

## 2025-07-30 NOTE — ED PROVIDER NOTES
Emergency Department Provider Note        History of Present Illness     History provided by: Patient  Limitations to History: None  External Records Reviewed with Brief Summary: Reviewed in chart    HPI:  Carie Mckinnon is a 39 y.o. female past medical history of PNES, anxiety presenting with altered mental status that started 2 days ago.  Per the patient's mom the patient has a history of anxiety and seizures but is not on meds because the doctor states that her seizures are caused by anxiety.  Mother reports that patient was with her sister and was having her typical seizure episodes where she purses her lips and sounds like she is snoring.  They report that she was sleepy this morning and became very disoriented and was unable to recall the events of the day prior.  Patient reports that she did drink heavily.  She denies any fever, chills, chest pain, shortness of breath, tongue biting, incontinence.    Physical Exam   Triage vitals:  T 36.3 °C (97.3 °F)  HR (!) 112  /84  RR 18  O2 98 % None (Room air)    General: Awake, alert, in no acute distress  Eyes: Gaze conjugate.  No scleral icterus or injection  HENT: Normo-cephalic, atraumatic. No stridor  CV: Regular rate, regular rhythm. Radial pulses 2+ bilaterally  Resp: Breathing non-labored, speaking in full sentences.  Clear to auscultation bilaterally  GI: Soft, non-distended, non-tender. No rebound or guarding.  : Deferred  MSK/Extremities: No gross bony deformities. Moving all extremities.    Skin: Warm. Appropriate color  Neuro: Alert. Oriented. Face symmetric. Speech is fluent.  Gross strength and sensation intact in b/l UE and LEs  Psych: Appropriate mood and affect    Medical Decision Making & ED Course   Medical Decision Makin y.o. female presentation at arrival most suggestive of acute alcohol intoxication. Differential includes but not limited to infection, electrolyte abnormality, seizure.  Low suspicion for infection given  afebrile, without hypotension, and tachycardia improved with 1 L bolus.  Suspicion for seizure given no tongue biting, no LOC, no bowel or bladder incontinence.  Labs ordered: Labs, VBG, acute tox panel. Imaging ordered: CT head. Treatments including 1 L bolus were administered. Labs reviewed and notable for elevated alcohol and lactate consistent with acute ETOH intoxication. Imaging reviewed and notable for no acute findings. On re-examination of patient, patient clinically sober, ambulating without difficulty and tolerated p.o. Patient and/or patient's representative was counseled regarding labs, imaging, likely diagnosis, and plan. All questions were answered.  She was discharged in stable condition.  ----  Social Determinants of Health which Significantly Impact Care: None identified     EKG Independent Interpretation: EKG not obtained    Independent Result Review and Interpretation: Relevant laboratory and radiographic results were reviewed and independently interpreted by myself.  As necessary, they are commented on in the ED Course.    Chronic conditions affecting the patient's care: As documented above in [facility]    The patient was discussed with the following consultants/services: None    Care Considerations: As documented above in [facility]    ED Course:  Diagnoses as of 07/30/25 0034   Alcoholic intoxication without complication     Disposition   As a result of the work-up, the patient was discharged home.  she was informed of her diagnosis and instructed to come back with any concerns or worsening of condition.  she and was agreeable to the plan as discussed above.  she was given the opportunity to ask questions.  All of the patient's questions were answered.    Procedures   Procedures    Patient seen and discussed with ED attending physician.    Tereza King MD  Emergency Medicine     Tereza King MD  Resident  08/02/25 [id number]

## 2025-08-11 ENCOUNTER — APPOINTMENT (OUTPATIENT)
Dept: BEHAVIORAL HEALTH | Facility: CLINIC | Age: 40
End: 2025-08-11
Payer: COMMERCIAL

## 2025-08-18 DIAGNOSIS — M21.172 VARUS DEFORMITY, NOT ELSEWHERE CLASSIFIED, LEFT ANKLE: ICD-10-CM

## 2025-08-20 ENCOUNTER — PATIENT MESSAGE (OUTPATIENT)
Dept: PRIMARY CARE | Facility: CLINIC | Age: 40
End: 2025-08-20
Payer: COMMERCIAL

## 2025-08-20 DIAGNOSIS — J45.40 MODERATE PERSISTENT ASTHMA WITHOUT COMPLICATION (HHS-HCC): ICD-10-CM

## 2025-08-20 DIAGNOSIS — M21.172 VARUS DEFORMITY, NOT ELSEWHERE CLASSIFIED, LEFT ANKLE: ICD-10-CM

## 2025-08-20 RX ORDER — GABAPENTIN 300 MG/1
300 CAPSULE ORAL 2 TIMES DAILY
Qty: 20 CAPSULE | Refills: 0 | Status: CANCELLED | OUTPATIENT
Start: 2025-08-20

## 2025-09-04 ASSESSMENT — DERMATOLOGY QUALITY OF LIFE (QOL) ASSESSMENT
WHAT SINGLE SKIN CONDITION LISTED BELOW IS THE PATIENT ANSWERING THE QUALITY-OF-LIFE ASSESSMENT QUESTIONS ABOUT: DERMATITIS
RATE HOW BOTHERED YOU ARE BY EFFECTS OF YOUR SKIN PROBLEMS ON YOUR ACTIVITIES (EG, GOING OUT, ACCOMPLISHING WHAT YOU WANT, WORK ACTIVITIES OR YOUR RELATIONSHIPS WITH OTHERS): 2
RATE HOW EMOTIONALLY BOTHERED YOU ARE BY YOUR SKIN PROBLEM (FOR EXAMPLE, WORRY, EMBARRASSMENT, FRUSTRATION): 3
WHAT SINGLE SKIN CONDITION LISTED BELOW IS THE PATIENT ANSWERING THE QUALITY-OF-LIFE ASSESSMENT QUESTIONS ABOUT: DERMATITIS
RATE HOW EMOTIONALLY BOTHERED YOU ARE BY YOUR SKIN PROBLEM (FOR EXAMPLE, WORRY, EMBARRASSMENT, FRUSTRATION): 3
RATE HOW BOTHERED YOU ARE BY SYMPTOMS OF YOUR SKIN PROBLEM (EG, ITCHING, STINGING BURNING, HURTING OR SKIN IRRITATION): 3
RATE HOW BOTHERED YOU ARE BY SYMPTOMS OF YOUR SKIN PROBLEM (EG, ITCHING, STINGING BURNING, HURTING OR SKIN IRRITATION): 3
RATE HOW BOTHERED YOU ARE BY EFFECTS OF YOUR SKIN PROBLEMS ON YOUR ACTIVITIES (EG, GOING OUT, ACCOMPLISHING WHAT YOU WANT, WORK ACTIVITIES OR YOUR RELATIONSHIPS WITH OTHERS): 2

## 2025-09-04 ASSESSMENT — PATIENT GLOBAL ASSESSMENT (PGA): WHAT IS THE PGA: PATIENT GLOBAL ASSESSMENT:  1 - CLEAR

## 2025-09-05 ENCOUNTER — APPOINTMENT (OUTPATIENT)
Dept: DERMATOLOGY | Facility: CLINIC | Age: 40
End: 2025-09-05
Payer: COMMERCIAL

## 2025-09-05 DIAGNOSIS — M21.172 VARUS DEFORMITY, NOT ELSEWHERE CLASSIFIED, LEFT ANKLE: ICD-10-CM

## 2025-09-05 RX ORDER — GABAPENTIN 300 MG/1
300 CAPSULE ORAL 2 TIMES DAILY
Qty: 60 CAPSULE | Refills: 0 | OUTPATIENT
Start: 2025-09-05

## 2025-09-05 RX ORDER — GABAPENTIN 300 MG/1
300 CAPSULE ORAL 2 TIMES DAILY
Qty: 20 CAPSULE | Refills: 0 | Status: CANCELLED | OUTPATIENT
Start: 2025-09-05

## 2025-09-06 RX ORDER — ALBUTEROL SULFATE 90 UG/1
1 INHALANT RESPIRATORY (INHALATION) EVERY 6 HOURS PRN
Qty: 18 G | Refills: 1 | Status: SHIPPED | OUTPATIENT
Start: 2025-09-06

## 2025-09-23 ENCOUNTER — APPOINTMENT (OUTPATIENT)
Dept: BEHAVIORAL HEALTH | Facility: CLINIC | Age: 40
End: 2025-09-23
Payer: COMMERCIAL

## 2025-09-29 ENCOUNTER — APPOINTMENT (OUTPATIENT)
Dept: DERMATOLOGY | Facility: CLINIC | Age: 40
End: 2025-09-29
Payer: COMMERCIAL

## 2026-01-15 ENCOUNTER — APPOINTMENT (OUTPATIENT)
Dept: PRIMARY CARE | Facility: CLINIC | Age: 41
End: 2026-01-15